# Patient Record
Sex: FEMALE | Race: BLACK OR AFRICAN AMERICAN | Employment: OTHER | URBAN - METROPOLITAN AREA
[De-identification: names, ages, dates, MRNs, and addresses within clinical notes are randomized per-mention and may not be internally consistent; named-entity substitution may affect disease eponyms.]

---

## 2017-01-27 RX ORDER — INSULIN GLARGINE 100 [IU]/ML
INJECTION, SOLUTION SUBCUTANEOUS
Qty: 15 ML | Refills: 4 | Status: SHIPPED | OUTPATIENT
Start: 2017-01-27 | End: 2017-05-09 | Stop reason: SDUPTHER

## 2017-04-12 ENCOUNTER — OP HISTORICAL/CONVERTED ENCOUNTER (OUTPATIENT)
Dept: OTHER | Age: 62
End: 2017-04-12

## 2017-04-18 ENCOUNTER — OP HISTORICAL/CONVERTED ENCOUNTER (OUTPATIENT)
Dept: OTHER | Age: 62
End: 2017-04-18

## 2017-05-09 ENCOUNTER — OFFICE VISIT (OUTPATIENT)
Dept: ENDOCRINOLOGY | Age: 62
End: 2017-05-09

## 2017-05-09 VITALS
RESPIRATION RATE: 20 BRPM | HEIGHT: 66 IN | WEIGHT: 155 LBS | OXYGEN SATURATION: 99 % | TEMPERATURE: 97.5 F | SYSTOLIC BLOOD PRESSURE: 143 MMHG | HEART RATE: 77 BPM | DIASTOLIC BLOOD PRESSURE: 73 MMHG | BODY MASS INDEX: 24.91 KG/M2

## 2017-05-09 DIAGNOSIS — E11.3593 PROLIFERATIVE DIABETIC RETINOPATHY OF BOTH EYES ASSOCIATED WITH TYPE 2 DIABETES MELLITUS, UNSPECIFIED PROLIFERATIVE RETINOPATHY TYPE (HCC): ICD-10-CM

## 2017-05-09 DIAGNOSIS — E16.2 HYPOGLYCEMIA: ICD-10-CM

## 2017-05-09 LAB
GLUCOSE POC: 146 MG/DL
HBA1C MFR BLD HPLC: 5.3 %

## 2017-05-09 RX ORDER — ACETAMINOPHEN 500 MG
TABLET ORAL
COMMUNITY

## 2017-05-09 RX ORDER — LANCETS 28 GAUGE
EACH MISCELLANEOUS
COMMUNITY
Start: 2017-02-04 | End: 2022-07-28

## 2017-05-09 RX ORDER — CLONIDINE 0.2 MG/24H
PATCH, EXTENDED RELEASE TRANSDERMAL
Refills: 1 | COMMUNITY
Start: 2017-04-17 | End: 2019-06-04 | Stop reason: ALTCHOICE

## 2017-05-09 RX ORDER — SODIUM BICARBONATE 650 MG/1
1 TABLET ORAL 3 TIMES DAILY
Refills: 2 | COMMUNITY
Start: 2017-02-21 | End: 2019-06-04 | Stop reason: ALTCHOICE

## 2017-05-09 NOTE — MR AVS SNAPSHOT
Visit Information Date & Time Provider Department Dept. Phone Encounter #  
 5/9/2017  1:45 PM Sean Her MD [facility] [phone] [id number] Follow-up Instructions Return in about 4 months (around 9/9/2017). Upcoming Health Maintenance Date Due Hepatitis C Screening 1955 FOOT EXAM Q1 2/23/1964 Pneumococcal 19-64 Medium Risk (1 of 1 - PPSV23) 2/23/1973 DTaP/Tdap/Td series (1 - Tdap) 2/23/1975 PAP AKA CERVICAL CYTOLOGY 2/23/1975 BREAST CANCER SCRN MAMMOGRAM 2/23/2004 FOBT Q 1 YEAR AGE 50-75 2/23/2004 ZOSTER VACCINE AGE 60> 2/23/2014 EYE EXAM RETINAL OR DILATED Q1 10/20/2015 HEMOGLOBIN A1C Q6M 3/9/2017 INFLUENZA AGE 9 TO ADULT 8/1/2017 MICROALBUMIN Q1 9/9/2017 LIPID PANEL Q1 9/9/2017 Allergies as of 5/9/2017  Review Complete On: 5/9/2017 By: Sean Her MD  
 No Known Allergies Current Immunizations  Never Reviewed No immunizations on file. Not reviewed this visit You Were Diagnosed With   
  
 Codes Comments Uncontrolled type 2 diabetes mellitus with diabetic nephropathy, with long-term current use of insulin ([facility]    -  Primary ICD-10-CM: E11.21, E11.65, Z79.4 ICD-9-CM: 250.42, 583.81, V58.67 Vitals BP Pulse Temp Resp Height(growth percentile) Weight(growth percentile) 143/73 77 97.5 °F (36.4 °C) (Oral) 20 5' 6\" (1.676 m) 155 lb (70.3 kg) SpO2 BMI OB Status Smoking Status 99% 25.02 kg/m2 Postmenopausal Never Smoker BMI and BSA Data Body Mass Index Body Surface Area 25.02 kg/m 2 1.81 m 2 Preferred Pharmacy Pharmacy Name Phone [address] [facility] [phone] Your Updated Medication List  
  
   
This list is accurate as of: 5/9/17  2:18 PM.  Always use your most recent med list. amLODIPine 10 mg tablet Commonly known as:  Nisreen Zarate Take 10 mg by mouth daily. aspirin 81 mg chewable tablet Take 81 mg by mouth daily. CENTRUM COMPLETE PO Take  by mouth.  
  
 cloNIDine 0.2 mg/24 hr patch Commonly known as:  CATAPRES  
APPLY 1 PATCH TRANSDERMALLY ONE TIME PER WEEK COREG 25 mg tablet Generic drug:  carvedilol Take 25 mg by mouth two (2) times daily (with meals). CRESTOR 40 mg tablet Generic drug:  rosuvastatin Take 40 mg by mouth nightly. docusate sodium 100 mg capsule Commonly known as:  Major Person Take 100 mg by mouth as needed for Constipation. EPOGEN INJECTION  
by Injection route every seven (7) days. famotidine 20 mg tablet Commonly known as:  PEPCID Take 20 mg by mouth as needed. ferrous sulfate 325 mg (65 mg iron) tablet Take  by mouth as needed. furosemide 40 mg tablet Commonly known as:  LASIX  
  
 glucose blood VI test strips strip Commonly known as:  FREESTYLE LITE STRIPS Test 4 times daily. Dx code E11.65  
  
 hydrALAZINE 100 mg tablet Commonly known as:  APRESOLINE Take  by mouth. insulin glargine 100 unit/mL (3 mL) pen Commonly known as:  LANTUS SOLOSTAR Inject 15 units at bedtime. STOP HUMALOG Insulin Needles (Disposable) 32 gauge x 5/32\" Ndle Commonly known as:  Rupali Pen Needle Use 4 times daily. Dx code 250.00 Insulin Needles (Disposable) 32 gauge x 5/32\" Ndle Commonly known as:  Rupali Pen Needle Use once daily. Dx code E11.65  
  
 insulin syringe-needle U-100 1/2 mL 31 gauge x 15/64\" Syrg Commonly known as:  BD INSULIN SYRINGE ULTRA-FINE  
1 Each by Does Not Apply route daily. Dx Code 250.00  
  
 isosorbide mononitrate ER 60 mg CR tablet Commonly known as:  IMDUR Take  by mouth every morning. * Lancets Misc Commonly known as:  FREESTYLE LANCETS Test 4 times daily. Dx code E11.65 * FREESTYLE LANCETS 28 gauge Misc Generic drug:  lancets PLAVIX 75 mg Tab Generic drug:  clopidogrel Take  by mouth daily. repaglinide 0.5 mg tablet Commonly known as:  Nik Aguayoccoli Take 1 Tab by mouth Before breakfast, lunch, and dinner. sodium bicarbonate 650 mg tablet Take 1 Tab by mouth three (3) times daily. TYLENOL EXTRA STRENGTH 500 mg tablet Generic drug:  acetaminophen Take  by mouth every six (6) hours as needed for Pain. * Notice: This list has 2 medication(s) that are the same as other medications prescribed for you. Read the directions carefully, and ask your doctor or other care provider to review them with you. We Performed the Following AMB POC GLUCOSE, QUANTITATIVE, BLOOD [47878 CPT(R)] AMB POC HEMOGLOBIN A1C [47636 CPT(R)] Follow-up Instructions Return in about 4 months (around 9/9/2017). Patient Instructions Fasting anywhere between 80 to 130 mg Rest of the day  In between 80 to 180 mg Check blood sugars immediately before each meal and at bedtime Decrease  Lantus  insulin to 10   units  at bed time (pens ) Take prandin 0.5 mg  before breakfast,  before lunch and  before dinner. Less than 70 mg NO INSULIN 
 
 
 
Do not skip meals Do not eat in between meals Reduce carbs- pasta, rice, potatoes, bread Do not drink juices or sodas Donot eat peanut butter , biscuits Do not eat sugar free cookies and cakes Do not eat peaches, grapes , oranges and pineapples 
 
 
--------------------------------------------------------------------------------------------------- Introducing [facility]! Kathie Hermosillo introduces [employer] patient portal. Now you can access parts of your medical record, email your doctor's office, and request medication refills online. 1. In your internet browser, go to [email or web address] 2. Click on the First Time User? Click Here link in the Sign In box. You will see the New Member Sign Up page. 3. Enter your [employer] Access Code exactly as it appears below.  You will not need to use this code after youve completed the sign-up process. If you do not sign up before the expiration date, you must request a new code. · [employer] Access Code: [email or web address] Expires: 8/7/2017  2:18 PM 
 
4. Enter the last four digits of your Social Security Number (xxxx) and Date of Birth (mm/dd/yyyy) as indicated and click Submit. You will be taken to the next sign-up page. 5. Create a [employer] ID. This will be your [employer] login ID and cannot be changed, so think of one that is secure and easy to remember. 6. Create a [employer] password. You can change your password at any time. 7. Enter your Password Reset Question and Answer. This can be used at a later time if you forget your password. 8. Enter your e-mail address. You will receive e-mail notification when new information is available in [address]. 9. Click Sign Up. You can now view and download portions of your medical record. 10. Click the Download Summary menu link to download a portable copy of your medical information. If you have questions, please visit the Frequently Asked Questions section of the [employer] website. Remember, [employer] is NOT to be used for urgent needs. For medical emergencies, dial 911. Now available from your iPhone and Android! Please provide this summary of care documentation to your next provider. Your primary care clinician is listed as Falguni Zimmer. If you have any questions after today's visit, please call [phone].

## 2017-05-09 NOTE — PATIENT INSTRUCTIONS
Fasting anywhere between 80 to 130 mg   Rest of the day  In between 80 to 180 mg               Check blood sugars immediately before each meal and at bedtime      Decrease  Lantus  insulin to 10   units  at bed time (pens )      Take prandin 0.5 mg  before breakfast,  before lunch and  before dinner.        Less than 70 mg NO INSULIN        Do not skip meals  Do not eat in between meals    Reduce carbs- pasta, rice, potatoes, bread   Do not drink juices or sodas  Donot eat peanut butter , biscuits     Do not eat sugar free cookies and cakes   Do not eat peaches, grapes , oranges and pineapples      ---------------------------------------------------------------------------------------------------

## 2017-05-09 NOTE — PROGRESS NOTES
Wt Readings from Last 3 Encounters:   05/09/17 155 lb (70.3 kg)   09/16/16 156 lb (70.8 kg)   08/05/16 160 lb (72.6 kg)     Temp Readings from Last 3 Encounters:   05/09/17 97.5 °F (36.4 °C) (Oral)   09/16/16 97 °F (36.1 °C) (Oral)   08/05/16 96.5 °F (35.8 °C) (Oral)     BP Readings from Last 3 Encounters:   05/09/17 143/73   09/16/16 159/77   08/05/16 182/85     Pulse Readings from Last 3 Encounters:   05/09/17 77   09/16/16 73   08/05/16 71     Lab Results   Component Value Date/Time    Hemoglobin A1c 5.7 09/09/2016 10:14 AM    Hemoglobin A1c (POC) 5.7 08/20/2015 12:16 PM    Hemoglobin A1c, External 6.6 05/06/2016     No Podiatry  Last Eye exam 2017

## 2017-05-09 NOTE — PROGRESS NOTES
HISTORY OF PRESENT ILLNESS  Sid Grant is a 60 y.o. female. HPI  Patient here for  F/u after last visit of Type 2 diabetes mellitus  From Sept 2016      Here to get A1c done for dental work  Accompanied by her    No h/o hospitalizations gladly      She is getting a good help from her    She says today unlike previous visits   She has fasting low sugars     She has only ONE check a day     She claims compliance with  meds   Diabetes, retinopathy and nephropathy    Social History     Social History    Marital status:      Spouse name: N/A    Number of children: N/A    Years of education: N/A     Occupational History    Not on file.      Social History Main Topics    Smoking status: Never Smoker    Smokeless tobacco: Not on file    Alcohol use No    Drug use: Not on file    Sexual activity: Not on file     Other Topics Concern    Not on file     Social History Narrative       Family History   Problem Relation Age of Onset    Diabetes Maternal Aunt     Diabetes Maternal Grandmother              Old history   Referred : Temo cummings     H/o diabetes for 10  years     Current A1C is 9 %  and symptoms/problems include fatigue     Current diabetic medications include Lantus 45 at night , tradjenta 5 mg ( non compliant )    Current monitoring regimen: home blood tests - daily  Home blood sugar records: trend: fluctuating a lot  Any episodes of hypoglycemia? no    Weight trend: fluctuating a bit  Prior visit with dietician: no  Current diet: \"unhealthy\" diet in general  Current exercise: no regular exercise    Known diabetic complications: retinopathy, nephropathy, peripheral neuropathy and cardiovascular disease  Cardiovascular risk factors: dyslipidemia, diabetes mellitus, hypertension, stress    Eye exam current (within one year): yes  TANIYA: yes     Past Medical History:   Diagnosis Date    CKD (chronic kidney disease)     HTN (hypertension)     Retinopathy     Type II or unspecified type diabetes mellitus with renal manifestations, uncontrolled      No past surgical history on file. Current Outpatient Prescriptions   Medication Sig    sodium bicarbonate 650 mg tablet Take 1 Tab by mouth three (3) times daily.  cloNIDine (CATAPRES) 0.2 mg/24 hr patch APPLY 1 PATCH TRANSDERMALLY ONE TIME PER WEEK    FREESTYLE LANCETS 28 gauge misc     MULTIVITAMIN/IRON/FOLIC ACID (CENTRUM COMPLETE PO) Take  by mouth.  acetaminophen (TYLENOL EXTRA STRENGTH) 500 mg tablet Take  by mouth every six (6) hours as needed for Pain.  insulin glargine (LANTUS SOLOSTAR) 100 unit/mL (3 mL) pen Inject 15 units at bedtime. STOP HUMALOG    furosemide (LASIX) 40 mg tablet     hydrALAZINE (APRESOLINE) 100 mg tablet Take  by mouth.  glucose blood VI test strips (FREESTYLE LITE STRIPS) strip Test 4 times daily. Dx code E11.65    Lancets (FREESTYLE LANCETS) misc Test 4 times daily. Dx code E11.65    EPOETIN PARAG (EPOGEN INJECTION) by Injection route every seven (7) days.  Insulin Needles, Disposable, (DARLING PEN NEEDLE) 32 gauge x 5/32\" ndle Use once daily. Dx code E11.65    repaglinide (PRANDIN) 0.5 mg tablet Take 1 Tab by mouth Before breakfast, lunch, and dinner.  amLODIPine (NORVASC) 10 mg tablet Take 10 mg by mouth daily.  isosorbide mononitrate ER (IMDUR) 60 mg CR tablet Take  by mouth every morning.  insulin syringe-needle U-100 (BD INSULIN SYRINGE ULTRA-FINE) 1/2 mL 31 x 15/64\" syrg 1 Each by Does Not Apply route daily. Dx Code 250.00    rosuvastatin (CRESTOR) 40 mg tablet Take 40 mg by mouth nightly.  Insulin Needles, Disposable, (DARLING PEN NEEDLE) 32 x 5/32 \" ndle Use 4 times daily. Dx code 250.00    docusate sodium (COLACE) 100 mg capsule Take 100 mg by mouth as needed for Constipation.  carvedilol (COREG) 25 mg tablet Take 25 mg by mouth two (2) times daily (with meals).  clopidogrel (PLAVIX) 75 mg tablet Take  by mouth daily.     ferrous sulfate 325 mg (65 mg iron) tablet Take  by mouth as needed.  famotidine (PEPCID) 20 mg tablet Take 20 mg by mouth as needed.  aspirin 81 mg chewable tablet Take 81 mg by mouth daily. No current facility-administered medications for this visit. Review of Systems   Constitutional: Positive for malaise/fatigue   HENT: Negative. Eyes:  legally blind . Respiratory: Positive for cough. Cardiovascular: Negative for chest pain, palpitations and leg swelling. Gastrointestinal: Negative. Negative for constipation. Genitourinary: Negative. Musculoskeletal: Negative for myalgias. Skin: Negative. Neurological: negative  Endo/Heme/Allergies: Negative. Psychiatric/Behavioral: Negative for depression and memory loss. The patient does not have insomnia. Physical Exam   Constitutional: She is oriented to person, place, and time. She appears well-developed and well-nourished. HENT:   Head: Normocephalic. Eyes: Legally blind   Neck: Normal range of motion. Neck supple. No JVD present. No tracheal deviation present. No thyromegaly present. Cardiovascular: Normal rate, regular rhythm and normal heart sounds. No murmur heard. Pulmonary/Chest: Breath sounds normal.   Abdominal: Soft. Bowel sounds are normal.   Musculoskeletal: Normal range of motion. Lymphadenopathy:     She has no cervical adenopathy. Neurological: She is alert and oriented to person, place, and time. She has normal reflexes. Skin: Skin is warm. Psychiatric: She has a normal mood and affect. Reviewed the labs from pcp, and  Dr. Benjamin and PLAN    1.  Type 2 DM uncontrolled : A1c is   5.3 %       From   Today      May 2017    comapred to    8.3 %  From today aug 2016 comapred to   6.2 %      From   Sept 2015 compared to    5.6 %   From Aug 2015 compared to   7 %   From May 2015 compared to over 9 % compared to over 12% from pcp labs     She has several diabetic complications retinopathy and nephropathy    paul is  Supervising  Her , but he works part time   Pt says she herself  administers lantus some times    As she has low sugars, decreased insulin to 10 units     There is no longer a nurse who comes home  She is blind almost and takes insulin , Using pen finally   ? ? prandin 0.5 mg 3 times a day before each meal , asked her to seprate the bottle by rubber band to recognize it     Patient is advised to check blood sugars 2-3  times daily by rotation method. reviewed medications and side effects in detail  lab results and schedule of future lab studies reviewed with patient      2. Hypoglycemia : At high risk because of blindness, being on insulin, dependant on others, and inability to check more often    Educated on treating the hypoglycemia and again admonished to take care of herself       3. HTN : better controlled with improved compliance since the long discussion with her    continue coreg 25 mg bid, lisinopril 20 mg a day,  norvasc  5 mg 2 pills a day,   and imdur 60 mg  A day  . 4. Dyslipidemia : continue crestor 40 . Patient is educated about benefits and adverse effects of statins and explained how benefits outweigh risk. 5. use of aspirin to prevent MI and TIA's discussed        6. Diabetic complications :     A. Retinopathy-YES has laser on left eye Legally blind   She draws insulin herself out of vial   regular f/u with ophthalmologist encouraged     B. Nephropathy - YES   ,stage 4 ( labs from sept first week )  Proteinuria   educated on this disease and indicated stages and its prognosis. Regular care with nephrologist encouraged  Forced her to see the  Nephrologist, whom she saw Dr. Josy Bauman. Peripheral Neuropathy - YES  , mild  educated on this disease and indicated improvement with good and stable glycemic control    D.  PAD : YES     order TANIYA /PVR    E : CAD : YES, f/u with Dr. Winda Galeazzi  Had CABG, and has PCM    She is educated about fluid restriction, not to worry about fine print on meds which say , causes weight gain     > 50 % of time is spent on counseling done         F/u in 2 months

## 2017-05-10 RX ORDER — REPAGLINIDE 0.5 MG/1
0.5 TABLET ORAL
Qty: 270 TAB | Refills: 4 | Status: SHIPPED | OUTPATIENT
Start: 2017-05-10 | End: 2018-03-13 | Stop reason: DRUGHIGH

## 2017-05-10 RX ORDER — INSULIN GLARGINE 100 [IU]/ML
INJECTION, SOLUTION SUBCUTANEOUS
Qty: 15 ML | Refills: 4 | Status: SHIPPED | OUTPATIENT
Start: 2017-05-10 | End: 2017-09-12

## 2017-06-02 ENCOUNTER — IP HISTORICAL/CONVERTED ENCOUNTER (OUTPATIENT)
Dept: OTHER | Age: 62
End: 2017-06-02

## 2017-06-15 ENCOUNTER — ED HISTORICAL/CONVERTED ENCOUNTER (OUTPATIENT)
Dept: OTHER | Age: 62
End: 2017-06-15

## 2017-09-05 ENCOUNTER — TELEPHONE (OUTPATIENT)
Dept: ENDOCRINOLOGY | Age: 62
End: 2017-09-05

## 2017-09-05 DIAGNOSIS — Z79.4 TYPE 2 DIABETES MELLITUS WITH HYPERGLYCEMIA, WITH LONG-TERM CURRENT USE OF INSULIN (HCC): Primary | ICD-10-CM

## 2017-09-05 DIAGNOSIS — E11.65 TYPE 2 DIABETES MELLITUS WITH HYPERGLYCEMIA, WITH LONG-TERM CURRENT USE OF INSULIN (HCC): Primary | ICD-10-CM

## 2017-09-06 ENCOUNTER — HOSPITAL ENCOUNTER (OUTPATIENT)
Dept: LAB | Age: 62
Discharge: HOME | End: 2017-09-06
Payer: MEDICARE

## 2017-09-06 ENCOUNTER — TELEPHONE (OUTPATIENT)
Dept: ENDOCRINOLOGY | Age: 62
End: 2017-09-06

## 2017-09-06 DIAGNOSIS — E11.65 TYPE 2 DIABETES MELLITUS WITH HYPERGLYCEMIA, WITH LONG-TERM CURRENT USE OF INSULIN (HCC): Primary | ICD-10-CM

## 2017-09-06 DIAGNOSIS — Z79.4 TYPE 2 DIABETES MELLITUS WITH HYPERGLYCEMIA, WITH LONG-TERM CURRENT USE OF INSULIN (HCC): Primary | ICD-10-CM

## 2017-09-06 PROCEDURE — 82043 UR ALBUMIN QUANTITATIVE: CPT

## 2017-09-08 LAB
ALBUMIN/CREAT UR: 283.2 MG/G CREAT (ref 0–30)
CREAT UR-MCNC: 161.1 MG/DL
Lab: NORMAL
MICROALBUMIN UR-MCNC: 456.2 UG/ML

## 2017-09-12 ENCOUNTER — OFFICE VISIT (OUTPATIENT)
Dept: ENDOCRINOLOGY | Age: 62
End: 2017-09-12

## 2017-09-12 VITALS
TEMPERATURE: 97.6 F | WEIGHT: 159 LBS | HEART RATE: 71 BPM | DIASTOLIC BLOOD PRESSURE: 64 MMHG | HEIGHT: 66 IN | SYSTOLIC BLOOD PRESSURE: 140 MMHG | BODY MASS INDEX: 25.55 KG/M2 | RESPIRATION RATE: 12 BRPM

## 2017-09-12 DIAGNOSIS — Z99.2 TYPE 2 DIABETES MELLITUS WITH CHRONIC KIDNEY DISEASE ON CHRONIC DIALYSIS, WITH LONG-TERM CURRENT USE OF INSULIN (HCC): Primary | ICD-10-CM

## 2017-09-12 DIAGNOSIS — Z79.4 TYPE 2 DIABETES MELLITUS WITH CHRONIC KIDNEY DISEASE ON CHRONIC DIALYSIS, WITH LONG-TERM CURRENT USE OF INSULIN (HCC): Primary | ICD-10-CM

## 2017-09-12 DIAGNOSIS — I10 ESSENTIAL HYPERTENSION: ICD-10-CM

## 2017-09-12 DIAGNOSIS — N18.6 TYPE 2 DIABETES MELLITUS WITH CHRONIC KIDNEY DISEASE ON CHRONIC DIALYSIS, WITH LONG-TERM CURRENT USE OF INSULIN (HCC): Primary | ICD-10-CM

## 2017-09-12 DIAGNOSIS — E16.2 HYPOGLYCEMIA: ICD-10-CM

## 2017-09-12 DIAGNOSIS — E11.22 TYPE 2 DIABETES MELLITUS WITH CHRONIC KIDNEY DISEASE ON CHRONIC DIALYSIS, WITH LONG-TERM CURRENT USE OF INSULIN (HCC): Primary | ICD-10-CM

## 2017-09-12 RX ORDER — PANTOPRAZOLE SODIUM 40 MG/1
TABLET, DELAYED RELEASE ORAL
COMMUNITY
Start: 2017-07-20

## 2017-09-12 NOTE — PROGRESS NOTES
HISTORY OF PRESENT ILLNESS  Massimo Gamboa is a 60 y.o. female. HPI  Patient here for  F/u after last visit of Type 2 diabetes mellitus  From May 9   2017      In the interim, she got started on HD   She is getting a good help from her        She has fasting low sugars     She has only ONE check a day     She claims compliance with  meds       Social History     Social History    Marital status:      Spouse name: N/A    Number of children: N/A    Years of education: N/A     Occupational History    Not on file. Social History Main Topics    Smoking status: Never Smoker    Smokeless tobacco: Never Used    Alcohol use No    Drug use: Not on file    Sexual activity: Not on file     Other Topics Concern    Not on file     Social History Narrative       Family History   Problem Relation Age of Onset    Diabetes Maternal Aunt     Diabetes Maternal Grandmother              Old history   Referred : James cummings     H/o diabetes for 10  years     Current A1C is 9 %  and symptoms/problems include fatigue     Current diabetic medications include Lantus 45 at night , tradjenta 5 mg ( non compliant )    Current monitoring regimen: home blood tests - daily  Home blood sugar records: trend: fluctuating a lot  Any episodes of hypoglycemia? no    Weight trend: fluctuating a bit  Prior visit with dietician: no  Current diet: \"unhealthy\" diet in general  Current exercise: no regular exercise    Known diabetic complications: retinopathy, nephropathy, peripheral neuropathy and cardiovascular disease  Cardiovascular risk factors: dyslipidemia, diabetes mellitus, hypertension, stress    Eye exam current (within one year): yes  TANIYA: yes     Past Medical History:   Diagnosis Date    CKD (chronic kidney disease)     HTN (hypertension)     Retinopathy     Type II or unspecified type diabetes mellitus with renal manifestations, uncontrolled      No past surgical history on file.   Current Outpatient Prescriptions   Medication Sig    pantoprazole (PROTONIX) 40 mg tablet     insulin glargine (LANTUS SOLOSTAR) 100 unit/mL (3 mL) pen Decrease to 10 units at bedtime. STOP HUMALOG    repaglinide (PRANDIN) 0.5 mg tablet Take 1 Tab by mouth Before breakfast, lunch, and dinner.  sodium bicarbonate 650 mg tablet Take 1 Tab by mouth three (3) times daily.  cloNIDine (CATAPRES) 0.2 mg/24 hr patch APPLY 1 PATCH TRANSDERMALLY ONE TIME PER WEEK    FREESTYLE LANCETS 28 gauge misc     MULTIVITAMIN/IRON/FOLIC ACID (CENTRUM COMPLETE PO) Take  by mouth.  acetaminophen (TYLENOL EXTRA STRENGTH) 500 mg tablet Take  by mouth every six (6) hours as needed for Pain.  furosemide (LASIX) 40 mg tablet     hydrALAZINE (APRESOLINE) 100 mg tablet Take  by mouth.  EPOETIN PARAG (EPOGEN INJECTION) by Injection route every seven (7) days.  amLODIPine (NORVASC) 10 mg tablet Take 10 mg by mouth daily.  isosorbide mononitrate ER (IMDUR) 60 mg CR tablet Take  by mouth every morning.  rosuvastatin (CRESTOR) 40 mg tablet Take 40 mg by mouth nightly.  docusate sodium (COLACE) 100 mg capsule Take 100 mg by mouth as needed for Constipation.  carvedilol (COREG) 25 mg tablet Take 25 mg by mouth two (2) times daily (with meals).  clopidogrel (PLAVIX) 75 mg tablet Take  by mouth daily.  ferrous sulfate 325 mg (65 mg iron) tablet Take  by mouth as needed.  aspirin 81 mg chewable tablet Take 81 mg by mouth daily.  glucose blood VI test strips (FREESTYLE LITE STRIPS) strip Test 4 times daily. Dx code E11.65    Lancets (FREESTYLE LANCETS) misc Test 4 times daily. Dx code E11.65    Insulin Needles, Disposable, (DARLING PEN NEEDLE) 32 gauge x 5/32\" ndle Use once daily. Dx code E11.65    insulin syringe-needle U-100 (BD INSULIN SYRINGE ULTRA-FINE) 1/2 mL 31 x 15/64\" syrg 1 Each by Does Not Apply route daily. Dx Code 250.00    Insulin Needles, Disposable, (DARLING PEN NEEDLE) 32 x 5/32 \" ndle Use 4 times daily.  Dx code 250.00    famotidine (PEPCID) 20 mg tablet Take 20 mg by mouth as needed. No current facility-administered medications for this visit. Review of Systems   Constitutional: Positive for malaise/fatigue   HENT: Negative. Eyes:  legally blind . Respiratory:    Cardiovascular: Negative for chest pain, palpitations and leg swelling. Gastrointestinal: Negative. Negative for constipation. Genitourinary: Negative. Musculoskeletal: Negative for myalgias. Skin: Negative. Neurological: negative  Endo/Heme/Allergies: Negative. Psychiatric/Behavioral: Negative for depression and memory loss. The patient does not have insomnia. Physical Exam   Constitutional: She is oriented to person, place, and time. She appears well-developed and well-nourished. HENT:   Head: Normocephalic. Eyes: Legally blind   Neck: Normal range of motion. Neck supple. No JVD present. No tracheal deviation present. No thyromegaly present. Cardiovascular: Normal rate, regular rhythm and normal heart sounds. No murmur heard. Pulmonary/Chest: Breath sounds normal.   Abdominal: Soft. Bowel sounds are normal.   Musculoskeletal: Normal range of motion. Right upper arm has dialysis catheter  Lymphadenopathy:     She has no cervical adenopathy. Neurological: She is alert and oriented to person, place, and time. She has normal reflexes. Skin: Skin is warm. Psychiatric: She has a normal mood and affect. Reviewed the labs from pcp, and  Dr. Ivy Wong and PLAN    1.  Type 2 DM uncontrolled  On hemodialysis : A1c is   5.3 %       From   Today      May 2017    comapred to    8.3 %  From today aug 2016 comapred to   6.2 %      From   Sept 2015 compared to    5.6 %   From Aug 2015 compared to   7 %   From May 2015 compared to over 9 % compared to over 12% from pcp labs     She has several diabetic complications retinopathy and nephropathy    gladly is  Supervising  Her , but he works part time Pt says she herself  administers lantus some times    As she has low sugars, on 10 units   As a trial, since on HD, will STOP LANTUS     Kept prandin 0.5 mg as optional     Patient is advised to check blood sugars 2-3  times daily by rotation method. reviewed medications and side effects in detail  lab results and schedule of future lab studies reviewed with patient      2. Hypoglycemia : At high risk because of blindness, being on insulin, dependant on others, and inability to check more often    Educated on treating the hypoglycemia and again admonished to take care of herself       3. HTN : better controlled with improved compliance since the long discussion with her    continue coreg 25 mg bid, lisinopril 20 mg a day,  norvasc  5 mg 2 pills a day,   and imdur 60 mg  A day  . 4. Dyslipidemia : continue crestor 40 . Patient is educated about benefits and adverse effects of statins and explained how benefits outweigh risk. 5. use of aspirin to prevent MI and TIA's discussed        6. Diabetic complications :     A. Retinopathy-YES has laser on left eye Legally blind   She draws insulin herself out of vial   regular f/u with ophthalmologist encouraged     B. Nephropathy - YES   On hemodialysis   educated on this disease and indicated stages and its prognosis. Regular care with nephrologist encouraged  F/u  With  Dr. Harpreet Chao. Peripheral Neuropathy - YES  , mild  educated on this disease and indicated improvement with good and stable glycemic control    D.  PAD : YES     order TANIYA /PVR    E : CAD : YES, f/u with Dr. Tom Hernandez  Had CABG, and has PCM    She is educated about fluid restriction, not to worry about fine print on meds which say , causes weight gain     > 50 % of time is spent on counseling done         F/u in 6 months

## 2017-09-12 NOTE — MR AVS SNAPSHOT
Visit Information Date & Time Provider Department Dept. Phone Encounter #  
 9/12/2017 10:30 AM Jefferson Paz MD [facility] [phone] [id number] Follow-up Instructions Return in about 6 months (around 3/12/2018). Upcoming Health Maintenance Date Due Hepatitis C Screening 1955 FOOT EXAM Q1 2/23/1964 Pneumococcal 19-64 Medium Risk (1 of 1 - PPSV23) 2/23/1973 DTaP/Tdap/Td series (1 - Tdap) 2/23/1975 PAP AKA CERVICAL CYTOLOGY 2/23/1975 BREAST CANCER SCRN MAMMOGRAM 2/23/2004 FOBT Q 1 YEAR AGE 50-75 2/23/2004 ZOSTER VACCINE AGE 60> 12/23/2013 EYE EXAM RETINAL OR DILATED Q1 10/20/2015 INFLUENZA AGE 9 TO ADULT 8/1/2017 HEMOGLOBIN A1C Q6M 3/5/2018 LIPID PANEL Q1 9/5/2018 MICROALBUMIN Q1 9/6/2018 Allergies as of 9/12/2017  Review Complete On: 9/12/2017 By: Jefferson Paz MD  
 No Known Allergies Current Immunizations  Never Reviewed No immunizations on file. Not reviewed this visit You Were Diagnosed With   
  
 Codes Comments Type 2 diabetes mellitus with chronic kidney disease on chronic dialysis, with long-term current use of insulin (HCC)    -  Primary ICD-10-CM: E11.22, N18.6, Z99.2, Z79.4 ICD-9-CM: 250.40, 585.9, V45.11, V58.67 Vitals BP Pulse Temp Resp Height(growth percentile) Weight(growth percentile) 140/64 (BP 1 Location: Left arm, BP Patient Position: Sitting) 71 97.6 °F (36.4 °C) (Oral) 12 5' 6\" (1.676 m) 159 lb (72.1 kg) BMI OB Status Smoking Status 25.66 kg/m2 Postmenopausal Never Smoker BMI and BSA Data Body Mass Index Body Surface Area  
 25.66 kg/m 2 1.83 m 2 Preferred Pharmacy Pharmacy Name Phone [address] [facility] [phone] Your Updated Medication List  
  
   
This list is accurate as of: 9/12/17 11:31 AM.  Always use your most recent med list.  
  
  
  
  
 amLODIPine 10 mg tablet Commonly known as:  Enma Ape Take 10 mg by mouth daily. aspirin 81 mg chewable tablet Take 81 mg by mouth daily. CENTRUM COMPLETE PO Take  by mouth.  
  
 cloNIDine 0.2 mg/24 hr patch Commonly known as:  CATAPRES  
APPLY 1 PATCH TRANSDERMALLY ONE TIME PER WEEK COREG 25 mg tablet Generic drug:  carvedilol Take 25 mg by mouth two (2) times daily (with meals). CRESTOR 40 mg tablet Generic drug:  rosuvastatin Take 40 mg by mouth nightly. docusate sodium 100 mg capsule Commonly known as:  Eilldevon Schneiders Take 100 mg by mouth as needed for Constipation. EPOGEN INJECTION  
by Injection route every seven (7) days. famotidine 20 mg tablet Commonly known as:  PEPCID Take 20 mg by mouth as needed. ferrous sulfate 325 mg (65 mg iron) tablet Take  by mouth as needed. furosemide 40 mg tablet Commonly known as:  LASIX  
  
 glucose blood VI test strips strip Commonly known as:  FREESTYLE LITE STRIPS Test 4 times daily. Dx code E11.65  
  
 hydrALAZINE 100 mg tablet Commonly known as:  APRESOLINE Take  by mouth. Insulin Needles (Disposable) 32 gauge x 5/32\" Ndle Commonly known as:  Rupali Pen Needle Use 4 times daily. Dx code 250.00 Insulin Needles (Disposable) 32 gauge x 5/32\" Ndle Commonly known as:  Rupali Pen Needle Use once daily. Dx code E11.65  
  
 insulin syringe-needle U-100 1/2 mL 31 gauge x 15/64\" Syrg Commonly known as:  BD INSULIN SYRINGE ULTRA-FINE  
1 Each by Does Not Apply route daily. Dx Code 250.00  
  
 isosorbide mononitrate ER 60 mg CR tablet Commonly known as:  IMDUR Take  by mouth every morning. * Lancets Misc Commonly known as:  FREESTYLE LANCETS Test 4 times daily. Dx code E11.65 * FREESTYLE LANCETS 28 gauge Misc Generic drug:  lancets  
  
 pantoprazole 40 mg tablet Commonly known as:  PROTONIX  
  
 PLAVIX 75 mg Tab Generic drug:  clopidogrel Take  by mouth daily. repaglinide 0.5 mg tablet Commonly known as:  Van Contes Take 1 Tab by mouth Before breakfast, lunch, and dinner. sodium bicarbonate 650 mg tablet Take 1 Tab by mouth three (3) times daily. TYLENOL EXTRA STRENGTH 500 mg tablet Generic drug:  acetaminophen Take  by mouth every six (6) hours as needed for Pain. * Notice: This list has 2 medication(s) that are the same as other medications prescribed for you. Read the directions carefully, and ask your doctor or other care provider to review them with you. Follow-up Instructions Return in about 6 months (around 3/12/2018). Patient Instructions Fasting anywhere between 80 to 130 mg Rest of the day  In between 80 to 180 mg Check blood sugars immediately before each meal and at bedtime STOP  Lantus  insulin to 8   units  After dialysis (Optional )  prandin 0.5 mg  before breakfast,  before lunch and  before dinner. Less than 70 mg NO INSULIN 
 
 
 
Do not skip meals Do not eat in between meals Reduce carbs- pasta, rice, potatoes, bread Do not drink juices or sodas Donot eat peanut butter , biscuits Do not eat sugar free cookies and cakes Do not eat peaches, grapes , oranges and pineapples 
 
 
--------------------------------------------------------------------------------------------------- Introducing [facility]! [employer] introduces BriefCam patient portal. Now you can access parts of your medical record, email your doctor's office, and request medication refills online. 1. In your internet browser, go to [email or web address] 2. Click on the First Time User? Click Here link in the Sign In box. You will see the New Member Sign Up page. 3. Enter your BriefCam Access Code exactly as it appears below. You will not need to use this code after youve completed the sign-up process.  If you do not sign up before the expiration date, you must request a new code. · [employer] Access Code: [email or web address] Expires: 12/11/2017 11:31 AM 
 
4. Enter the last four digits of your Social Security Number (xxxx) and Date of Birth (mm/dd/yyyy) as indicated and click Submit. You will be taken to the next sign-up page. 5. Create a [employer] ID. This will be your [employer] login ID and cannot be changed, so think of one that is secure and easy to remember. 6. Create a [employer] password. You can change your password at any time. 7. Enter your Password Reset Question and Answer. This can be used at a later time if you forget your password. 8. Enter your e-mail address. You will receive e-mail notification when new information is available in [address] 9. Click Sign Up. You can now view and download portions of your medical record. 10. Click the Download Summary menu link to download a portable copy of your medical information. If you have questions, please visit the Frequently Asked Questions section of the [employer] website. Remember, [employer] is NOT to be used for urgent needs. For medical emergencies, dial 911. Now available from your iPhone and Android! Please provide this summary of care documentation to your next provider. Your primary care clinician is listed as [employer]. If you have any questions after today's visit, please call [phone].

## 2017-09-12 NOTE — PROGRESS NOTES
Wt Readings from Last 3 Encounters:   09/12/17 159 lb (72.1 kg)   05/09/17 155 lb (70.3 kg)   09/16/16 156 lb (70.8 kg)     Temp Readings from Last 3 Encounters:   09/12/17 97.6 °F (36.4 °C) (Oral)   05/09/17 97.5 °F (36.4 °C) (Oral)   09/16/16 97 °F (36.1 °C) (Oral)     BP Readings from Last 3 Encounters:   09/12/17 140/64   05/09/17 143/73   09/16/16 159/77     Pulse Readings from Last 3 Encounters:   09/12/17 71   05/09/17 77   09/16/16 73     Lab Results   Component Value Date/Time    Hemoglobin A1c 5.7 09/05/2017 04:46 PM    Hemoglobin A1c (POC) 5.3 05/09/2017 01:54 PM    Hemoglobin A1c, External 6.6 05/06/2016

## 2017-09-12 NOTE — PATIENT INSTRUCTIONS
Fasting anywhere between 80 to 130 mg   Rest of the day  In between 80 to 180 mg         Check blood sugars immediately before each meal and at bedtime      STOP  Lantus  insulin to 8   units  After dialysis       (Optional )  prandin 0.5 mg  before breakfast,  before lunch and  before dinner.        Less than 70 mg NO INSULIN        Do not skip meals  Do not eat in between meals    Reduce carbs- pasta, rice, potatoes, bread   Do not drink juices or sodas  Donot eat peanut butter , biscuits     Do not eat sugar free cookies and cakes   Do not eat peaches, grapes , oranges and pineapples      ---------------------------------------------------------------------------------------------------

## 2017-10-19 ENCOUNTER — OP HISTORICAL/CONVERTED ENCOUNTER (OUTPATIENT)
Dept: OTHER | Age: 62
End: 2017-10-19

## 2017-11-14 RX ORDER — BLOOD-GLUCOSE METER
KIT MISCELLANEOUS
Qty: 200 STRIP | Refills: 0 | Status: SHIPPED | OUTPATIENT
Start: 2017-11-14 | End: 2018-05-12 | Stop reason: SDUPTHER

## 2018-03-13 ENCOUNTER — OFFICE VISIT (OUTPATIENT)
Dept: ENDOCRINOLOGY | Age: 63
End: 2018-03-13

## 2018-03-13 VITALS
RESPIRATION RATE: 18 BRPM | WEIGHT: 167.9 LBS | DIASTOLIC BLOOD PRESSURE: 76 MMHG | BODY MASS INDEX: 26.98 KG/M2 | TEMPERATURE: 97.9 F | HEART RATE: 72 BPM | HEIGHT: 66 IN | OXYGEN SATURATION: 99 % | SYSTOLIC BLOOD PRESSURE: 176 MMHG

## 2018-03-13 DIAGNOSIS — E11.8 UNCONTROLLED TYPE 2 DIABETES MELLITUS WITH COMPLICATION, UNSPECIFIED LONG TERM INSULIN USE STATUS: Primary | ICD-10-CM

## 2018-03-13 DIAGNOSIS — Z99.2 ESRD ON HEMODIALYSIS (HCC): ICD-10-CM

## 2018-03-13 DIAGNOSIS — E11.65 UNCONTROLLED TYPE 2 DIABETES MELLITUS WITH COMPLICATION, UNSPECIFIED LONG TERM INSULIN USE STATUS: Primary | ICD-10-CM

## 2018-03-13 DIAGNOSIS — N18.6 ESRD ON HEMODIALYSIS (HCC): ICD-10-CM

## 2018-03-13 DIAGNOSIS — E11.65 TYPE 2 DIABETES MELLITUS WITH HYPERGLYCEMIA, UNSPECIFIED LONG TERM INSULIN USE STATUS: Primary | ICD-10-CM

## 2018-03-13 LAB
GLUCOSE POC: 383 MG/DL
HBA1C MFR BLD HPLC: 8.5 %

## 2018-03-13 RX ORDER — REPAGLINIDE 1 MG/1
1 TABLET ORAL
Qty: 270 TAB | Refills: 4 | Status: SHIPPED | OUTPATIENT
Start: 2018-03-13 | End: 2019-12-18 | Stop reason: ALTCHOICE

## 2018-03-13 NOTE — PROGRESS NOTES
1. Have you been to the ER, urgent care clinic since your last visit? Hospitalized since your last visit? No    2. Have you seen or consulted any other health care providers outside of the [address] [place] [address] since your last visit? Include any pap smears or colon screening.  No     Chief Complaint   Patient presents with    Diabetes     follow up     Wt Readings from Last 3 Encounters:   03/13/18 167 lb 14.4 oz (76.2 kg)   09/12/17 159 lb (72.1 kg)   05/09/17 155 lb (70.3 kg)     Temp Readings from Last 3 Encounters:   03/13/18 97.9 °F (36.6 °C) (Oral)   09/12/17 97.6 °F (36.4 °C) (Oral)   05/09/17 97.5 °F (36.4 °C) (Oral)     BP Readings from Last 3 Encounters:   03/13/18 176/76   09/12/17 140/64   05/09/17 143/73     Pulse Readings from Last 3 Encounters:   03/13/18 72   09/12/17 71   05/09/17 77     Lab Results   Component Value Date/Time    Glucose 99 09/05/2017 04:46 PM    Glucose  03/13/2018 10:08 AM

## 2018-03-13 NOTE — PROGRESS NOTES
HISTORY OF PRESENT ILLNESS  Melissa Pennington is a 58 y.o. female. HPI  Patient here for  F/u after last visit of Type 2 diabetes mellitus  From sept 12 2017    she is on HD   She is getting a good help from her        She has checked sugars only 4 times    She is not compliant with diet at all       Social History     Social History    Marital status:      Spouse name: N/A    Number of children: N/A    Years of education: N/A     Occupational History    Not on file. Social History Main Topics    Smoking status: Never Smoker    Smokeless tobacco: Never Used    Alcohol use No    Drug use: Not on file    Sexual activity: Not on file     Other Topics Concern    Not on file     Social History Narrative       Family History   Problem Relation Age of Onset    Diabetes Maternal Aunt     Diabetes Maternal Grandmother              Old history   Referred : Swapnil cummings     H/o diabetes for 10  years     Current A1C is 9 %  and symptoms/problems include fatigue     Current diabetic medications include Lantus 45 at night , tradjenta 5 mg ( non compliant )    Current monitoring regimen: home blood tests - daily  Home blood sugar records: trend: fluctuating a lot  Any episodes of hypoglycemia? no    Weight trend: fluctuating a bit  Prior visit with dietician: no  Current diet: \"unhealthy\" diet in general  Current exercise: no regular exercise    Known diabetic complications: retinopathy, nephropathy, peripheral neuropathy and cardiovascular disease  Cardiovascular risk factors: dyslipidemia, diabetes mellitus, hypertension, stress    Eye exam current (within one year): yes  TANIYA: yes     Past Medical History:   Diagnosis Date    CKD (chronic kidney disease)     HTN (hypertension)     Retinopathy     Type II or unspecified type diabetes mellitus with renal manifestations, uncontrolled(250.42) ([facility] [place] [address]      History reviewed. No pertinent surgical history.   Current Outpatient Prescriptions Medication Sig    FREESTYLE LITE STRIPS strip TEST FOUR TIMES DAILY    pantoprazole (PROTONIX) 40 mg tablet     repaglinide (PRANDIN) 0.5 mg tablet Take 1 Tab by mouth Before breakfast, lunch, and dinner.  sodium bicarbonate 650 mg tablet Take 1 Tab by mouth three (3) times daily.  FREESTYLE LANCETS 28 gauge misc     MULTIVITAMIN/IRON/FOLIC ACID (CENTRUM COMPLETE PO) Take  by mouth.  acetaminophen (TYLENOL EXTRA STRENGTH) 500 mg tablet Take  by mouth every six (6) hours as needed for Pain.  hydrALAZINE (APRESOLINE) 100 mg tablet Take  by mouth.  Lancets (FREESTYLE LANCETS) misc Test 4 times daily. Dx code E11.65    Insulin Needles, Disposable, (DARLING PEN NEEDLE) 32 gauge x 5/32\" ndle Use once daily. Dx code E11.65    amLODIPine (NORVASC) 10 mg tablet Take 10 mg by mouth daily.  isosorbide mononitrate ER (IMDUR) 60 mg CR tablet Take  by mouth every morning.  insulin syringe-needle U-100 (BD INSULIN SYRINGE ULTRA-FINE) 1/2 mL 31 x 15/64\" syrg 1 Each by Does Not Apply route daily. Dx Code 250.00    rosuvastatin (CRESTOR) 40 mg tablet Take 40 mg by mouth nightly.  Insulin Needles, Disposable, (DARLING PEN NEEDLE) 32 x 5/32 \" ndle Use 4 times daily. Dx code 250.00    docusate sodium (COLACE) 100 mg capsule Take 100 mg by mouth as needed for Constipation.  carvedilol (COREG) 25 mg tablet Take 25 mg by mouth two (2) times daily (with meals).  clopidogrel (PLAVIX) 75 mg tablet Take  by mouth daily.  ferrous sulfate 325 mg (65 mg iron) tablet Take  by mouth as needed.  famotidine (PEPCID) 20 mg tablet Take 20 mg by mouth as needed.  aspirin 81 mg chewable tablet Take 81 mg by mouth daily.  cloNIDine (CATAPRES) 0.2 mg/24 hr patch APPLY 1 PATCH TRANSDERMALLY ONE TIME PER WEEK    furosemide (LASIX) 40 mg tablet     EPOETIN PARAG (EPOGEN INJECTION) by Injection route every seven (7) days. No current facility-administered medications for this visit.         Review of Systems Constitutional: Positive for malaise/fatigue   HENT: Negative. Eyes:  legally blind . Respiratory:    Cardiovascular: Negative for chest pain, palpitations and leg swelling. Gastrointestinal: Negative. Negative for constipation. Genitourinary: Negative. Musculoskeletal: Negative for myalgias. Skin: Negative. Neurological: negative  Endo/Heme/Allergies: Negative. Psychiatric/Behavioral: Negative for depression and memory loss. The patient does not have insomnia. Physical Exam   Constitutional: She is oriented to person, place, and time. She appears well-developed and well-nourished. HENT:   Head: Normocephalic. Eyes: Legally blind   Neck: Normal range of motion. Neck supple. No JVD present. No tracheal deviation present. No thyromegaly present. Cardiovascular: Normal rate, regular rhythm and normal heart sounds. No murmur heard. Pulmonary/Chest: Breath sounds normal.   Abdominal: Soft. Bowel sounds are normal.   Musculoskeletal: Normal range of motion. Right upper arm has dialysis catheter  Lymphadenopathy:     She has no cervical adenopathy. Neurological: She is alert and oriented to person, place, and time. She has normal reflexes. Skin: Skin is warm. Psychiatric: She has a normal mood and affect.        Diabetic foot exam: March 2018    Left: Reflexes nd     Vibratory sensation normal    Proprioception nd   Sharp/dull discrimination nd    Filament test normal sensation with micro filament   Pulse DP: 2+ (normal)   Pulse PT: nd   Deformities: Nails are hypertrophic, discolored from chronic onychomycosis    Dorsal Skin is dry, flaky and plantar skin is calloused     Right: Reflexes nd   Vibratory sensation normal   Proprioception nd   Sharp/dull discrimination nd   Filament test normal sensation with micro filament   Pulse DP: 2+ (normal)   Pulse PT: nd   Deformities: Nails are hypertrophic, discolored from chronic onychomycosis    Dorsal Skin is dry, flaky and plantar skin is calloused           Reviewed the labs from pcp, and  Dr. Owen Rose and PLAN    1. Type 2 DM uncontrolled  On hemodialysis : A1c is  8.5  %     From    Today march 2018    Compared to  5.3 %       From   Today      May 2017    comapred to    8.3 %  From today aug 2016 comapred to   6.2 %      From   Sept 2015 compared to    5.6 %   From Aug 2015 compared to   7 %   From May 2015 compared to over 9 % compared to over 12% from pcp labs     She has several diabetic complications retinopathy and nephropathy    gladly is  Supervising  Her , but he works part time   since on HD, will STOPPED  LANTUS in set 2017     Increase prandin  At 1 mg right before meals   Patient is advised to check blood sugars 2-3  times daily by rotation method. reviewed medications and side effects in detail  lab results and schedule of future lab studies reviewed with patient      2. Hypoglycemia : At high risk because of blindness, being on insulin, dependant on others, and inability to check more often    Educated on treating the hypoglycemia and again admonished to take care of herself       3. HTN : better controlled with improved compliance since the long discussion with her    continue coreg 25 mg bid, lisinopril 20 mg a day,  norvasc  5 mg 2 pills a day,   and imdur 60 mg  A day  . 4. Dyslipidemia : continue crestor 40 . Patient is educated about benefits and adverse effects of statins and explained how benefits outweigh risk. 5. use of aspirin to prevent MI and TIA's discussed        6. Diabetic complications :     A. Retinopathy-YES has laser on left eye Legally blind   She draws insulin herself out of vial   regular f/u with ophthalmologist encouraged     B. Nephropathy - YES   On hemodialysis   educated on this disease and indicated stages and its prognosis. Regular care with nephrologist encouraged  F/u  With  Dr. Capri Johnson.  Peripheral Neuropathy - YES  , mild  educated on this disease and indicated improvement with good and stable glycemic control    D.  PAD : YES     order TANIYA /PVR    E : CAD : YES, f/u with Dr. Colette Washington  Had CABG, and has PCM    She is educated about fluid restriction, not to worry about fine print on meds which say , causes weight gain         > 50 % of time is spent on counseling done   Patient voiced understanding her plan of care       F/u in 6 months

## 2018-03-13 NOTE — MR AVS SNAPSHOT
[address] [facility] [address] 
[phone] Patient: Harjinder Carbone MRN: [id number] RXF:[id number] Visit Information Date & Time Provider Department Dept. Phone Encounter #  
 3/13/2018 10:30 AM Erin Wynne MD [facility] [phone] [id number] Follow-up Instructions Return in about 6 months (around 9/13/2018). Upcoming Health Maintenance Date Due Hepatitis C Screening 1955 FOOT EXAM Q1 2/23/1964 Pneumococcal 19-64 Medium Risk (1 of 1 - PPSV23) 2/23/1973 DTaP/Tdap/Td series (1 - Tdap) 2/23/1975 PAP AKA CERVICAL CYTOLOGY 2/23/1975 BREAST CANCER SCRN MAMMOGRAM 2/23/2004 FOBT Q 1 YEAR AGE 50-75 2/23/2004 ZOSTER VACCINE AGE 60> 12/23/2013 EYE EXAM RETINAL OR DILATED Q1 10/20/2015 Influenza Age 5 to Adult 8/1/2017 HEMOGLOBIN A1C Q6M 3/5/2018 LIPID PANEL Q1 9/5/2018 MICROALBUMIN Q1 9/6/2018 Allergies as of 3/13/2018  Review Complete On: 3/13/2018 By: Erin Wynne MD  
 No Known Allergies Current Immunizations  Never Reviewed No immunizations on file. Not reviewed this visit You Were Diagnosed With   
  
 Codes Comments Uncontrolled type 2 diabetes mellitus with complication, unspecified long term insulin use status ([facility]    -  Primary ICD-10-CM: E11.8, E11.65 ICD-9-CM: 250.92   
 ESRD on hemodialysis ([facility]     ICD-10-CM: N18.6, Z99.2 ICD-9-CM: 585.6, V45.11 Vitals BP Pulse Temp Resp Height(growth percentile) Weight(growth percentile) 176/76 (BP 1 Location: Left arm, BP Patient Position: Sitting) 72 97.9 °F (36.6 °C) (Oral) 18 5' 6\" (1.676 m) 167 lb 14.4 oz (76.2 kg) SpO2 BMI OB Status Smoking Status 99% 27.1 kg/m2 Postmenopausal Never Smoker BMI and BSA Data Body Mass Index Body Surface Area  
 27.1 kg/m 2 1.88 m 2 [phone] Your Updated Medication List  
  
   
This list is accurate as of 3/13/18 10:41 AM.  Always use your most recent med list.  
  
  
  
  
 aspirin 81 mg chewable tablet Take 81 mg by mouth daily. CENTRUM COMPLETE PO Take  by mouth.  
  
 cloNIDine 0.2 mg/24 hr patch Commonly known as:  CATAPRES  
APPLY 1 PATCH TRANSDERMALLY ONE TIME PER WEEK COREG 25 mg tablet Generic drug:  carvedilol Take 25 mg by mouth two (2) times daily (with meals). CRESTOR 40 mg tablet Generic drug:  rosuvastatin Take 40 mg by mouth nightly. docusate sodium 100 mg capsule Commonly known as:  Jasmina Palin Take 100 mg by mouth as needed for Constipation. EPOGEN INJECTION  
by Injection route every seven (7) days. famotidine 20 mg tablet Commonly known as:  PEPCID Take 20 mg by mouth as needed. ferrous sulfate 325 mg (65 mg iron) tablet Take  by mouth as needed. FREESTYLE LITE STRIPS strip Generic drug:  glucose blood VI test strips TEST FOUR TIMES DAILY  
  
 furosemide 40 mg tablet Commonly known as:  LASIX Insulin Needles (Disposable) 32 gauge x 5/32\" Ndle Commonly known as:  Rupali Pen Needle Use 4 times daily. Dx code 250.00 Insulin Needles (Disposable) 32 gauge x 5/32\" Ndle Commonly known as:  Rupali Pen Needle Use once daily. Dx code E11.65  
  
 insulin syringe-needle U-100 1/2 mL 31 gauge x 15/64\" Syrg Commonly known as:  BD INSULIN SYRINGE ULTRA-FINE  
1 Each by Does Not Apply route daily. Dx Code 250.00 * Lancets Misc Commonly known as:  FREESTYLE LANCETS Test 4 times daily. Dx code E11.65 * FREESTYLE LANCETS 28 gauge Misc Generic drug:  lancets  
  
 pantoprazole 40 mg tablet Commonly known as:  PROTONIX  
  
 repaglinide 1 mg tablet Commonly known as:  Mitali Ibrahim  
 Take 1 Tab by mouth Before breakfast, lunch, and dinner. Stop 0.5 mg dose  
  
 sodium bicarbonate 650 mg tablet Take 1 Tab by mouth three (3) times daily. TYLENOL EXTRA STRENGTH 500 mg tablet Generic drug:  acetaminophen Take  by mouth every six (6) hours as needed for Pain. * Notice: This list has 2 medication(s) that are the same as other medications prescribed for you. Read the directions carefully, and ask your doctor or other care provider to review them with you. Prescriptions Sent to Pharmacy Refills  
 repaglinide (PRANDIN) 1 mg tablet 4 Sig: Take 1 Tab by mouth Before breakfast, lunch, and dinner. Stop 0.5 mg dose Class: Normal  
 Pharmacy: [address], [address] [place] [address] #: [phone] Route: Oral  
  
We Performed the Following AMB POC GLUCOSE BLOOD, BY GLUCOSE MONITORING DEVICE [79550 CPT(R)] AMB POC HEMOGLOBIN A1C [73051 CPT(R)]  DIABETES FOOT EXAM [HM7 Custom] Follow-up Instructions Return in about 6 months (around 9/13/2018). Patient Instructions Fasting anywhere between 80 to 130 mg Rest of the day  In between 80 to 180 mg Check blood sugars immediately before each meal and at bedtime Resume   Lantus  Insulin  8   units , but take the shot  After dialysis Increase  prandin to 1  mg  before breakfast,  before lunch and  before dinner. Less than 70 mg NO INSULIN 
 
 
 
Do not skip meals Do not eat in between meals Reduce carbs- pasta, rice, potatoes, bread Do not drink juices or sodas Donot eat peanut butter , biscuits Do not eat sugar free cookies and cakes Do not eat peaches, grapes , oranges and pineapples 
 
 
--------------------------------------------------------------------------------------------------- Introducing [facility] & HEALTH SERVICES!    
 Radha Banks introduces [employer] patient portal. Now you can access parts of your medical record, email your doctor's office, and request medication refills online. 1. In your internet browser, go to [email or web address] 2. Click on the First Time User? Click Here link in the Sign In box. You will see the New Member Sign Up page. 3. Enter your [facility] Access Code exactly as it appears below. You will not need to use this code after youve completed the sign-up process. If you do not sign up before the expiration date, you must request a new code. · [facility] Access Code: [email or web address] Expires: 6/11/2018 10:40 AM 
 
4. Enter the last four digits of your Social Security Number (xxxx) and Date of Birth (mm/dd/yyyy) as indicated and click Submit. You will be taken to the next sign-up page. 5. Create a [facility] ID. This will be your [facility] login ID and cannot be changed, so think of one that is secure and easy to remember. 6. Create a [facility] password. You can change your password at any time. 7. Enter your Password Reset Question and Answer. This can be used at a later time if you forget your password. 8. Enter your e-mail address. You will receive e-mail notification when new information is available in [address] 9. Click Sign Up. You can now view and download portions of your medical record. 10. Click the Download Summary menu link to download a portable copy of your medical information. If you have questions, please visit the Frequently Asked Questions section of the [employer] website. Remember, [facility] is NOT to be used for urgent needs. For medical emergencies, dial 911. Now available from your iPhone and Android! Please provide this summary of care documentation to your next provider. Your primary care clinician is listed as Soumya De La Rosa. If you have any questions after today's visit, please call [phone].

## 2018-03-13 NOTE — PATIENT INSTRUCTIONS
Fasting anywhere between 80 to 130 mg   Rest of the day  In between 80 to 180 mg         Check blood sugars immediately before each meal and at bedtime      Resume   Lantus  Insulin  8   units , but take the shot  After dialysis       Increase  prandin to 1  mg  before breakfast,  before lunch and  before dinner.        Less than 70 mg NO INSULIN        Do not skip meals  Do not eat in between meals    Reduce carbs- pasta, rice, potatoes, bread   Do not drink juices or sodas  Donot eat peanut butter , biscuits     Do not eat sugar free cookies and cakes   Do not eat peaches, grapes , oranges and pineapples      ---------------------------------------------------------------------------------------------------

## 2018-03-19 RX ORDER — INSULIN GLARGINE 100 [IU]/ML
INJECTION, SOLUTION SUBCUTANEOUS
Qty: 15 ML | Refills: 3 | Status: SHIPPED | OUTPATIENT
Start: 2018-03-19 | End: 2019-12-18 | Stop reason: SDUPTHER

## 2018-05-13 RX ORDER — BLOOD-GLUCOSE METER
KIT MISCELLANEOUS
Qty: 200 STRIP | Refills: 0 | Status: SHIPPED | OUTPATIENT
Start: 2018-05-13 | End: 2018-07-09 | Stop reason: SDUPTHER

## 2018-05-30 ENCOUNTER — TELEPHONE (OUTPATIENT)
Dept: ENDOCRINOLOGY | Age: 63
End: 2018-05-30

## 2018-05-31 RX ORDER — PEN NEEDLE, DIABETIC 31 GX3/16"
NEEDLE, DISPOSABLE MISCELLANEOUS
Qty: 50 PEN NEEDLE | Refills: 6 | Status: SHIPPED | OUTPATIENT
Start: 2018-05-31 | End: 2018-09-13 | Stop reason: SDUPTHER

## 2018-07-09 RX ORDER — BLOOD-GLUCOSE METER
KIT MISCELLANEOUS
Qty: 200 STRIP | Refills: 0 | Status: SHIPPED | OUTPATIENT
Start: 2018-07-09 | End: 2022-07-28

## 2018-08-08 ENCOUNTER — OP HISTORICAL/CONVERTED ENCOUNTER (OUTPATIENT)
Dept: OTHER | Age: 63
End: 2018-08-08

## 2018-08-13 ENCOUNTER — OP HISTORICAL/CONVERTED ENCOUNTER (OUTPATIENT)
Dept: OTHER | Age: 63
End: 2018-08-13

## 2018-09-06 ENCOUNTER — HOSPITAL ENCOUNTER (OUTPATIENT)
Dept: LAB | Age: 63
Discharge: HOME | End: 2018-09-06
Payer: MEDICARE

## 2018-09-06 PROCEDURE — 83036 HEMOGLOBIN GLYCOSYLATED A1C: CPT

## 2018-09-06 PROCEDURE — 82043 UR ALBUMIN QUANTITATIVE: CPT

## 2018-09-06 PROCEDURE — 80053 COMPREHEN METABOLIC PANEL: CPT

## 2018-09-06 PROCEDURE — 80061 LIPID PANEL: CPT

## 2018-09-06 PROCEDURE — 36415 COLL VENOUS BLD VENIPUNCTURE: CPT

## 2018-09-13 ENCOUNTER — OFFICE VISIT (OUTPATIENT)
Dept: ENDOCRINOLOGY | Age: 63
End: 2018-09-13

## 2018-09-13 VITALS
DIASTOLIC BLOOD PRESSURE: 47 MMHG | BODY MASS INDEX: 28.33 KG/M2 | WEIGHT: 176.3 LBS | HEART RATE: 80 BPM | TEMPERATURE: 97.9 F | HEIGHT: 66 IN | RESPIRATION RATE: 18 BRPM | OXYGEN SATURATION: 100 % | SYSTOLIC BLOOD PRESSURE: 84 MMHG

## 2018-09-13 DIAGNOSIS — E11.22 TYPE 2 DIABETES MELLITUS WITH CHRONIC KIDNEY DISEASE ON CHRONIC DIALYSIS, WITH LONG-TERM CURRENT USE OF INSULIN (HCC): Primary | ICD-10-CM

## 2018-09-13 DIAGNOSIS — Z79.4 TYPE 2 DIABETES MELLITUS WITH CHRONIC KIDNEY DISEASE ON CHRONIC DIALYSIS, WITH LONG-TERM CURRENT USE OF INSULIN (HCC): Primary | ICD-10-CM

## 2018-09-13 DIAGNOSIS — N18.6 TYPE 2 DIABETES MELLITUS WITH CHRONIC KIDNEY DISEASE ON CHRONIC DIALYSIS, WITH LONG-TERM CURRENT USE OF INSULIN (HCC): Primary | ICD-10-CM

## 2018-09-13 DIAGNOSIS — Z99.2 TYPE 2 DIABETES MELLITUS WITH CHRONIC KIDNEY DISEASE ON CHRONIC DIALYSIS, WITH LONG-TERM CURRENT USE OF INSULIN (HCC): Primary | ICD-10-CM

## 2018-09-13 DIAGNOSIS — E78.2 MIXED HYPERLIPIDEMIA: ICD-10-CM

## 2018-09-13 DIAGNOSIS — H54.8 LEGALLY BLIND: ICD-10-CM

## 2018-09-13 DIAGNOSIS — I10 ESSENTIAL HYPERTENSION: Chronic | ICD-10-CM

## 2018-09-13 RX ORDER — ROSUVASTATIN CALCIUM 20 MG/1
20 TABLET, COATED ORAL
Qty: 90 TAB | Refills: 4 | Status: SHIPPED | OUTPATIENT
Start: 2018-09-13 | End: 2019-06-04 | Stop reason: SDUPTHER

## 2018-09-13 RX ORDER — LOSARTAN POTASSIUM 25 MG/1
TABLET ORAL DAILY
COMMUNITY
End: 2018-09-13 | Stop reason: SDUPTHER

## 2018-09-13 RX ORDER — AMLODIPINE BESYLATE 5 MG/1
5 TABLET ORAL DAILY
COMMUNITY
End: 2020-08-05

## 2018-09-13 RX ORDER — LOSARTAN POTASSIUM 25 MG/1
25 TABLET ORAL DAILY
Qty: 90 TAB | Refills: 4 | Status: SHIPPED | OUTPATIENT
Start: 2018-09-13 | End: 2019-10-22 | Stop reason: SDUPTHER

## 2018-09-13 RX ORDER — SEVELAMER CARBONATE 800 MG/1
TABLET, FILM COATED ORAL 3 TIMES DAILY
COMMUNITY

## 2018-09-13 RX ORDER — PEN NEEDLE, DIABETIC 31 GX3/16"
NEEDLE, DISPOSABLE MISCELLANEOUS
Qty: 50 PEN NEEDLE | Refills: 6 | Status: SHIPPED | OUTPATIENT
Start: 2018-09-13 | End: 2019-06-04 | Stop reason: SDUPTHER

## 2018-09-13 NOTE — MR AVS SNAPSHOT
[address] [facility] [address] 
[phone] Patient: Sudeep Adorno MRN: [id number] JOSE:[birth year] Visit Information Date & Time Provider Department Dept. Phone Encounter #  
 9/13/2018 10:45 AM Fritz Schuler MD [facility] Endocrinology [phone] [id number] Follow-up Instructions Return in about 6 months (around 3/13/2019). Upcoming Health Maintenance Date Due Hepatitis C Screening 1955 Pneumococcal 19-64 Highest Risk (1 of 3 - PCV13) 2/23/1973 DTaP/Tdap/Td series (1 - Tdap) 2/23/1975 PAP AKA CERVICAL CYTOLOGY 2/23/1975 BREAST CANCER SCRN MAMMOGRAM 2/23/2004 FOBT Q 1 YEAR AGE 50-75 2/23/2004 ZOSTER VACCINE AGE 60> 12/23/2013 EYE EXAM RETINAL OR DILATED Q1 10/20/2015 MEDICARE YEARLY EXAM 3/14/2018 Influenza Age 5 to Adult 8/1/2018 HEMOGLOBIN A1C Q6M 3/6/2019 FOOT EXAM Q1 3/13/2019 MICROALBUMIN Q1 9/6/2019 LIPID PANEL Q1 9/6/2019 Allergies as of 9/13/2018  Review Complete On: 9/13/2018 By: Fritz Schuler MD  
 No Known Allergies Current Immunizations  Never Reviewed No immunizations on file. Not reviewed this visit You Were Diagnosed With   
  
 Codes Comments Type 2 diabetes mellitus with chronic kidney disease on chronic dialysis, with long-term current use of insulin (HCC)    -  Primary ICD-10-CM: E11.22, N18.6, Z99.2, Z79.4 ICD-9-CM: 250.40, 585.9, V45.11, V58.67 Legally blind     ICD-10-CM: H54.8 ICD-9-CM: 369.4 Mixed hyperlipidemia     ICD-10-CM: E78.2 ICD-9-CM: 272.2 Vitals BP Pulse Temp Resp Height(growth percentile) Weight(growth percentile) (!) 84/47 (BP 1 Location: Left arm, BP Patient Position: Sitting) 80 97.9 °F (36.6 °C) (Oral) 18 5' 6\" (1.676 m) 176 lb 4.8 oz (80 kg) SpO2 BMI OB Status Smoking Status 100% 28.46 kg/m2 Postmenopausal Never Smoker Vitals History BMI and BSA Data Body Mass Index Body Surface Area  
 28.46 kg/m 2 1.93 m 2 [facility] [address] [facility] [phone] Your Updated Medication List  
  
   
This list is accurate as of 9/13/18 11:37 AM.  Always use your most recent med list. amLODIPine 5 mg tablet Commonly known as:  Dedrick Alstrom Take 5 mg by mouth daily. aspirin 81 mg chewable tablet Take 81 mg by mouth daily. CENTRUM COMPLETE PO Take  by mouth.  
  
 cloNIDine 0.2 mg/24 hr patch Commonly known as:  CATAPRES  
APPLY 1 PATCH TRANSDERMALLY ONE TIME PER WEEK COREG 25 mg tablet Generic drug:  carvedilol Take 25 mg by mouth two (2) times daily (with meals). CRESTOR 40 mg tablet Generic drug:  rosuvastatin Take 40 mg by mouth nightly. docusate sodium 100 mg capsule Commonly known as:  Tessa Antis Take 100 mg by mouth as needed for Constipation. EPOGEN INJECTION  
by Injection route every seven (7) days. famotidine 20 mg tablet Commonly known as:  PEPCID Take 20 mg by mouth as needed. ferrous sulfate 325 mg (65 mg iron) tablet Take  by mouth as needed. FREESTYLE LITE STRIPS strip Generic drug:  glucose blood VI test strips TEST FOUR TIMES DAILY  
  
 furosemide 40 mg tablet Commonly known as:  LASIX * insulin glargine 300 unit/mL (1.5 mL) Inpn Commonly known as:  TOUJEO SOLOSTAR U-300 INSULIN Inject 8 units in the evening. * insulin glargine 100 unit/mL (3 mL) Inpn Commonly known as:  LANTUS SOLOSTAR U-100 INSULIN Inject 8 units daily. Insulin Needles (Disposable) 32 gauge x 5/32\" Ndle Commonly known as:  Rupali Pen Needle Use once daily. Dx code E11.65 * Lancets Misc Commonly known as:  FREESTYLE LANCETS Test 4 times daily. Dx code E11.65 * FREESTYLE LANCETS 28 gauge Misc Generic drug:  lancets losartan 25 mg tablet Commonly known as:  COZAAR Take  by mouth daily. pantoprazole 40 mg tablet Commonly known as:  PROTONIX  
  
 repaglinide 1 mg tablet Commonly known as:  Jud Kocher Take 1 Tab by mouth Before breakfast, lunch, and dinner. Stop 0.5 mg dose  
  
 sevelamer carbonate 800 mg Tab tab Commonly known as:  Patrick Hightower Take  by mouth three (3) times daily. sodium bicarbonate 650 mg tablet Take 1 Tab by mouth three (3) times daily. TYLENOL EXTRA STRENGTH 500 mg tablet Generic drug:  acetaminophen Take  by mouth every six (6) hours as needed for Pain. * Notice: This list has 4 medication(s) that are the same as other medications prescribed for you. Read the directions carefully, and ask your doctor or other care provider to review them with you. Prescriptions Sent to Pharmacy Refills Insulin Needles, Disposable, (DARLING PEN NEEDLE) 32 gauge x 5/32\" ndle 6 Sig: Use once daily. Dx code E11.65 Class: Normal  
 Pharmacy: [facility] [place] [address] #: [phone] Follow-up Instructions Return in about 6 months (around 3/13/2019). Patient Instructions   
-------------------------------------------------------------------------------------------- Refills    -    please call your pharmacy and have them send us a refill request 
 
Results  -  allow up to a week for lab results to be processed and reviewed. Phone calls  -  Allow upto 24 hrs. for non-urgent calls to be retained Prior authorization - It may take up to 2 weeks to process, depending on your insurance Forms  -  FMLA, DMV, patient assistance, etc. will take up to 2 weeks to process Cancellations - please notify the office in advance if you cannot keep your appointment Samples  - will only be dispensed at visits as supply is limited If you are having a medical emergency call 911 
 
-------------------------------------------------------------------------------------------- Fasting anywhere between 80 to 130 mg Rest of the day  In between 80 to 180 mg Decrease  crestor 20 mg at night Check blood sugars immediately before each meal and at bedtime Lantus  Insulin  8   units , but take the shot  After dialysis  
 
 
  prandin to 1  mg  before breakfast,  before lunch and  before dinner. Less than 70 mg NO INSULIN 
 
 
 
Do not skip meals Do not eat in between meals Reduce carbs- pasta, rice, potatoes, bread Do not drink juices or sodas Donot eat peanut butter , biscuits Do not eat sugar free cookies and cakes Do not eat peaches, grapes , oranges and pineapples 
 
 
--------------------------------------------------------------------------------------------------- Introducing [facility] & HEALTH SERVICES! [facility] introduces [employer] patient portal. Now you can access parts of your medical record, email your doctor's office, and request medication refills online. 1. In your internet browser, go to [email or web address] 2. Click on the First Time User? Click Here link in the Sign In box. You will see the New Member Sign Up page. 3. Enter your [employer] Access Code exactly as it appears below. You will not need to use this code after youve completed the sign-up process. If you do not sign up before the expiration date, you must request a new code. · [employer] Access Code: [email or web address] Expires: 12/12/2018 11:37 AM 
 
4. Enter the last four digits of your Social Security Number (xxxx) and Date of Birth (mm/dd/yyyy) as indicated and click Submit. You will be taken to the next sign-up page. 5. Create a [employer] ID. This will be your [employer] login ID and cannot be changed, so think of one that is secure and easy to remember. 6. Create a [employer] password. You can change your password at any time. 7. Enter your Password Reset Question and Answer. This can be used at a later time if you forget your password. 8. Enter your e-mail address. You will receive e-mail notification when new information is available in [address] 9. Click Sign Up. You can now view and download portions of your medical record. 10. Click the Download Summary menu link to download a portable copy of your medical information. If you have questions, please visit the Frequently Asked Questions section of the [employer] website. Remember, [employer] is NOT to be used for urgent needs. For medical emergencies, dial 911. Now available from your iPhone and Android! Please provide this summary of care documentation to your next provider. Your primary care clinician is listed as Nilda Jansen. If you have any questions after today's visit, please call [phone].

## 2018-09-13 NOTE — PATIENT INSTRUCTIONS
-------------------------------------------------------------------------------------------- Refills    -    please call your pharmacy and have them send us a refill request 
 
Results  -  allow up to a week for lab results to be processed and reviewed. Phone calls  -  Allow upto 24 hrs. for non-urgent calls to be retained Prior authorization - It may take up to 2 weeks to process, depending on your insurance Forms  -  FMLA, DMV, patient assistance, etc. will take up to 2 weeks to process Cancellations - please notify the office in advance if you cannot keep your appointment Samples  - will only be dispensed at visits as supply is limited If you are having a medical emergency call 911 
 
-------------------------------------------------------------------------------------------- Fasting anywhere between 80 to 130 mg Rest of the day  In between 80 to 180 mg Decrease  crestor 20 mg at night Check blood sugars immediately before each meal and at bedtime Lantus  Insulin  8   units , but take the shot  After dialysis  
 
 
  prandin to 1  mg  before breakfast,  before lunch and  before dinner. Less than 70 mg NO INSULIN 
 
 
 
Do not skip meals Do not eat in between meals Reduce carbs- pasta, rice, potatoes, bread Do not drink juices or sodas Donot eat peanut butter , biscuits Do not eat sugar free cookies and cakes Do not eat peaches, grapes , oranges and pineapples 
 
 
---------------------------------------------------------------------------------------------------
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
## 2019-05-24 ENCOUNTER — TELEPHONE (OUTPATIENT)
Dept: ENDOCRINOLOGY | Age: 64
End: 2019-05-24

## 2019-05-24 DIAGNOSIS — Z99.2 TYPE 2 DIABETES MELLITUS WITH CHRONIC KIDNEY DISEASE ON CHRONIC DIALYSIS, WITH LONG-TERM CURRENT USE OF INSULIN (HCC): Primary | ICD-10-CM

## 2019-05-24 DIAGNOSIS — N18.6 TYPE 2 DIABETES MELLITUS WITH CHRONIC KIDNEY DISEASE ON CHRONIC DIALYSIS, WITH LONG-TERM CURRENT USE OF INSULIN (HCC): Primary | ICD-10-CM

## 2019-05-24 DIAGNOSIS — Z79.4 TYPE 2 DIABETES MELLITUS WITH CHRONIC KIDNEY DISEASE ON CHRONIC DIALYSIS, WITH LONG-TERM CURRENT USE OF INSULIN (HCC): Primary | ICD-10-CM

## 2019-05-24 DIAGNOSIS — E11.22 TYPE 2 DIABETES MELLITUS WITH CHRONIC KIDNEY DISEASE ON CHRONIC DIALYSIS, WITH LONG-TERM CURRENT USE OF INSULIN (HCC): Primary | ICD-10-CM

## 2019-05-28 ENCOUNTER — HOSPITAL ENCOUNTER (OUTPATIENT)
Dept: LAB | Age: 64
Discharge: HOME | End: 2019-05-28
Payer: MEDICARE

## 2019-05-28 DIAGNOSIS — E11.22 TYPE 2 DIABETES MELLITUS WITH CHRONIC KIDNEY DISEASE ON CHRONIC DIALYSIS, WITH LONG-TERM CURRENT USE OF INSULIN (HCC): ICD-10-CM

## 2019-05-28 DIAGNOSIS — Z99.2 TYPE 2 DIABETES MELLITUS WITH CHRONIC KIDNEY DISEASE ON CHRONIC DIALYSIS, WITH LONG-TERM CURRENT USE OF INSULIN (HCC): ICD-10-CM

## 2019-05-28 DIAGNOSIS — Z79.4 TYPE 2 DIABETES MELLITUS WITH CHRONIC KIDNEY DISEASE ON CHRONIC DIALYSIS, WITH LONG-TERM CURRENT USE OF INSULIN (HCC): ICD-10-CM

## 2019-05-28 DIAGNOSIS — N18.6 TYPE 2 DIABETES MELLITUS WITH CHRONIC KIDNEY DISEASE ON CHRONIC DIALYSIS, WITH LONG-TERM CURRENT USE OF INSULIN (HCC): ICD-10-CM

## 2019-05-28 PROCEDURE — 80053 COMPREHEN METABOLIC PANEL: CPT

## 2019-05-28 PROCEDURE — 36415 COLL VENOUS BLD VENIPUNCTURE: CPT

## 2019-05-28 PROCEDURE — 80061 LIPID PANEL: CPT

## 2019-05-28 PROCEDURE — 83036 HEMOGLOBIN GLYCOSYLATED A1C: CPT

## 2019-05-29 ENCOUNTER — HOSPITAL ENCOUNTER (OUTPATIENT)
Dept: LAB | Age: 64
Discharge: HOME | End: 2019-05-29
Payer: MEDICARE

## 2019-05-29 PROCEDURE — 82043 UR ALBUMIN QUANTITATIVE: CPT

## 2019-05-30 LAB
ALBUMIN SERPL-MCNC: 4 G/DL (ref 3.6–4.8)
ALBUMIN/CREAT UR: 336.1 MG/G CREAT (ref 0–30)
ALBUMIN/GLOB SERPL: 1.4 {RATIO} (ref 1.2–2.2)
ALP SERPL-CCNC: 97 IU/L (ref 39–117)
ALT SERPL-CCNC: 15 IU/L (ref 0–32)
AST SERPL-CCNC: 15 IU/L (ref 0–40)
BILIRUB SERPL-MCNC: 0.2 MG/DL (ref 0–1.2)
BUN SERPL-MCNC: 47 MG/DL (ref 8–27)
BUN/CREAT SERPL: 7 (ref 12–28)
CALCIUM SERPL-MCNC: 9.3 MG/DL (ref 8.7–10.3)
CHLORIDE SERPL-SCNC: 101 MMOL/L (ref 96–106)
CHOLEST SERPL-MCNC: 93 MG/DL (ref 100–199)
CO2 SERPL-SCNC: 24 MMOL/L (ref 20–29)
CREAT SERPL-MCNC: 7.1 MG/DL (ref 0.57–1)
CREAT UR-MCNC: 149.7 MG/DL
EST. AVERAGE GLUCOSE BLD GHB EST-MCNC: 140 MG/DL
GLOBULIN SER CALC-MCNC: 2.8 G/DL (ref 1.5–4.5)
GLUCOSE SERPL-MCNC: 143 MG/DL (ref 65–99)
HBA1C MFR BLD: 6.5 % (ref 4.8–5.6)
HDLC SERPL-MCNC: 30 MG/DL
INTERPRETATION, 910389: NORMAL
INTERPRETATION: NORMAL
LDLC SERPL CALC-MCNC: 28 MG/DL (ref 0–99)
Lab: NORMAL
Lab: NORMAL
MICROALBUMIN UR-MCNC: 503.2 UG/ML
PDF IMAGE, 910387: NORMAL
POTASSIUM SERPL-SCNC: 5 MMOL/L (ref 3.5–5.2)
PROT SERPL-MCNC: 6.8 G/DL (ref 6–8.5)
SODIUM SERPL-SCNC: 144 MMOL/L (ref 134–144)
SPECIMEN STATUS REPORT, ROLRST: NORMAL
TRIGL SERPL-MCNC: 177 MG/DL (ref 0–149)
VLDLC SERPL CALC-MCNC: 35 MG/DL (ref 5–40)

## 2019-06-04 ENCOUNTER — OFFICE VISIT (OUTPATIENT)
Dept: ENDOCRINOLOGY | Age: 64
End: 2019-06-04

## 2019-06-04 VITALS
HEART RATE: 73 BPM | WEIGHT: 173.9 LBS | BODY MASS INDEX: 27.95 KG/M2 | HEIGHT: 66 IN | RESPIRATION RATE: 18 BRPM | DIASTOLIC BLOOD PRESSURE: 55 MMHG | SYSTOLIC BLOOD PRESSURE: 123 MMHG | OXYGEN SATURATION: 98 % | TEMPERATURE: 96.4 F

## 2019-06-04 DIAGNOSIS — Z99.2 TYPE 2 DIABETES MELLITUS WITH CHRONIC KIDNEY DISEASE ON CHRONIC DIALYSIS, WITH LONG-TERM CURRENT USE OF INSULIN (HCC): Primary | ICD-10-CM

## 2019-06-04 DIAGNOSIS — H54.8 LEGALLY BLIND: ICD-10-CM

## 2019-06-04 DIAGNOSIS — I10 ESSENTIAL HYPERTENSION: ICD-10-CM

## 2019-06-04 DIAGNOSIS — E11.22 TYPE 2 DIABETES MELLITUS WITH CHRONIC KIDNEY DISEASE ON CHRONIC DIALYSIS, WITH LONG-TERM CURRENT USE OF INSULIN (HCC): Primary | ICD-10-CM

## 2019-06-04 DIAGNOSIS — E78.2 MIXED HYPERLIPIDEMIA: ICD-10-CM

## 2019-06-04 DIAGNOSIS — N18.6 TYPE 2 DIABETES MELLITUS WITH CHRONIC KIDNEY DISEASE ON CHRONIC DIALYSIS, WITH LONG-TERM CURRENT USE OF INSULIN (HCC): Primary | ICD-10-CM

## 2019-06-04 DIAGNOSIS — Z79.4 TYPE 2 DIABETES MELLITUS WITH CHRONIC KIDNEY DISEASE ON CHRONIC DIALYSIS, WITH LONG-TERM CURRENT USE OF INSULIN (HCC): Primary | ICD-10-CM

## 2019-06-04 RX ORDER — ROSUVASTATIN CALCIUM 20 MG/1
20 TABLET, COATED ORAL
Qty: 90 TAB | Refills: 4 | Status: SHIPPED | OUTPATIENT
Start: 2019-06-04 | End: 2020-08-05 | Stop reason: SDUPTHER

## 2019-06-04 RX ORDER — HYDRALAZINE HYDROCHLORIDE 25 MG/1
25 TABLET, FILM COATED ORAL 3 TIMES DAILY
Refills: 1 | COMMUNITY
Start: 2019-03-11

## 2019-06-04 RX ORDER — AMLODIPINE BESYLATE 10 MG/1
TABLET ORAL DAILY
COMMUNITY
End: 2020-08-05

## 2019-06-04 RX ORDER — ISOSORBIDE MONONITRATE 60 MG/1
TABLET, EXTENDED RELEASE ORAL
COMMUNITY
End: 2021-10-19

## 2019-06-04 RX ORDER — PEN NEEDLE, DIABETIC 31 GX3/16"
NEEDLE, DISPOSABLE MISCELLANEOUS
Qty: 50 PEN NEEDLE | Refills: 11 | Status: SHIPPED | OUTPATIENT
Start: 2019-06-04 | End: 2020-11-27 | Stop reason: SDUPTHER

## 2019-06-04 NOTE — LETTER
6/4/19 Patient: Mary Thorne YOB: 1955 Date of Visit: 6/4/2019 Marisol Haynes MD 
[address] [facility] [address] [facility] [address] VIA Facsimile: [phone] Dear Marisol Haynes MD, Thank you for referring Ms. Angelika Reaves to [id number] [place] [address] for evaluation. My notes for this consultation are attached. If you have questions, please do not hesitate to call me. I look forward to following your patient along with you. Sincerely, Juventino Gabriel MD
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
## 2019-06-04 NOTE — PATIENT INSTRUCTIONS
--------------------------------------------------------------------------------------------    Refills    -    please call your pharmacy and have them send us a refill request    Results  -  allow up to a week for lab results to be processed and reviewed. Phone calls  -  Allow upto 24 hrs. for non-urgent calls to be retained    Prior authorization - It may take up to 2 weeks to process, depending on your insurance    Forms  -  FMLA, DMV, patient assistance, etc. will take up to 2 weeks to process    Cancellations - please notify the office in advance if you cannot keep your appointment    Samples  - will only be dispensed at visits as supply is limited      If you are having a medical emergency call 911    --------------------------------------------------------------------------------------------    Fasting anywhere between 80 to 130 mg   Rest of the day  In between 80 to 180 mg         Decrease  crestor 20 mg at night           Check blood sugars immediately before each meal and at bedtime        Lantus  Insulin  8   units  In the morning         prandin to 1  mg  before breakfast,  before lunch and  before dinner.        Less than 70 mg NO INSULIN        Do not skip meals  Do not eat in between meals    Reduce carbs- pasta, rice, potatoes, bread   Do not drink juices or sodas  Donot eat peanut butter , biscuits     Do not eat sugar free cookies and cakes   Do not eat peaches, grapes , oranges and pineapples      ---------------------------------------------------------------------------------------------------

## 2019-06-04 NOTE — PROGRESS NOTES
1. Have you been to the ER, urgent care clinic since your last visit? Hospitalized since your last visit? No    2. Have you seen or consulted any other health care providers outside of the [address] [place] [address] since your last visit? Include any pap smears or colon screening. No     Chief Complaint   Patient presents with    Diabetes     follow up     Learning assessment complete    Abuse Screening Questionnaire 9/12/2017   Do you ever feel afraid of your partner? N   Are you in a relationship with someone who physically or mentally threatens you? N   Is it safe for you to go home?  Y     Wt Readings from Last 3 Encounters:   06/04/19 173 lb 14.4 oz (78.9 kg)   09/13/18 176 lb 4.8 oz (80 kg)   03/13/18 167 lb 14.4 oz (76.2 kg)     Temp Readings from Last 3 Encounters:   06/04/19 96.4 °F (35.8 °C) (Oral)   09/13/18 97.9 °F (36.6 °C) (Oral)   03/13/18 97.9 °F (36.6 °C) (Oral)     BP Readings from Last 3 Encounters:   06/04/19 123/55   09/13/18 (!) 84/47   03/13/18 176/76     Pulse Readings from Last 3 Encounters:   06/04/19 73   09/13/18 80   03/13/18 72     Lab Results   Component Value Date/Time    Hemoglobin A1c 6.5 (H) 05/28/2019 01:31 PM    Hemoglobin A1c (POC) 8.5 03/13/2018 10:25 AM    Hemoglobin A1c, External 6.6 05/06/2016

## 2019-10-24 RX ORDER — LOSARTAN POTASSIUM 25 MG/1
TABLET ORAL
Qty: 90 TAB | Refills: 0 | Status: SHIPPED | OUTPATIENT
Start: 2019-10-24 | End: 2020-08-05

## 2019-12-18 ENCOUNTER — OFFICE VISIT (OUTPATIENT)
Dept: ENDOCRINOLOGY | Age: 64
End: 2019-12-18

## 2019-12-18 VITALS
RESPIRATION RATE: 18 BRPM | SYSTOLIC BLOOD PRESSURE: 171 MMHG | HEIGHT: 66 IN | HEART RATE: 71 BPM | DIASTOLIC BLOOD PRESSURE: 63 MMHG | TEMPERATURE: 95.7 F | BODY MASS INDEX: 29.32 KG/M2 | OXYGEN SATURATION: 100 % | WEIGHT: 182.4 LBS

## 2019-12-18 DIAGNOSIS — G62.9 NEUROPATHY: ICD-10-CM

## 2019-12-18 DIAGNOSIS — E11.22 TYPE 2 DIABETES MELLITUS WITH CHRONIC KIDNEY DISEASE ON CHRONIC DIALYSIS, WITH LONG-TERM CURRENT USE OF INSULIN (HCC): Primary | ICD-10-CM

## 2019-12-18 DIAGNOSIS — H35.00 RETINOPATHY: ICD-10-CM

## 2019-12-18 DIAGNOSIS — N18.6 ESRD ON HEMODIALYSIS (HCC): ICD-10-CM

## 2019-12-18 DIAGNOSIS — Z79.4 TYPE 2 DIABETES MELLITUS WITH CHRONIC KIDNEY DISEASE ON CHRONIC DIALYSIS, WITH LONG-TERM CURRENT USE OF INSULIN (HCC): Primary | ICD-10-CM

## 2019-12-18 DIAGNOSIS — Z99.2 TYPE 2 DIABETES MELLITUS WITH CHRONIC KIDNEY DISEASE ON CHRONIC DIALYSIS, WITH LONG-TERM CURRENT USE OF INSULIN (HCC): Primary | ICD-10-CM

## 2019-12-18 DIAGNOSIS — N18.6 TYPE 2 DIABETES MELLITUS WITH CHRONIC KIDNEY DISEASE ON CHRONIC DIALYSIS, WITH LONG-TERM CURRENT USE OF INSULIN (HCC): Primary | ICD-10-CM

## 2019-12-18 DIAGNOSIS — Z99.2 ESRD ON HEMODIALYSIS (HCC): ICD-10-CM

## 2019-12-18 LAB — HBA1C MFR BLD HPLC: 5.8 %

## 2019-12-18 RX ORDER — REPAGLINIDE 1 MG/1
1 TABLET ORAL
Qty: 90 TAB | Refills: 6 | Status: SHIPPED | OUTPATIENT
Start: 2019-12-18 | End: 2020-07-14

## 2019-12-18 RX ORDER — INSULIN GLARGINE 100 [IU]/ML
INJECTION, SOLUTION SUBCUTANEOUS
Qty: 15 ML | Refills: 3 | Status: SHIPPED | OUTPATIENT
Start: 2019-12-18 | End: 2020-01-23 | Stop reason: SDUPTHER
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
## 2019-12-18 NOTE — PROGRESS NOTES
1. Have you been to the ER, urgent care clinic since your last visit? No  Hospitalized since your last visit? No    2. Have you seen or consulted any other health care providers outside of the [address] [place] [address] since your last visit? Include any pap smears or colon screening. No     Wt Readings from Last 3 Encounters:   12/18/19 182 lb 6.4 oz (82.7 kg)   06/04/19 173 lb 14.4 oz (78.9 kg)   09/13/18 176 lb 4.8 oz (80 kg)     Temp Readings from Last 3 Encounters:   12/18/19 95.7 °F (35.4 °C) (Oral)   06/04/19 96.4 °F (35.8 °C) (Oral)   09/13/18 97.9 °F (36.6 °C) (Oral)     BP Readings from Last 3 Encounters:   12/18/19 171/63   06/04/19 123/55   09/13/18 (!) 84/47     Pulse Readings from Last 3 Encounters:   12/18/19 71   06/04/19 73   09/13/18 80     Lab Results   Component Value Date/Time    Hemoglobin A1c 6.5 (H) 05/28/2019 01:31 PM    Hemoglobin A1c (POC) 8.5 03/13/2018 10:25 AM    Hemoglobin A1c, External 6.6 05/06/2016     Patient does not have meter today.

## 2019-12-18 NOTE — PATIENT INSTRUCTIONS
--------------------------------------------------------------------------------------------    Refills    -    please call your pharmacy and have them send us a refill request    Results  -  allow up to a week for lab results to be processed and reviewed. Phone calls  -  Allow upto 24 hrs.  for non-urgent calls to be retained    Prior authorization - It may take up to 2 weeks to process, depending on your insurance    Forms  -  FMLA, DMV, patient assistance, etc. will take up to 2 weeks to process    Cancellations - please notify the office in advance if you cannot keep your appointment    Samples  - will only be dispensed at visits as supply is limited      If you are having a medical emergency call 911    --------------------------------------------------------------------------------------------    Fasting anywhere between 80 to 130 mg   Rest of the day  In between 80 to 180 mg         Decrease  crestor 20 mg at night           Check blood sugars immediately before each meal and at bedtime       Lantus  Insulin  8   units  In the morning     repaglinide 1 mg before each meal       Less than 70 mg NO INSULIN        Do not skip meals  Do not eat in between meals    Reduce carbs- pasta, rice, potatoes, bread   Do not drink juices or sodas  Donot eat peanut butter , biscuits     Do not eat sugar free cookies and cakes   Do not eat peaches, grapes , oranges and pineapples      ---------------------------------------------------------------------------------------------------

## 2020-01-23 RX ORDER — INSULIN GLARGINE 100 [IU]/ML
INJECTION, SOLUTION SUBCUTANEOUS
Qty: 45 ML | Refills: 3 | Status: SHIPPED | OUTPATIENT
Start: 2020-01-23 | End: 2020-08-05 | Stop reason: SDUPTHER

## 2020-01-23 NOTE — TELEPHONE ENCOUNTER
PCP: Nkechi Miller MD    Last appt: 12/18/2019  No future appointments. Requested Prescriptions     Pending Prescriptions Disp Refills    insulin glargine (LANTUS SOLOSTAR U-100 INSULIN) 100 unit/mL (3 mL) inpn 45 mL 3     Sig: Inject 8 units daily.

## 2020-07-14 RX ORDER — REPAGLINIDE 1 MG/1
TABLET ORAL
Qty: 90 TAB | Refills: 6 | Status: SHIPPED | OUTPATIENT
Start: 2020-07-14 | End: 2020-11-27 | Stop reason: SDUPTHER

## 2020-08-05 ENCOUNTER — OFFICE VISIT (OUTPATIENT)
Dept: ENDOCRINOLOGY | Age: 65
End: 2020-08-05
Payer: MEDICARE

## 2020-08-05 VITALS
BODY MASS INDEX: 28.96 KG/M2 | HEIGHT: 66 IN | WEIGHT: 180.2 LBS | TEMPERATURE: 97.4 F | OXYGEN SATURATION: 99 % | HEART RATE: 79 BPM | SYSTOLIC BLOOD PRESSURE: 137 MMHG | DIASTOLIC BLOOD PRESSURE: 58 MMHG | RESPIRATION RATE: 18 BRPM

## 2020-08-05 DIAGNOSIS — E11.22 TYPE 2 DIABETES MELLITUS WITH CHRONIC KIDNEY DISEASE ON CHRONIC DIALYSIS, WITH LONG-TERM CURRENT USE OF INSULIN (HCC): Primary | ICD-10-CM

## 2020-08-05 DIAGNOSIS — Z99.2 TYPE 2 DIABETES MELLITUS WITH CHRONIC KIDNEY DISEASE ON CHRONIC DIALYSIS, WITH LONG-TERM CURRENT USE OF INSULIN (HCC): Primary | ICD-10-CM

## 2020-08-05 DIAGNOSIS — Z99.2 ESRD ON HEMODIALYSIS (HCC): ICD-10-CM

## 2020-08-05 DIAGNOSIS — Z79.4 TYPE 2 DIABETES MELLITUS WITH CHRONIC KIDNEY DISEASE ON CHRONIC DIALYSIS, WITH LONG-TERM CURRENT USE OF INSULIN (HCC): Primary | ICD-10-CM

## 2020-08-05 DIAGNOSIS — N18.6 ESRD ON HEMODIALYSIS (HCC): ICD-10-CM

## 2020-08-05 DIAGNOSIS — H54.8 LEGALLY BLIND: ICD-10-CM

## 2020-08-05 DIAGNOSIS — H35.00 RETINOPATHY: ICD-10-CM

## 2020-08-05 DIAGNOSIS — N18.6 TYPE 2 DIABETES MELLITUS WITH CHRONIC KIDNEY DISEASE ON CHRONIC DIALYSIS, WITH LONG-TERM CURRENT USE OF INSULIN (HCC): Primary | ICD-10-CM

## 2020-08-05 LAB — HBA1C MFR BLD HPLC: 5.9 %

## 2020-08-05 PROCEDURE — 3044F HG A1C LEVEL LT 7.0%: CPT | Performed by: INTERNAL MEDICINE

## 2020-08-05 PROCEDURE — G8419 CALC BMI OUT NRM PARAM NOF/U: HCPCS | Performed by: INTERNAL MEDICINE

## 2020-08-05 PROCEDURE — 83036 HEMOGLOBIN GLYCOSYLATED A1C: CPT | Performed by: INTERNAL MEDICINE

## 2020-08-05 PROCEDURE — G8510 SCR DEP NEG, NO PLAN REQD: HCPCS | Performed by: INTERNAL MEDICINE

## 2020-08-05 PROCEDURE — G8400 PT W/DXA NO RESULTS DOC: HCPCS | Performed by: INTERNAL MEDICINE

## 2020-08-05 PROCEDURE — 1090F PRES/ABSN URINE INCON ASSESS: CPT | Performed by: INTERNAL MEDICINE

## 2020-08-05 PROCEDURE — 99214 OFFICE O/P EST MOD 30 MIN: CPT | Performed by: INTERNAL MEDICINE

## 2020-08-05 PROCEDURE — 2022F DILAT RTA XM EVC RTNOPTHY: CPT | Performed by: INTERNAL MEDICINE

## 2020-08-05 PROCEDURE — G9231 DOC ESRD DIA TRANS PREG: HCPCS | Performed by: INTERNAL MEDICINE

## 2020-08-05 PROCEDURE — 3017F COLORECTAL CA SCREEN DOC REV: CPT | Performed by: INTERNAL MEDICINE

## 2020-08-05 PROCEDURE — G8427 DOCREV CUR MEDS BY ELIG CLIN: HCPCS | Performed by: INTERNAL MEDICINE

## 2020-08-05 PROCEDURE — G8536 NO DOC ELDER MAL SCRN: HCPCS | Performed by: INTERNAL MEDICINE

## 2020-08-05 PROCEDURE — 1101F PT FALLS ASSESS-DOCD LE1/YR: CPT | Performed by: INTERNAL MEDICINE

## 2020-08-05 RX ORDER — CETIRIZINE HCL 10 MG
TABLET ORAL
COMMUNITY
End: 2021-10-19

## 2020-08-05 RX ORDER — ROSUVASTATIN CALCIUM 20 MG/1
20 TABLET, COATED ORAL
Qty: 90 TAB | Refills: 4 | Status: SHIPPED | OUTPATIENT
Start: 2020-08-05 | End: 2021-08-07

## 2020-08-05 RX ORDER — INSULIN GLARGINE 100 [IU]/ML
INJECTION, SOLUTION SUBCUTANEOUS
Qty: 15 ML | Refills: 3 | Status: SHIPPED | OUTPATIENT
Start: 2020-08-05

## 2020-08-05 RX ORDER — SACUBITRIL AND VALSARTAN 49; 51 MG/1; MG/1
1 TABLET, FILM COATED ORAL 2 TIMES DAILY
COMMUNITY

## 2020-08-05 NOTE — LETTER
Medical Screening Exam, Nonurgent  You have had a medical screening exam. The results show that you don’t have a condition that needs to be treated in the Emergency Department.  You can safely wait until you can see your health care provider for evaluation or treatment. It is up to you to make an appointment for follow-up care.  Medical emergencies  If you think you have a medical emergency, please come to the Emergency Department. That’s what we are here for. A medical emergency might be severe pain. It might be a condition that gets worse. Or it might be problems with a pregnancy.  The Emergency Department is open to all who need treatment. But if you don’t think you have a serious or life-threatening problem, try these other choices.  If you have a primary care doctor:  Call your doctor before coming to the Emergency Department.  After office hours, someone from your doctor’s office is on-call by phone. The person on-call may be able to give you advice over the phone on how to take care of the problem  You may be able to get an appointment to see your doctor.  If you don’t have a primary care doctor:  Call the referral doctor or clinic shown below during office hours. You should be able to make an appointment to be seen.  If you aren’t sure whether you are having an emergency, you can always return to the Emergency Department to be looked at.   Phone advice from the Emergency Department  We are here 24 hours a day to give emergency care. But this hospital does not give phone advice for medical conditions. If you need advice for a condition that can’t wait to be seen by your doctor, you will need to come back to this facility in person.     © 5606-1232 [employer]. [address] [place] [address], [place] [address]. All rights reserved. This information is not intended as a substitute for professional medical care. Always follow your healthcare professional's instructions.         8/5/20 Patient: Zelda Stringer YOB: 1955 Date of Visit: 8/5/2020 Allyson Lakhani MD 
[address] [facility] [address] [facility] [address] VIA Facsimile: [phone] Dear Allyson Lakhani MD, Thank you for referring Ms. Rocael Todd to [address] [address] for evaluation. My notes for this consultation are attached. If you have questions, please do not hesitate to call me. I look forward to following your patient along with you. Sincerely, Lisa Roa MD
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
## 2020-08-05 NOTE — PROGRESS NOTES
1. Have you been to the ER, urgent care clinic since your last visit? No  Hospitalized since your last visit? No    2. Have you seen or consulted any other health care providers outside of the [address] [place] [address] since your last visit? Include any pap smears or colon screening. No     Wt Readings from Last 3 Encounters:   08/05/20 180 lb 3.2 oz (81.7 kg)   12/18/19 182 lb 6.4 oz (82.7 kg)   06/04/19 173 lb 14.4 oz (78.9 kg)     Temp Readings from Last 3 Encounters:   08/05/20 97.4 °F (36.3 °C) (Oral)   12/18/19 95.7 °F (35.4 °C) (Oral)   06/04/19 96.4 °F (35.8 °C) (Oral)     BP Readings from Last 3 Encounters:   08/05/20 137/58   12/18/19 171/63   06/04/19 123/55     Pulse Readings from Last 3 Encounters:   08/05/20 79   12/18/19 71   06/04/19 73     Lab Results   Component Value Date/Time    Hemoglobin A1c 6.5 (H) 05/28/2019 01:31 PM    Hemoglobin A1c (POC) 5.8 12/18/2019 09:34 AM    Hemoglobin A1c, External 6.6 05/06/2016     Patient does not have meter today.

## 2020-08-05 NOTE — PATIENT INSTRUCTIONS
-------------------------------------------------------------------------------------------- Refills    -    please call your pharmacy and have them send us a refill request 
 
Results  -  allow up to a week for lab results to be processed and reviewed. Phone calls  -  Allow upto 24 hrs. for non-urgent calls to be retained Prior authorization - It may take up to 2 weeks to process, depending on your insurance Forms  -  FMLA, DMV, patient assistance, etc. will take up to 2 weeks to process Cancellations - please notify the office in advance if you cannot keep your appointment Samples  - will only be dispensed at visits as supply is limited If you are having a medical emergency call 911 
 
-------------------------------------------------------------------------------------------- Fasting anywhere between 80 to 130 mg Rest of the day  In between 80 to 180 mg  
 
 
 
Stay  on rosuvostatin  20 mg at night Check blood sugars immediately before each meal and at bedtime Lantus  Insulin  8   units  In the morning  
 
repaglinide 1 mg before each meal  
 
 
Less than 70 mg NO INSULIN 
 
 
 
Do not skip meals Do not eat in between meals Reduce carbs- pasta, rice, potatoes, bread Do not drink juices or sodas Donot eat peanut butter , biscuits Do not eat sugar free cookies and cakes Do not eat peaches, grapes , oranges and pineapples 
 
 
---------------------------------------------------------------------------------------------------

## 2020-09-02 ENCOUNTER — OP HISTORICAL/CONVERTED ENCOUNTER (OUTPATIENT)
Dept: OTHER | Age: 65
End: 2020-09-02

## 2020-11-27 RX ORDER — PEN NEEDLE, DIABETIC 31 GX3/16"
NEEDLE, DISPOSABLE MISCELLANEOUS
Qty: 100 PEN NEEDLE | Refills: 3 | Status: SHIPPED | OUTPATIENT
Start: 2020-11-27 | End: 2021-11-04

## 2020-11-27 RX ORDER — REPAGLINIDE 1 MG/1
TABLET ORAL
Qty: 270 TAB | Refills: 2 | Status: SHIPPED | OUTPATIENT
Start: 2020-11-27 | End: 2020-12-21 | Stop reason: SDUPTHER

## 2020-12-21 RX ORDER — REPAGLINIDE 1 MG/1
TABLET ORAL
Qty: 90 TAB | Refills: 6 | Status: SHIPPED | OUTPATIENT
Start: 2020-12-21 | End: 2021-10-19 | Stop reason: DRUGHIGH

## 2021-01-15 ENCOUNTER — TRANSCRIBE ORDER (OUTPATIENT)
Dept: SCHEDULING | Age: 66
End: 2021-01-15

## 2021-01-15 DIAGNOSIS — I65.29 CAROTID ARTERY STENOSIS: ICD-10-CM

## 2021-01-15 DIAGNOSIS — I77.9 CAROTID ARTERY DISEASE (HCC): Primary | ICD-10-CM

## 2021-01-15 DIAGNOSIS — I25.10 CORONARY HEART DISEASE: ICD-10-CM

## 2021-01-21 ENCOUNTER — HOSPITAL ENCOUNTER (OUTPATIENT)
Dept: NUCLEAR MEDICINE | Age: 66
Discharge: HOME | End: 2021-01-21
Attending: INTERNAL MEDICINE
Payer: MEDICARE

## 2021-01-21 ENCOUNTER — HOSPITAL ENCOUNTER (OUTPATIENT)
Dept: NON INVASIVE DIAGNOSTICS | Age: 66
Discharge: HOME | End: 2021-01-21
Attending: INTERNAL MEDICINE
Payer: MEDICARE

## 2021-01-21 VITALS
HEIGHT: 66 IN | DIASTOLIC BLOOD PRESSURE: 100 MMHG | BODY MASS INDEX: 28.93 KG/M2 | SYSTOLIC BLOOD PRESSURE: 208 MMHG | WEIGHT: 180 LBS

## 2021-01-21 DIAGNOSIS — I77.9 CAROTID ARTERY DISEASE (HCC): ICD-10-CM

## 2021-01-21 DIAGNOSIS — I65.29 CAROTID ARTERY STENOSIS: ICD-10-CM

## 2021-01-21 DIAGNOSIS — I25.10 CORONARY HEART DISEASE: ICD-10-CM

## 2021-01-21 LAB
STRESS BASELINE DIAS BP: 100 MMHG
STRESS BASELINE HR: 73 BPM
STRESS BASELINE SYS BP: 208 MMHG
STRESS PERCENT HR ACHIEVED: 85 %
STRESS POST PEAK HR: 131 BPM
STRESS ST DEPRESSION: 0 MM
STRESS ST ELEVATION: 0 MM
STRESS STAGE 1 DURATION: NORMAL MIN:SEC
STRESS STAGE 1 HR: 90 BPM
STRESS STAGE 2 BP: NORMAL MMHG
STRESS STAGE 2 DURATION: NORMAL MIN:SEC
STRESS STAGE 2 HR: 100 BPM
STRESS STAGE 3 BP: NORMAL MMHG
STRESS STAGE 3 DURATION: NORMAL MIN:SEC
STRESS STAGE 3 HR: 95 BPM
STRESS STAGE 4 BP: NORMAL MMHG
STRESS STAGE 4 DURATION: NORMAL MIN:SEC
STRESS STAGE 4 HR: 92 BPM
STRESS TARGET HR: 154 BPM

## 2021-01-21 PROCEDURE — 74011250636 HC RX REV CODE- 250/636

## 2021-01-21 PROCEDURE — A9500 TC99M SESTAMIBI: HCPCS

## 2021-01-21 RX ADMIN — REGADENOSON 0.4 MG: 0.08 INJECTION, SOLUTION INTRAVENOUS at 11:12

## 2021-03-02 ENCOUNTER — IMMUNIZATION (OUTPATIENT)
Dept: INTERNAL MEDICINE CLINIC | Age: 66
End: 2021-03-02
Payer: MEDICARE

## 2021-03-02 DIAGNOSIS — Z23 ENCOUNTER FOR IMMUNIZATION: Primary | ICD-10-CM

## 2021-03-02 PROCEDURE — 91300 COVID-19, MRNA, LNP-S, PF, 30MCG/0.3ML DOSE(PFIZER): CPT | Performed by: FAMILY MEDICINE

## 2021-03-02 PROCEDURE — 0001A COVID-19, MRNA, LNP-S, PF, 30MCG/0.3ML DOSE(PFIZER): CPT | Performed by: FAMILY MEDICINE

## 2021-03-23 ENCOUNTER — IMMUNIZATION (OUTPATIENT)
Dept: INTERNAL MEDICINE CLINIC | Age: 66
End: 2021-03-23
Payer: MEDICARE

## 2021-03-23 DIAGNOSIS — Z23 ENCOUNTER FOR IMMUNIZATION: Primary | ICD-10-CM

## 2021-03-23 PROCEDURE — 91300 COVID-19, MRNA, LNP-S, PF, 30MCG/0.3ML DOSE(PFIZER): CPT | Performed by: FAMILY MEDICINE

## 2021-03-23 PROCEDURE — 0002A COVID-19, MRNA, LNP-S, PF, 30MCG/0.3ML DOSE(PFIZER): CPT | Performed by: FAMILY MEDICINE

## 2021-04-22 ENCOUNTER — OFFICE VISIT (OUTPATIENT)
Dept: ENDOCRINOLOGY | Age: 66
End: 2021-04-22
Payer: MEDICARE

## 2021-04-22 VITALS
SYSTOLIC BLOOD PRESSURE: 172 MMHG | TEMPERATURE: 96.8 F | HEART RATE: 69 BPM | OXYGEN SATURATION: 99 % | HEIGHT: 66 IN | WEIGHT: 167 LBS | DIASTOLIC BLOOD PRESSURE: 72 MMHG | BODY MASS INDEX: 26.84 KG/M2 | RESPIRATION RATE: 20 BRPM

## 2021-04-22 DIAGNOSIS — Z79.4 TYPE 2 DIABETES MELLITUS WITH CHRONIC KIDNEY DISEASE ON CHRONIC DIALYSIS, WITH LONG-TERM CURRENT USE OF INSULIN (HCC): Primary | ICD-10-CM

## 2021-04-22 DIAGNOSIS — E11.22 TYPE 2 DIABETES MELLITUS WITH CHRONIC KIDNEY DISEASE ON CHRONIC DIALYSIS, WITH LONG-TERM CURRENT USE OF INSULIN (HCC): Primary | ICD-10-CM

## 2021-04-22 DIAGNOSIS — H54.8 LEGALLY BLIND: ICD-10-CM

## 2021-04-22 DIAGNOSIS — H35.00 RETINOPATHY: ICD-10-CM

## 2021-04-22 DIAGNOSIS — N18.6 ESRD ON HEMODIALYSIS (HCC): ICD-10-CM

## 2021-04-22 DIAGNOSIS — Z99.2 ESRD ON HEMODIALYSIS (HCC): ICD-10-CM

## 2021-04-22 DIAGNOSIS — Z99.2 TYPE 2 DIABETES MELLITUS WITH CHRONIC KIDNEY DISEASE ON CHRONIC DIALYSIS, WITH LONG-TERM CURRENT USE OF INSULIN (HCC): Primary | ICD-10-CM

## 2021-04-22 DIAGNOSIS — N18.6 TYPE 2 DIABETES MELLITUS WITH CHRONIC KIDNEY DISEASE ON CHRONIC DIALYSIS, WITH LONG-TERM CURRENT USE OF INSULIN (HCC): Primary | ICD-10-CM

## 2021-04-22 LAB — HBA1C MFR BLD HPLC: 5.2 %

## 2021-04-22 PROCEDURE — G9231 DOC ESRD DIA TRANS PREG: HCPCS | Performed by: INTERNAL MEDICINE

## 2021-04-22 PROCEDURE — 83036 HEMOGLOBIN GLYCOSYLATED A1C: CPT | Performed by: INTERNAL MEDICINE

## 2021-04-22 PROCEDURE — 99214 OFFICE O/P EST MOD 30 MIN: CPT | Performed by: INTERNAL MEDICINE

## 2021-04-22 PROCEDURE — 1101F PT FALLS ASSESS-DOCD LE1/YR: CPT | Performed by: INTERNAL MEDICINE

## 2021-04-22 PROCEDURE — 3044F HG A1C LEVEL LT 7.0%: CPT | Performed by: INTERNAL MEDICINE

## 2021-04-22 PROCEDURE — G8427 DOCREV CUR MEDS BY ELIG CLIN: HCPCS | Performed by: INTERNAL MEDICINE

## 2021-04-22 PROCEDURE — 2022F DILAT RTA XM EVC RTNOPTHY: CPT | Performed by: INTERNAL MEDICINE

## 2021-04-22 PROCEDURE — G8536 NO DOC ELDER MAL SCRN: HCPCS | Performed by: INTERNAL MEDICINE

## 2021-04-22 PROCEDURE — G8432 DEP SCR NOT DOC, RNG: HCPCS | Performed by: INTERNAL MEDICINE

## 2021-04-22 PROCEDURE — 3017F COLORECTAL CA SCREEN DOC REV: CPT | Performed by: INTERNAL MEDICINE

## 2021-04-22 PROCEDURE — G8419 CALC BMI OUT NRM PARAM NOF/U: HCPCS | Performed by: INTERNAL MEDICINE

## 2021-04-22 PROCEDURE — G8400 PT W/DXA NO RESULTS DOC: HCPCS | Performed by: INTERNAL MEDICINE

## 2021-04-22 PROCEDURE — 1090F PRES/ABSN URINE INCON ASSESS: CPT | Performed by: INTERNAL MEDICINE

## 2021-04-22 RX ORDER — CLOPIDOGREL BISULFATE 75 MG/1
75 TABLET ORAL DAILY
COMMUNITY

## 2021-04-22 RX ORDER — AMLODIPINE BESYLATE 10 MG/1
10 TABLET ORAL DAILY
COMMUNITY

## 2021-04-22 RX ORDER — FLASH GLUCOSE SENSOR
KIT MISCELLANEOUS
Qty: 2 KIT | Refills: 5 | Status: SHIPPED | OUTPATIENT
Start: 2021-04-22 | End: 2021-10-19
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
## 2021-04-22 NOTE — PROGRESS NOTES
Uriel Leiva is a 78 y.o. female here for   Chief Complaint   Patient presents with    Diabetes       1. Have you been to the ER, urgent care clinic since your last visit? Hospitalized since your last visit? -no    2. Have you seen or consulted any other health care providers outside of the [address] [place] [address] since your last visit? Include any pap smears or colon screening. -PCP

## 2021-04-22 NOTE — PATIENT INSTRUCTIONS
SPECIFIC INSTRUCTIONS BELOW     Fasting anywhere between 80 to 130 mg   Rest of the day  In between 80 to 180 mg       Stay  on rosuvostatin  20 mg at night       Check blood sugars immediately before each meal and at bedtime       Lantus  Insulin  8   units  In the morning     repaglinide 1 mg before each meal       Less than 70 mg NO INSULIN        Do not skip meals  Do not eat in between meals    Reduce carbs- pasta, rice, potatoes, bread   Do not drink juices or sodas  Donot eat peanut butter , biscuits     Do not eat sugar free cookies and cakes   Do not eat peaches, grapes , oranges and pineapples      ---------------------------------------------------------------------------------------------------      PAY ATTENTION TO THESE GENERAL INSTRUCTIONS     -ANY tests other than blood work, which you opt to do  outside [facility] imaging facilities, you are responsible for prior authorizations if  required   - HEALTH MAINTENANCE IS NOT GOING TO BE UP TO DATE ON YOUR AVS- PLEASE IGNORE   - YOUR MED LIST IS NOT UP TO DATE AS SOME CHANGES ARE BEING MADE AFTER THE VISIT - FOLLOW SPECIFIC INSTRUCTIONS  ABOVE     Results     *Normal results will not be notified by a phone call starting January 1 2021   *If you have an upcoming visit, the results will be discussed at the visit   *Please sign up for MY CHART if you want access to your lab and test results  *Abnormal results which require immediate attention will be notified by phone call   *Abnormal results which do not require immediate assistance will be notified in 1-2 weeks       Refills    -    have your pharmacy send us a refill request  Phone calls  -  Allow  24 hrs.  for non-urgent calls to be returned  Prior authorization - It may take 2-4 weeks to process  Forms  -  FMLA, DMV etc., will take up to 2 weeks to process  Cancellations - please notify the office 2 days in advance   Samples  - will only be dispensed at visits --------------------------------------------------------------------------------------------    To establish the remote access to your glucose log - follow these steps     - FIRST , download freestyle [employer] barbi onto your smart phone to use it as a reader to scan the sensor for blood sugars     -establish \" [employer] \" account by visiting  [employer] website  [email or web address]    -Accept our invite waiting for you in the email which you have used to set up the account  Or   Enter  \"[facility]\"  ( our practice unique id )   By doing so, you link up to our office account       - if you are using phone as your scanner , we can access your glucose data. - If you are using the ,  Upload the data on to the account via a computer/laptop     How to do that can be accessed again by going onto the website  [place]                 --------------------------------------------------------------------------------------------      Follow the steps  to get started on  [facility]  for Medicare patients       1. Keep a log of blood sugars by checking 4 times a day    2. you must be on insulin shots at least 3 times a day   3. Contact one of the following  DME suppliers  and find out who accepts your plan   4.  Give that supplier  our  office info  and fax number        [employer]  is best  for  freestyle OPAL  - [phone] [facility]   [phone]      [facility]   [phone]   ext  [address] [place] [address]  [phone] [facility]   [phone] ( least preferred )       [address]  [phone] ( [address] preferred )      [employer]   [phone]         ---------------------------------------------------------------------------------------------------------------------------------------------

## 2021-04-30 ENCOUNTER — TELEPHONE (OUTPATIENT)
Dept: ENDOCRINOLOGY | Age: 66
End: 2021-04-30

## 2021-04-30 RX ORDER — FLASH GLUCOSE SCANNING READER
EACH MISCELLANEOUS
Qty: 1 EACH | Refills: 0 | Status: SHIPPED | OUTPATIENT
Start: 2021-04-30 | End: 2021-10-19

## 2021-08-05 DIAGNOSIS — Z99.2 TYPE 2 DIABETES MELLITUS WITH CHRONIC KIDNEY DISEASE ON CHRONIC DIALYSIS, WITH LONG-TERM CURRENT USE OF INSULIN (HCC): ICD-10-CM

## 2021-08-05 DIAGNOSIS — E11.22 TYPE 2 DIABETES MELLITUS WITH CHRONIC KIDNEY DISEASE ON CHRONIC DIALYSIS, WITH LONG-TERM CURRENT USE OF INSULIN (HCC): ICD-10-CM

## 2021-08-05 DIAGNOSIS — H35.00 RETINOPATHY: ICD-10-CM

## 2021-08-05 DIAGNOSIS — Z99.2 ESRD ON HEMODIALYSIS (HCC): ICD-10-CM

## 2021-08-05 DIAGNOSIS — N18.6 ESRD ON HEMODIALYSIS (HCC): ICD-10-CM

## 2021-08-05 DIAGNOSIS — N18.6 TYPE 2 DIABETES MELLITUS WITH CHRONIC KIDNEY DISEASE ON CHRONIC DIALYSIS, WITH LONG-TERM CURRENT USE OF INSULIN (HCC): ICD-10-CM

## 2021-08-05 DIAGNOSIS — Z79.4 TYPE 2 DIABETES MELLITUS WITH CHRONIC KIDNEY DISEASE ON CHRONIC DIALYSIS, WITH LONG-TERM CURRENT USE OF INSULIN (HCC): ICD-10-CM

## 2021-08-05 DIAGNOSIS — H54.8 LEGALLY BLIND: ICD-10-CM

## 2021-08-07 RX ORDER — ROSUVASTATIN CALCIUM 20 MG/1
TABLET, COATED ORAL
Qty: 90 TABLET | Refills: 3 | Status: SHIPPED | OUTPATIENT
Start: 2021-08-07

## 2021-10-19 ENCOUNTER — OFFICE VISIT (OUTPATIENT)
Dept: ENDOCRINOLOGY | Age: 66
End: 2021-10-19
Payer: MEDICARE

## 2021-10-19 VITALS
BODY MASS INDEX: 24.53 KG/M2 | RESPIRATION RATE: 16 BRPM | OXYGEN SATURATION: 97 % | WEIGHT: 152.6 LBS | HEIGHT: 66 IN | HEART RATE: 73 BPM | SYSTOLIC BLOOD PRESSURE: 141 MMHG | DIASTOLIC BLOOD PRESSURE: 57 MMHG | TEMPERATURE: 98.7 F

## 2021-10-19 DIAGNOSIS — H35.00 RETINOPATHY: ICD-10-CM

## 2021-10-19 DIAGNOSIS — E11.22 TYPE 2 DIABETES MELLITUS WITH CHRONIC KIDNEY DISEASE ON CHRONIC DIALYSIS, WITH LONG-TERM CURRENT USE OF INSULIN (HCC): Primary | ICD-10-CM

## 2021-10-19 DIAGNOSIS — M81.0 SENILE OSTEOPOROSIS: ICD-10-CM

## 2021-10-19 DIAGNOSIS — N25.0 RENAL OSTEODYSTROPHY: ICD-10-CM

## 2021-10-19 DIAGNOSIS — Z99.2 ESRD ON HEMODIALYSIS (HCC): ICD-10-CM

## 2021-10-19 DIAGNOSIS — N18.6 ESRD ON HEMODIALYSIS (HCC): ICD-10-CM

## 2021-10-19 DIAGNOSIS — Z99.2 TYPE 2 DIABETES MELLITUS WITH CHRONIC KIDNEY DISEASE ON CHRONIC DIALYSIS, WITH LONG-TERM CURRENT USE OF INSULIN (HCC): Primary | ICD-10-CM

## 2021-10-19 DIAGNOSIS — N18.6 TYPE 2 DIABETES MELLITUS WITH CHRONIC KIDNEY DISEASE ON CHRONIC DIALYSIS, WITH LONG-TERM CURRENT USE OF INSULIN (HCC): Primary | ICD-10-CM

## 2021-10-19 DIAGNOSIS — Z79.4 TYPE 2 DIABETES MELLITUS WITH CHRONIC KIDNEY DISEASE ON CHRONIC DIALYSIS, WITH LONG-TERM CURRENT USE OF INSULIN (HCC): Primary | ICD-10-CM

## 2021-10-19 LAB
HBA1C MFR BLD HPLC: 5.1 %
HBA1C MFR BLD HPLC: 5.1 %

## 2021-10-19 PROCEDURE — 83036 HEMOGLOBIN GLYCOSYLATED A1C: CPT | Performed by: INTERNAL MEDICINE

## 2021-10-19 PROCEDURE — 1101F PT FALLS ASSESS-DOCD LE1/YR: CPT | Performed by: INTERNAL MEDICINE

## 2021-10-19 PROCEDURE — G8536 NO DOC ELDER MAL SCRN: HCPCS | Performed by: INTERNAL MEDICINE

## 2021-10-19 PROCEDURE — 99214 OFFICE O/P EST MOD 30 MIN: CPT | Performed by: INTERNAL MEDICINE

## 2021-10-19 PROCEDURE — 1090F PRES/ABSN URINE INCON ASSESS: CPT | Performed by: INTERNAL MEDICINE

## 2021-10-19 PROCEDURE — G8427 DOCREV CUR MEDS BY ELIG CLIN: HCPCS | Performed by: INTERNAL MEDICINE

## 2021-10-19 PROCEDURE — 2022F DILAT RTA XM EVC RTNOPTHY: CPT | Performed by: INTERNAL MEDICINE

## 2021-10-19 PROCEDURE — G8420 CALC BMI NORM PARAMETERS: HCPCS | Performed by: INTERNAL MEDICINE

## 2021-10-19 PROCEDURE — 3044F HG A1C LEVEL LT 7.0%: CPT | Performed by: INTERNAL MEDICINE

## 2021-10-19 PROCEDURE — G9231 DOC ESRD DIA TRANS PREG: HCPCS | Performed by: INTERNAL MEDICINE

## 2021-10-19 PROCEDURE — 3017F COLORECTAL CA SCREEN DOC REV: CPT | Performed by: INTERNAL MEDICINE

## 2021-10-19 PROCEDURE — G8510 SCR DEP NEG, NO PLAN REQD: HCPCS | Performed by: INTERNAL MEDICINE

## 2021-10-19 RX ORDER — FLASH GLUCOSE SCANNING READER
EACH MISCELLANEOUS
Qty: 1 EACH | Refills: 0 | Status: SHIPPED | OUTPATIENT
Start: 2021-10-19 | End: 2022-07-28

## 2021-10-19 RX ORDER — FUROSEMIDE 20 MG/1
20 TABLET ORAL DAILY
COMMUNITY

## 2021-10-19 RX ORDER — FLASH GLUCOSE SENSOR
KIT MISCELLANEOUS
Qty: 2 KIT | Refills: 5 | Status: SHIPPED | OUTPATIENT
Start: 2021-10-19 | End: 2022-04-21 | Stop reason: ALTCHOICE

## 2021-10-19 NOTE — PATIENT INSTRUCTIONS
SPECIFIC INSTRUCTIONS BELOW     She has to buy kaykay sensors - because of  Her being  On one shot of insulin     --------------------------------------------------------------------------------    Stay  on rosuvostatin  20 mg at night       Check blood sugars immediately before each meal and at bedtime     Lantus  Insulin  8   units  In the morning   repaglinide 0.5 mg   before each meal , stop 1 mg dose     Less than 70 mg NO INSULIN        Do not skip meals  Do not eat in between meals    Reduce carbs- pasta, rice, potatoes, bread   Do not drink juices or sodas  Donot eat peanut butter , biscuits     Do not eat sugar free cookies and cakes   Do not eat peaches, grapes , oranges and pineapples              -------------PAY ATTENTION TO THESE GENERAL INSTRUCTIONS -----------------      - The medications prescribed at this visit will not be available at pharmacy until 6 pm       - YOUR MED LIST IS NOT UP TO DATE AS SOME CHANGES ARE BEING MADE AFTER THE VISIT - FOLLOW SPECIFIC INSTRUCTIONS  ABOVE     -ANY tests other than blood work, which you opt to do  outside the  [facility] imaging facilities, you are responsible for prior authorizations if  required    - [address] UP TO DATE ON YOUR AVS- PLEASE IGNORE     Results     *Normal results will not be notified by a phone call starting January 1 2021   *If you have an upcoming visit, the results will be discussed at the visit   *Please sign up for MY CHART if you want access to your lab and test results  *Abnormal results which require immediate attention will be notified by phone call   *Abnormal results which do not require immediate assistance will be notified in 1-2 weeks       Refills    -    have your pharmacy send us a refill request . Refills are done max for one year and a visit is a must before refills are extended    Follow up appointments -  highly encourage you to make it when you are checking out.  We can accommodate you into the schedule based on your clinical situation, but not for extending refills beyond a year. Labs are important to give refills and is important to get labs before the visit     Phone calls  -  Allow  24 hrs.  for non-urgent calls to be returned  Prior authorization - It may take 2-4 weeks to process  Forms  -  FMLA, DMV etc., will take up to 2 weeks to process  Cancellations - please notify the office 2 days in advance   Samples  - will only be dispensed at visits       If not showing for the appointments and cancelling appointments within 24 hours are kept track of and three  of such situations in  two consecutive years will likely be considered for termination from the practice    -------------------------------------------------------------------------------------------------------------------

## 2021-10-19 NOTE — PROGRESS NOTES
HISTORY OF PRESENT ILLNESS  Sabino De Los Santos is a 78 y.o. female. HPI  Patient here for  F/u after last visit of Type 2 diabetes mellitus  From April 2021      Patient Is accompanied by  Her    She is keen on getting CGM  Dr Koki Zayas performed DEXA and requested for plan of care       April 2021     She had a low sugar per family and grand daughter felt the need to check sugars   Pt has no meter    Daughter is eager to get mother CGM       August 2020     Accompanied by her   Requesting freestyle [place]   She has no other complaints   Lost 2 lbs      Old history   Referred : byAnnalee ariza   H/o diabetes for 10  years  Current A1C is 9 %  and symptoms/problems include fatigue   Current diabetic medications include Lantus 45 at night , tradjenta 5 mg ( non compliant )      Review of Systems   Constitutional: Positive for malaise/fatigue   Eyes:  legally blind  Psychiatric/Behavioral: Negative for depression and memory loss. The patient does not have insomnia. Physical Exam   Constitutional: She is oriented to person, place, and time. She appears well-developed and well-nourished. Eyes: Legally blind   Psychiatric: She has a normal mood and affect. ASSESSMENT and PLAN    1.  Type 2 DM uncontrolled  On hemodialysis : A1c is    5.1 %      From   Oct 2021  -    By POC      Compared to 6.5 %     From     August 2020 ,   5.8 %     From    Today dec 2019  By POC   Compared to    6.5 %   FROM MAY 2019   COMPARED TO    6.6 %      From sept 2018    Compared to   8.5  %     From    Today march 2018    Compared to  5.3 %       From   Today      May 2017          She has several diabetic complications retinopathy and nephropathy    gladly is  Supervising  Her , but he works part time since on HD       Pt can benefit from CGMS/dexcom given that she is blind and dependant on HD  There is no   METER  Information TO KNOW THE GLYCEMIC EXCURSIONS   She had a few  LOW SUGARS  In the recent past     She is given the information for  supply companies - for dexcom    Continue Lantus 8 units a day in the morning times  prandin  At 1 mg right before meals  ( Not sure if she is compliant )    Patient is advised to check blood sugars 3-4  times daily . reviewed medications and side effects in detail  lab results and schedule of future lab studies reviewed with patient        August 2020     She has several diabetic complications retinopathy and nephropathy    gladly is  Supervising  Her , but he works part time since on HD     Pt can benefit from CGMS/dexcom given that she is blind and dependant on HD  There is no   METER  Information TO KNOW THE GLYCEMIC EXCURSIONS   DENIES LOW SUGARS   She is given the information for  supply companies - for freestyle kaykay      Continue Lantus 8 units a day in the morning times   prandin  At 1 mg right before meals  (  Not sure if she is compliant )        2. Hypoglycemia : At high risk because of blindness, being on insulin, dependant on others, and inability to check more often    Educated on treating the hypoglycemia and again admonished to take care of herself       3. HTN : un controlled with improved compliance since the long discussion with her    continue imdur,  Coreg and hydralazine ,  entresto  . 4. Dyslipidemia : continue crestor 20 .       5. Diabetic complications :     A. Retinopathy-YES  Legally blind   Dependant on her  for medications and care        B. Nephropathy - YES   On hemodialysis   F/u  With  Dr. Cara Donald. Peripheral Neuropathy - YES  , mild  educated on this disease and indicated improvement with good and stable glycemic control    D.  PAD : YES         E : CAD : YES, f/u with Dr. Rochelle Tejeda  Had CABG, and has PCM      6.  Osteoporosis / Renal osteodystrophy    It is hard to differentiate    Date : sept 30 2021   Bone DEXA  ap spine T-score 0.6; left femoral Neck T- score -2.8, right femoral neck T-score n/a    Because of being on dialysis, there is a risk for hypocalcemia - going onto  Prolia   No other meds are approved for this condition     It is good if the bones are managed by calcium and phos balance in the blood       Reviewed results with patient and discussed the labs being ordered today/bnv  Patient voiced understanding of plan of care         F/u in 6 months

## 2021-11-04 RX ORDER — PEN NEEDLE, DIABETIC 31 GX3/16"
NEEDLE, DISPOSABLE MISCELLANEOUS
Qty: 100 PEN NEEDLE | Refills: 3 | Status: SHIPPED | OUTPATIENT
Start: 2021-11-04 | End: 2022-04-21 | Stop reason: SDUPTHER

## 2022-03-11 ENCOUNTER — HOSPITAL ENCOUNTER (EMERGENCY)
Age: 67
Discharge: HOME | End: 2022-03-11
Attending: EMERGENCY MEDICINE
Payer: MEDICARE

## 2022-03-11 ENCOUNTER — APPOINTMENT (OUTPATIENT)
Dept: GENERAL RADIOLOGY | Age: 67
End: 2022-03-11
Attending: EMERGENCY MEDICINE
Payer: MEDICARE

## 2022-03-11 ENCOUNTER — APPOINTMENT (OUTPATIENT)
Dept: CT IMAGING | Age: 67
End: 2022-03-11
Attending: EMERGENCY MEDICINE
Payer: MEDICARE

## 2022-03-11 VITALS
DIASTOLIC BLOOD PRESSURE: 77 MMHG | HEIGHT: 66 IN | WEIGHT: 157 LBS | HEART RATE: 100 BPM | TEMPERATURE: 98.1 F | OXYGEN SATURATION: 95 % | SYSTOLIC BLOOD PRESSURE: 163 MMHG | RESPIRATION RATE: 17 BRPM | BODY MASS INDEX: 25.23 KG/M2

## 2022-03-11 DIAGNOSIS — R06.02 SOB (SHORTNESS OF BREATH): ICD-10-CM

## 2022-03-11 DIAGNOSIS — R05.8 POST-VIRAL COUGH SYNDROME: ICD-10-CM

## 2022-03-11 DIAGNOSIS — R05.9 COUGH: Primary | ICD-10-CM

## 2022-03-11 LAB
ALBUMIN SERPL-MCNC: 3.1 G/DL (ref 3.5–5)
ALBUMIN/GLOB SERPL: 0.7 {RATIO} (ref 1.1–2.2)
ALP SERPL-CCNC: 186 U/L (ref 45–117)
ALT SERPL-CCNC: 43 U/L (ref 12–78)
ANION GAP SERPL CALC-SCNC: 6 MMOL/L (ref 5–15)
AST SERPL W P-5'-P-CCNC: 51 U/L (ref 15–37)
BASOPHILS # BLD: 0 K/UL (ref 0–0.1)
BASOPHILS NFR BLD: 1 % (ref 0–1)
BILIRUB SERPL-MCNC: 0.7 MG/DL (ref 0.2–1)
BNP SERPL-MCNC: ABNORMAL PG/ML
BUN SERPL-MCNC: 15 MG/DL (ref 6–20)
BUN/CREAT SERPL: 3 (ref 12–20)
CA-I BLD-MCNC: 8.6 MG/DL (ref 8.5–10.1)
CHLORIDE SERPL-SCNC: 97 MMOL/L (ref 97–108)
CO2 SERPL-SCNC: 32 MMOL/L (ref 21–32)
CREAT SERPL-MCNC: 4.83 MG/DL (ref 0.55–1.02)
DIFFERENTIAL METHOD BLD: ABNORMAL
EOSINOPHIL # BLD: 0.2 K/UL (ref 0–0.4)
EOSINOPHIL NFR BLD: 2 % (ref 0–7)
ERYTHROCYTE [DISTWIDTH] IN BLOOD BY AUTOMATED COUNT: 17.4 % (ref 11.5–14.5)
GLOBULIN SER CALC-MCNC: 4.2 G/DL (ref 2–4)
GLUCOSE SERPL-MCNC: 85 MG/DL (ref 65–100)
HCT VFR BLD AUTO: 32.6 % (ref 35–47)
HGB BLD-MCNC: 10.1 G/DL (ref 11.5–16)
IMM GRANULOCYTES # BLD AUTO: 0 K/UL (ref 0–0.04)
IMM GRANULOCYTES NFR BLD AUTO: 0 % (ref 0–0.5)
LYMPHOCYTES # BLD: 1 K/UL (ref 0.8–3.5)
LYMPHOCYTES NFR BLD: 15 % (ref 12–49)
MCH RBC QN AUTO: 29.7 PG (ref 26–34)
MCHC RBC AUTO-ENTMCNC: 31 G/DL (ref 30–36.5)
MCV RBC AUTO: 95.9 FL (ref 80–99)
MONOCYTES # BLD: 0.6 K/UL (ref 0–1)
MONOCYTES NFR BLD: 10 % (ref 5–13)
NEUTS SEG # BLD: 4.7 K/UL (ref 1.8–8)
NEUTS SEG NFR BLD: 72 % (ref 32–75)
NRBC # BLD: 0 K/UL (ref 0–0.01)
NRBC BLD-RTO: 0 PER 100 WBC
PLATELET # BLD AUTO: 140 K/UL (ref 150–400)
POTASSIUM SERPL-SCNC: 4.5 MMOL/L (ref 3.5–5.1)
PROT SERPL-MCNC: 7.3 G/DL (ref 6.4–8.2)
RBC # BLD AUTO: 3.4 M/UL (ref 3.8–5.2)
SODIUM SERPL-SCNC: 135 MMOL/L (ref 136–145)
WBC # BLD AUTO: 6.4 K/UL (ref 3.6–11)

## 2022-03-11 PROCEDURE — 36415 COLL VENOUS BLD VENIPUNCTURE: CPT

## 2022-03-11 PROCEDURE — 71250 CT THORAX DX C-: CPT

## 2022-03-11 PROCEDURE — 74011250637 HC RX REV CODE- 250/637: Performed by: EMERGENCY MEDICINE

## 2022-03-11 PROCEDURE — 99285 EMERGENCY DEPT VISIT HI MDM: CPT

## 2022-03-11 PROCEDURE — 93005 ELECTROCARDIOGRAM TRACING: CPT

## 2022-03-11 PROCEDURE — 83880 ASSAY OF NATRIURETIC PEPTIDE: CPT

## 2022-03-11 PROCEDURE — 71046 X-RAY EXAM CHEST 2 VIEWS: CPT

## 2022-03-11 PROCEDURE — 85025 COMPLETE CBC W/AUTO DIFF WBC: CPT

## 2022-03-11 PROCEDURE — 80053 COMPREHEN METABOLIC PANEL: CPT

## 2022-03-11 RX ORDER — CODEINE PHOSPHATE AND GUAIFENESIN 10; 100 MG/5ML; MG/5ML
5 SOLUTION ORAL
Status: COMPLETED | OUTPATIENT
Start: 2022-03-11 | End: 2022-03-11

## 2022-03-11 RX ORDER — CODEINE PHOSPHATE AND GUAIFENESIN 10; 100 MG/5ML; MG/5ML
5 SOLUTION ORAL
Qty: 1 EACH | Refills: 0 | Status: SHIPPED | OUTPATIENT
Start: 2022-03-11 | End: 2022-03-14

## 2022-03-11 RX ADMIN — GUAIFENESIN AND CODEINE PHOSPHATE 5 ML: 10; 100 LIQUID ORAL at 22:56

## 2022-03-12 LAB
ATRIAL RATE: 104 BPM
CALCULATED P AXIS, ECG09: 62 DEGREES
CALCULATED R AXIS, ECG10: 28 DEGREES
CALCULATED T AXIS, ECG11: 84 DEGREES
DIAGNOSIS, 93000: NORMAL
P-R INTERVAL, ECG05: 154 MS
Q-T INTERVAL, ECG07: 400 MS
QRS DURATION, ECG06: 84 MS
QTC CALCULATION (BEZET), ECG08: 526 MS
VENTRICULAR RATE, ECG03: 104 BPM

## 2022-03-12 NOTE — ED TRIAGE NOTES
Pt had pneumonia last week and never received treatment per family members pt has productive cough and occasional SOB

## 2022-03-12 NOTE — DISCHARGE INSTRUCTIONS
Lina Nguyen was seen in the ER for her cough. This is likely due to a \"post-viral cough. \" After a viral infection like bronchitis, our lungs get irritated and make us cough even though the infection is getting better. Unfortunately, this can last for weeks. While you tested negative for COVID, if your daughter did indeed test positive, that was likely the virus that caused this cough. Thankfully, none of our imaging showed any signs of a dangerous pneumonia. We did see signs of bronchitis. We gave you a cough syrup here to treat your cough and a prescription you can  to use at home. Return to the ER for any worsening cough, difficulty breathing, fevers, or any other new or concerning symptoms. Thank you! Thank you for allowing me to care for you in the emergency department. I sincerely hope that you are satisfied with your visit today. It is my goal to provide you with excellent care. Below you will find a list of your labs and imaging from your visit today. Should you have any questions regarding these results please do not hesitate to call the emergency department. Labs -     Recent Results (from the past 12 hour(s))   CBC WITH AUTOMATED DIFF    Collection Time: 03/11/22  9:16 PM   Result Value Ref Range    WBC 6.4 3.6 - 11.0 K/uL    RBC 3.40 (L) 3.80 - 5.20 M/uL    HGB 10.1 (L) 11.5 - 16.0 g/dL    HCT 32.6 (L) 35.0 - 47.0 %    MCV 95.9 80.0 - 99.0 FL    MCH 29.7 26.0 - 34.0 PG    MCHC 31.0 30.0 - 36.5 g/dL    RDW 17.4 (H) 11.5 - 14.5 %    PLATELET 386 (L) 837 - 400 K/uL    NRBC 0.0 0.0  WBC    ABSOLUTE NRBC 0.00 0.00 - 0.01 K/uL    NEUTROPHILS 72 32 - 75 %    LYMPHOCYTES 15 12 - 49 %    MONOCYTES 10 5 - 13 %    EOSINOPHILS 2 0 - 7 %    BASOPHILS 1 0 - 1 %    IMMATURE GRANULOCYTES 0 0 - 0.5 %    ABS. NEUTROPHILS 4.7 1.8 - 8.0 K/UL    ABS. LYMPHOCYTES 1.0 0.8 - 3.5 K/UL    ABS. MONOCYTES 0.6 0.0 - 1.0 K/UL    ABS. EOSINOPHILS 0.2 0.0 - 0.4 K/UL    ABS. BASOPHILS 0.0 0.0 - 0.1 K/UL    ABS. IMM. GRANS. 0.0 0.00 - 0.04 K/UL    DF AUTOMATED     METABOLIC PANEL, COMPREHENSIVE    Collection Time: 03/11/22  9:16 PM   Result Value Ref Range    Sodium 135 (L) 136 - 145 mmol/L    Potassium 4.5 3.5 - 5.1 mmol/L    Chloride 97 97 - 108 mmol/L    CO2 32 21 - 32 mmol/L    Anion gap 6 5 - 15 mmol/L    Glucose 85 65 - 100 mg/dL    BUN 15 6 - 20 mg/dL    Creatinine 4.83 (H) 0.55 - 1.02 mg/dL    BUN/Creatinine ratio 3 (L) 12 - 20      GFR est AA 11 (L) >60 ml/min/1.73m2    GFR est non-AA 9 (L) >60 ml/min/1.73m2    Calcium 8.6 8.5 - 10.1 mg/dL    Bilirubin, total 0.7 0.2 - 1.0 mg/dL    AST (SGOT) 51 (H) 15 - 37 U/L    ALT (SGPT) 43 12 - 78 U/L    Alk. phosphatase 186 (H) 45 - 117 U/L    Protein, total 7.3 6.4 - 8.2 g/dL    Albumin 3.1 (L) 3.5 - 5.0 g/dL    Globulin 4.2 (H) 2.0 - 4.0 g/dL    A-G Ratio 0.7 (L) 1.1 - 2.2     NT-PRO BNP    Collection Time: 03/11/22  9:16 PM   Result Value Ref Range    NT pro-BNP >35,000 (H) <125 pg/mL       Radiologic Studies -   CT CHEST WO CONT   Final Result   Bilateral pleural effusions, right greater than left in size. Patchy atelectasis   throughout the mid to lower lung zones. Examination negative for consolidative   pneumonia. XR CHEST PA LAT   Final Result   FINDINGS/IMPRESSION:   Lungs mildly hypoinflated, negative for consolidative pneumonia. Left chest wall ICD. Vascular stents noted. Cardiac silhouette enlarged, mild   hydrostatic edema. Bilateral shoulder. Senescent changes are present in the thoracic spine. CT Results  (Last 48 hours)                 03/11/22 2137  CT CHEST WO CONT Final result    Impression:  Bilateral pleural effusions, right greater than left in size. Patchy atelectasis   throughout the mid to lower lung zones. Examination negative for consolidative   pneumonia.            Narrative:  TECHNIQUE: Multiple continuous axial images were obtained from the thoracic   inlet to the upper abdomen without administration of intravenous contrast. Sagittal and coronal reformatted images, as well as axial maximum intensity   projection images were generated. Comment on dose reduction: All CT scans at this facility are performed using   dose reduction optimization technique as appropriate to perform the exam   including the following; automated exposure control, adjustments of the mA   and/or kV according to patient size, or use of iterative reconstructed   technique. Comparison examination: Chest dated earlier same day       Findings:   Examination limited secondary to lack of intravenous contrast.       LYMPHADENOPATHY: There is no axillary, mediastinal, or hilar lymphadenopathy by   CT size criteria. CARDIOVASCULAR: Left chest wall ICD. Median sternotomy. Cardiomegaly. Three-vessel coronary artery calcifications are present. Calcified intimal   plaque is present in the thoracic aorta and branch vessels. Thoracic aorta   normal in caliber. There is evidence of pulmonary arterial hypertension   illustrated by the main pulmonary artery measuring 38 mm in cross section. LUNGS AND PLEURA: Bilateral pleural effusions, right greater than left in size. Patchy atelectasis throughout the mid to lower lung zones. Examination negative   for consolidative pneumonia. INCLUDED ABDOMEN: Intimal calcification present in the included abdominal aorta. There is cholelithiasis without evidence of acute cholecystitis. CHEST WALL: Degenerative changes are present throughout the thoracic spine. The   included skeleton is fairly sclerotic, this may represent sequela of renal   osteodystrophy. CXR Results  (Last 48 hours)                 03/11/22 2054  XR CHEST PA LAT Final result    Impression:  FINDINGS/IMPRESSION:   Lungs mildly hypoinflated, negative for consolidative pneumonia. Left chest wall ICD. Vascular stents noted. Cardiac silhouette enlarged, mild   hydrostatic edema. Bilateral shoulder.  Senescent changes are present in the thoracic spine. Narrative:  XR CHEST PA LAT       Comparison: Chest radiograph dated August 8, 2018                      If you feel that you have not received excellent quality care or timely care, please ask to speak to the nurse manager. Please choose us in the future for your continued health care needs. ------------------------------------------------------------------------------------------------------------  The exam and treatment you received in the Emergency Department were for an urgent problem and are not intended as complete care. It is important that you follow-up with a doctor, nurse practitioner, or physician assistant to:  (1) confirm your diagnosis,  (2) re-evaluation of changes in your illness and treatment, and  (3) for ongoing care. If your symptoms become worse or you do not improve as expected and you are unable to reach your usual health care provider, you should return to the Emergency Department. We are available 24 hours a day. Please take your discharge instructions with you when you go to your follow-up appointment. If you have any problem arranging a follow-up appointment, contact the Emergency Department immediately. If a prescription has been provided, please have it filled as soon as possible to prevent a delay in treatment. Read the entire medication instruction sheet provided to you by the pharmacy. If you have any questions or reservations about taking the medication due to side effects or interactions with other medications, please call your primary care physician or contact the ER to speak with the charge nurse. Make an appointment with your family doctor or the physician you were referred to for follow-up of this visit as instructed on your discharge paperwork, as this is a mandatory follow-up. Return to the ER if you are unable to be seen or if you are unable to be seen in a timely manner.     If you have any problem arranging the follow-up visit, contact the Emergency Department immediately.

## 2022-03-12 NOTE — ED PROVIDER NOTES
EMERGENCY DEPARTMENT HISTORY AND PHYSICAL EXAM      Date: 3/11/2022  Patient Name: Elisabeth Melgar      History of Presenting Illness     Chief Complaint   Patient presents with    Cough       History Provided By: Patient, Patient's  and Patient's Daughter    HPI: Elisabeth Melgar, 75 y.o. female with a past medical history significant for CHF, ESRD on HD MWF, IDDM2, blindness presents to the ED with cc of cough. Reportedly had pneumonia diagnosed at PCP's office 1wk ago but never got treatment/abx. Daughter had Brendan few weeks ago while living with her but pt tested negative 3/2/2022. Has had persistent cough w/phlegm, unable to describe color 2/2 blindness. Endorsing SOB and weakness. Denies F/C/N/V/D, CP.  takes care of her at home, states she is more weak and he is having to help her. Feels like he can take care of her relatively safely, but daughter feels she is unsafe. Had full HD session today. There are no other complaints, changes, or physical findings at this time. PCP: Nacho Cody MD    Current Outpatient Medications   Medication Sig Dispense Refill    Insulin Needles, Disposable, (Rupali Pen Needle) 32 gauge x 5/32\" ndle USE ONCE DAILY 100 Pen Needle 3    furosemide (LASIX) 20 mg tablet Take 20 mg by mouth daily. flash glucose scanning reader (FreeStyle Chance 2 [place]) misc To use it for checking sugars 1 Each 0    flash glucose sensor (FreeStyle Chance 2 Sensor) kit One for every 14 days 2 Kit 5    rosuvastatin (CRESTOR) 20 mg tablet TAKE 1 TABLET NIGHTLY (STOP 40 MG DOSE) 90 Tablet 3    amLODIPine (NORVASC) 10 mg tablet Take 10 mg by mouth daily. clopidogreL (PLAVIX) 75 mg tab Take 75 mg by mouth daily. sacubitriL-valsartan (Entresto) 49-51 mg tab tablet Take 1 Tab by mouth two (2) times a day. insulin glargine (Lantus Solostar U-100 Insulin) 100 unit/mL (3 mL) inpn Inject 8 units daily.  15 mL 3    OTHER Liquid Protein      hydrALAZINE (APRESOLINE) 25 mg tablet Take 25 mg by mouth three (3) times daily. 1    sevelamer carbonate (RENVELA) 800 mg tab tab Take  by mouth three (3) times daily. FREESTYLE LITE STRIPS strip TEST FOUR TIMES DAILY 200 Strip 0    pantoprazole (PROTONIX) 40 mg tablet       FREESTYLE LANCETS 28 gauge misc       acetaminophen (TYLENOL EXTRA STRENGTH) 500 mg tablet Take  by mouth every six (6) hours as needed for Pain. Lancets (FREESTYLE LANCETS) misc Test 4 times daily. Dx code E11.65 200 Each 6    docusate sodium (COLACE) 100 mg capsule Take 100 mg by mouth as needed for Constipation. carvedilol (COREG) 25 mg tablet Take 25 mg by mouth two (2) times daily (with meals). ferrous sulfate 325 mg (65 mg iron) tablet Take 325 mg by mouth as needed. Past History     Past Medical History:  Past Medical History:   Diagnosis Date    Blind     CKD (chronic kidney disease)     HTN (hypertension)     Retinopathy     Type II or unspecified type diabetes mellitus with renal manifestations, uncontrolled(250.42) (HCC)        Past Surgical History:  Past Surgical History:   Procedure Laterality Date    HX CORONARY ARTERY BYPASS GRAFT      HX GYN      hysterectomy        Family History:  Family History   Problem Relation Age of Onset    Diabetes Maternal Aunt     Diabetes Maternal Grandmother        Social History:  Social History     Tobacco Use    Smoking status: Never Smoker    Smokeless tobacco: Never Used   Substance Use Topics    Alcohol use: No    Drug use: Never       Allergies:  No Known Allergies      Review of Systems   Constitutional: Negative except as in HPI. Eyes: Negative except as in HPI.  ENT: Negative except as in HPI. Cardiovascular: Negative except as in HPI. Respiratory: Negative except as in HPI. Gastrointestinal: Negative except as in HPI. Genitourinary: Negative except as in HPI. Musculoskeletal: Negative except as in HPI. Integumentary: Negative except as in HPI.   Neurological: Negative except as in HPI.  Psychiatric: Negative except as in HPI. Endocrine: Negative except as in HPI. Hematologic/Lymphatic: Negative except as in HPI. Allergic/Immunologic: Negative except as in HPI. Physical Exam   Constitutional: Awake and alert, interactive, NAD  Eyes: PERRL, no injection or scleral icterus, no discharge  HEENT: NCAT, neck supple, MMM, no oropharyngeal exudate  CV: Slightly tachycardic, RR, no m/r/g  Respiratory: CTAB, no r/r/w, conversational dyspnea  GI: Abd soft, nondistended, nontender  : Deferred  MSK: FROM, no joint effusions or edema  Skin: No rashes  Neuro: Symmetric facies, fluent speech. Psych: Well-groomed, normal speech, behavior, appropriate mood\    Lab and Diagnostic Study Results     Labs -   No results found for this or any previous visit (from the past 12 hour(s)). Radiologic Studies -   [unfilled]  CT Results  (Last 48 hours)      None          CXR Results  (Last 48 hours)                 03/11/22 2054  XR CHEST PA LAT Final result    Impression:  FINDINGS/IMPRESSION:   Lungs mildly hypoinflated, negative for consolidative pneumonia. Left chest wall ICD. Vascular stents noted. Cardiac silhouette enlarged, mild   hydrostatic edema. Bilateral shoulder. Senescent changes are present in the thoracic spine. Narrative:  XR CHEST PA LAT       Comparison: Chest radiograph dated August 8, 2018                   Medical Decision Making and ED Course   - I am the first and primary provider for this patient AND AM THE PRIMARY PROVIDER OF RECORD. - I reviewed the vital signs, available nursing notes, past medical history, past surgical history, family history and social history. - Initial assessment performed. The patients presenting problems have been discussed, and the staff are in agreement with the care plan formulated and outlined with them. I have encouraged them to ask questions as they arise throughout their visit.     Vital Signs-Reviewed the patient's vital signs. Patient Vitals for the past 12 hrs:   Temp Pulse Resp BP SpO2   03/11/22 2029 98.1 °F (36.7 °C) (!) 105 18 (!) 165/82 100 %       EKG interpretation:         Provider Notes (Medical Decision Making):   67F w/cough, SOB. Will get CXR, possibly CT Chest to eval for PNA. Discussed possible dispo with patient and family. Less willing to go to rehab for strengthening but does think she might benefit from admission. Will discuss further with patient pending results. ED Course:       ED Course as of 03/11/22 2309   Fri Mar 11, 2022   2126 XR CHEST PA LAT       IMPRESSION  FINDINGS/IMPRESSION:  Lungs mildly hypoinflated, negative for consolidative pneumonia. Left chest wall ICD. Vascular stents noted. Cardiac silhouette enlarged, mild  hydrostatic edema. Bilateral shoulder. Senescent changes are present in the thoracic spine. [YA]   3289 CT CHEST WO CONT    IMPRESSION  Bilateral pleural effusions, right greater than left in size. Patchy atelectasis  throughout the mid to lower lung zones. Examination negative for consolidative  pneumonia. [YA]   3404 Not c/w PNA, possibly bronchitis. Pending BMP will discuss dispo with patient. [YA]   6162 Potassium: 4.5 [YA]   2248 Creatinine(!): 4.83  ESRD on HD [YA]   2252 Discussed with patient and  Pineda Mariee, will give codeine here and for home. Likely post-viral cough 2/2 prior COVID or other viral bilateral PNA. Return precautions discussed. [YA]      ED Course User Index  [YA] Joaquin Cooney MD         Disposition     Disposition: DC- Adult Discharges: All of the diagnostic tests were reviewed and questions answered. Diagnosis, care plan and treatment options were discussed. The patient understands the instructions and will follow up as directed. The patients results have been reviewed with them. They have been counseled regarding their diagnosis.   The patient verbally convey understanding and agreement of the signs, symptoms, diagnosis, treatment and prognosis and additionally agrees to follow up as recommended with their PCP in 24 - 48 hours. They also agree with the care-plan and convey that all of their questions have been answered. I have also put together some discharge instructions for them that include: 1) educational information regarding their diagnosis, 2) how to care for their diagnosis at home, as well a 3) list of reasons why they would want to return to the ED prior to their follow-up appointment, should their condition change. Discharged      Diagnosis     Clinical Impression:   1. Cough        Attestations:     Jennifer Pereira MD

## 2022-03-19 PROBLEM — E78.2 MIXED HYPERLIPIDEMIA: Status: ACTIVE | Noted: 2018-09-13

## 2022-04-21 ENCOUNTER — OFFICE VISIT (OUTPATIENT)
Dept: ENDOCRINOLOGY | Age: 67
End: 2022-04-21
Payer: MEDICARE

## 2022-04-21 VITALS
HEIGHT: 66 IN | OXYGEN SATURATION: 94 % | HEART RATE: 83 BPM | RESPIRATION RATE: 24 BRPM | WEIGHT: 152 LBS | TEMPERATURE: 98.2 F | BODY MASS INDEX: 24.43 KG/M2 | DIASTOLIC BLOOD PRESSURE: 80 MMHG | SYSTOLIC BLOOD PRESSURE: 149 MMHG

## 2022-04-21 DIAGNOSIS — H35.00 RETINOPATHY: ICD-10-CM

## 2022-04-21 DIAGNOSIS — N18.6 ESRD ON HEMODIALYSIS (HCC): ICD-10-CM

## 2022-04-21 DIAGNOSIS — E11.22 TYPE 2 DIABETES MELLITUS WITH CHRONIC KIDNEY DISEASE ON CHRONIC DIALYSIS, WITH LONG-TERM CURRENT USE OF INSULIN (HCC): Primary | ICD-10-CM

## 2022-04-21 DIAGNOSIS — Z99.2 ESRD ON HEMODIALYSIS (HCC): ICD-10-CM

## 2022-04-21 DIAGNOSIS — Z99.2 TYPE 2 DIABETES MELLITUS WITH CHRONIC KIDNEY DISEASE ON CHRONIC DIALYSIS, WITH LONG-TERM CURRENT USE OF INSULIN (HCC): Primary | ICD-10-CM

## 2022-04-21 DIAGNOSIS — N18.6 TYPE 2 DIABETES MELLITUS WITH CHRONIC KIDNEY DISEASE ON CHRONIC DIALYSIS, WITH LONG-TERM CURRENT USE OF INSULIN (HCC): Primary | ICD-10-CM

## 2022-04-21 DIAGNOSIS — H54.8 LEGALLY BLIND: ICD-10-CM

## 2022-04-21 DIAGNOSIS — Z79.4 TYPE 2 DIABETES MELLITUS WITH CHRONIC KIDNEY DISEASE ON CHRONIC DIALYSIS, WITH LONG-TERM CURRENT USE OF INSULIN (HCC): Primary | ICD-10-CM

## 2022-04-21 DIAGNOSIS — N25.0 RENAL OSTEODYSTROPHY: ICD-10-CM

## 2022-04-21 LAB — HBA1C MFR BLD HPLC: 5.1 %

## 2022-04-21 PROCEDURE — 83036 HEMOGLOBIN GLYCOSYLATED A1C: CPT | Performed by: INTERNAL MEDICINE

## 2022-04-21 PROCEDURE — 1090F PRES/ABSN URINE INCON ASSESS: CPT | Performed by: INTERNAL MEDICINE

## 2022-04-21 PROCEDURE — 1101F PT FALLS ASSESS-DOCD LE1/YR: CPT | Performed by: INTERNAL MEDICINE

## 2022-04-21 PROCEDURE — 99214 OFFICE O/P EST MOD 30 MIN: CPT | Performed by: INTERNAL MEDICINE

## 2022-04-21 PROCEDURE — G8427 DOCREV CUR MEDS BY ELIG CLIN: HCPCS | Performed by: INTERNAL MEDICINE

## 2022-04-21 PROCEDURE — G8536 NO DOC ELDER MAL SCRN: HCPCS | Performed by: INTERNAL MEDICINE

## 2022-04-21 PROCEDURE — G8510 SCR DEP NEG, NO PLAN REQD: HCPCS | Performed by: INTERNAL MEDICINE

## 2022-04-21 PROCEDURE — 3044F HG A1C LEVEL LT 7.0%: CPT | Performed by: INTERNAL MEDICINE

## 2022-04-21 PROCEDURE — G8400 PT W/DXA NO RESULTS DOC: HCPCS | Performed by: INTERNAL MEDICINE

## 2022-04-21 PROCEDURE — 2022F DILAT RTA XM EVC RTNOPTHY: CPT | Performed by: INTERNAL MEDICINE

## 2022-04-21 PROCEDURE — G8420 CALC BMI NORM PARAMETERS: HCPCS | Performed by: INTERNAL MEDICINE

## 2022-04-21 PROCEDURE — G9231 DOC ESRD DIA TRANS PREG: HCPCS | Performed by: INTERNAL MEDICINE

## 2022-04-21 PROCEDURE — 3017F COLORECTAL CA SCREEN DOC REV: CPT | Performed by: INTERNAL MEDICINE

## 2022-04-21 RX ORDER — DICLOFENAC SODIUM 10 MG/G
GEL TOPICAL
COMMUNITY
Start: 2021-09-21

## 2022-04-21 RX ORDER — REPAGLINIDE 1 MG/1
TABLET ORAL
COMMUNITY
End: 2022-04-21 | Stop reason: ALTCHOICE

## 2022-04-21 RX ORDER — CINACALCET 30 MG/1
TABLET, FILM COATED ORAL
COMMUNITY
End: 2022-04-21 | Stop reason: ALTCHOICE

## 2022-04-21 RX ORDER — MELATONIN
2000 DAILY
COMMUNITY

## 2022-04-21 RX ORDER — ISOSORBIDE MONONITRATE 60 MG/1
TABLET, EXTENDED RELEASE ORAL
COMMUNITY
End: 2022-04-21

## 2022-04-21 NOTE — PATIENT INSTRUCTIONS
SPECIFIC INSTRUCTIONS BELOW       Stay  on rosuvostatin  20 mg at night       Check blood sugars immediately before each meal and at bedtime     Lantus  Insulin  8   units  In the morning     Stopping repaglinide ( supposed to be on 0.5 mg before meals )          Less than 70 mg NO INSULIN        Do not skip meals  Do not eat in between meals    Reduce carbs- pasta, rice, potatoes, bread   Do not drink juices or sodas  Donot eat peanut butter , biscuits     Do not eat sugar free cookies and cakes   Do not eat peaches, grapes , oranges and pineapples                -------------PAY ATTENTION TO THESE GENERAL INSTRUCTIONS -----------------      - The medications prescribed at this visit will not be available at pharmacy until 6 pm       - YOUR MED LIST IS NOT UP TO DATE AS SOME CHANGES ARE BEING MADE AFTER THE VISIT - FOLLOW SPECIFIC INSTRUCTIONS  ABOVE     -ANY tests other than blood work, which you opt to do  outside the  [facility] imaging facilities, you are responsible for prior authorizations if  required    - [address] UP TO DATE ON YOUR AVS- PLEASE IGNORE     Results     *Normal results will not be notified by a phone call starting January 1 2021   *If you have an upcoming visit, the results will be discussed at the visit   *Please sign up for MY CHART if you want access to your lab and test results  *Abnormal results which require immediate attention will be notified by phone call   *Abnormal results which do not require immediate assistance will be notified in 1-2 weeks       Refills    -    have your pharmacy send us a refill request . Refills are done max for one year and a visit is a must before refills are extended    Follow up appointments -  highly encourage you to make it when you are checking out. We can accommodate you into the schedule based on your clinical situation, but not for extending refills beyond a year.  Labs are important to give refills and is important to get labs before the visit     Phone calls  -  Allow  24 hrs.  for non-urgent calls to be returned  Prior authorization - It may take 2-4 weeks to process  Forms  -  FMLA, DMV etc., will take up to 2 weeks to process  Cancellations - please notify the office 2 days in advance   Samples  - will only be dispensed at visits       If not showing for the appointments and cancelling appointments within 24 hours are kept track of and three  of such situations in  two consecutive years will likely be considered for termination from the practice    -------------------------------------------------------------------------------------------------------------------

## 2022-04-21 NOTE — PROGRESS NOTES
HISTORY OF PRESENT ILLNESS  Shelvy Jeans is a 75 y.o. female. HPI  Patient here for  F/u after last visit of Type 2 diabetes mellitus  From October 2021 October 2021     Patient Is accompanied by  Her    She is keen on getting CGM  Dr Koki Fritz performed DEXA and requested for plan of care       April 2021     She had a low sugar per family and grand daughter felt the need to check sugars   Pt has no meter    Daughter is eager to get mother CGM     Old history   Referred : byp, Neil Hammond   H/o diabetes for 10  years  Current A1C is 9 %  and symptoms/problems include fatigue   Current diabetic medications include Lantus 45 at night , tradjenta 5 mg ( non compliant )      Review of Systems   Constitutional: Positive for malaise/fatigue   Eyes:  legally blind  Psychiatric/Behavioral: Negative for depression and memory loss. The patient does not have insomnia. Physical Exam   Constitutional: She is oriented to person, place, and time. She appears well-developed and well-nourished. Eyes: Legally blind   Psychiatric: She has a normal mood and affect. ASSESSMENT and PLAN    1.  Type 2 DM uncontrolled  On hemodialysis : A1c is    5.1 %      From   Oct 2021  -    By POC      Compared to 6.5 %     From     August 2020 ,   5.8 %     From    Today dec 2019  By POC   Compared to    6.5 %   FROM MAY 2019   COMPARED TO    6.6 %      From sept 2018    Compared to   8.5  %     From    Today march 2018    Compared to  5.3 %       From   Today      May 2017          She has several diabetic complications retinopathy and nephropathy    gladly is  Supervising  Her , but he works part time since on HD   Pt can benefit from CGMS/dexcom given that she is blind and dependant on HD  There is no   METER  Information TO KNOW THE GLYCEMIC EXCURSIONS   She had a few  LOW SUGARS  In the recent past     She is given the information for  supply companies - for dexcom  Continue Lantus 8 units a day in the morning times  prandin  At 1 mg right before meals  ( Not sure if she is compliant )    Patient is advised to check blood sugars 3-4  times daily . reviewed medications and side effects in detail  lab results and schedule of future lab studies reviewed with patient        2. Hypoglycemia : At high risk because of blindness, being on insulin, dependant on others, and inability to check more often    Educated on treating the hypoglycemia and again admonished to take care of herself       3. HTN : un controlled with improved compliance since the long discussion with her    continue imdur,  Coreg and hydralazine ,  entresto  . 4. Dyslipidemia : continue crestor 20 .       5. Diabetic complications :     A. Retinopathy-YES  Legally blind   Dependant on her  for medications and care        B. Nephropathy - YES   On hemodialysis   F/u  With  Dr. Debbi He. Peripheral Neuropathy - YES  , mild  educated on this disease and indicated improvement with good and stable glycemic control    D.  PAD : YES         E : CAD : YES, f/u with Dr. Ethel Stringer  Had CABG, and has PCM      6.  Osteoporosis / Renal osteodystrophy    It is hard to differentiate    Date : sept 30 2021   Bone DEXA  ap spine T-score 0.6; left femoral Neck T- score -2.8, right femoral neck T-score n/a    Because of being on dialysis, there is a risk for hypocalcemia - going onto  Prolia   No other meds are approved for this condition     It is good if the bones are managed by calcium and phos balance in the blood       Reviewed results with patient and discussed the labs being ordered today/bnv  Patient voiced understanding of plan of care         F/u in 6 months

## 2022-04-21 NOTE — LETTER
4/24/2022    Patient: Loulou Gaytan   YOB: 1955   Date of Visit: 4/21/2022     Elton Page MD  [facility] [address] [address]  Via Fax: [phone]    Dear Elton Page MD,      Thank you for referring Ms. Madhu Hand to [address] [place] [address] for evaluation. My notes for this consultation are attached. If you have questions, please do not hesitate to call me. I look forward to following your patient along with you.       Sincerely,    Winter Anglin MD

## 2022-07-27 ENCOUNTER — HOSPITAL ENCOUNTER (INPATIENT)
Age: 67
LOS: 3 days | Discharge: HOME | DRG: 640 | End: 2022-07-31
Attending: EMERGENCY MEDICINE | Admitting: INTERNAL MEDICINE
Payer: MEDICARE

## 2022-07-27 ENCOUNTER — APPOINTMENT (OUTPATIENT)
Dept: GENERAL RADIOLOGY | Age: 67
DRG: 640 | End: 2022-07-27
Attending: EMERGENCY MEDICINE
Payer: MEDICARE

## 2022-07-27 DIAGNOSIS — R07.9 CHEST PAIN, UNSPECIFIED TYPE: ICD-10-CM

## 2022-07-27 DIAGNOSIS — E87.5 ACUTE HYPERKALEMIA: Primary | ICD-10-CM

## 2022-07-27 LAB
ALBUMIN SERPL-MCNC: 3.7 G/DL (ref 3.5–5)
ALBUMIN SERPL-MCNC: 3.8 G/DL (ref 3.5–5)
ALBUMIN/GLOB SERPL: 1.1 {RATIO} (ref 1.1–2.2)
ALBUMIN/GLOB SERPL: 1.1 {RATIO} (ref 1.1–2.2)
ALP SERPL-CCNC: 114 U/L (ref 45–117)
ALP SERPL-CCNC: 121 U/L (ref 45–117)
ALT SERPL-CCNC: 25 U/L (ref 12–78)
ALT SERPL-CCNC: 26 U/L (ref 12–78)
ANION GAP SERPL CALC-SCNC: 13 MMOL/L (ref 5–15)
ANION GAP SERPL CALC-SCNC: 14 MMOL/L (ref 5–15)
AST SERPL W P-5'-P-CCNC: 20 U/L (ref 15–37)
AST SERPL W P-5'-P-CCNC: 21 U/L (ref 15–37)
BASOPHILS # BLD: 0.1 K/UL (ref 0–0.1)
BASOPHILS NFR BLD: 1 % (ref 0–1)
BILIRUB DIRECT SERPL-MCNC: 0.3 MG/DL (ref 0–0.2)
BILIRUB SERPL-MCNC: 0.7 MG/DL (ref 0.2–1)
BILIRUB SERPL-MCNC: 0.7 MG/DL (ref 0.2–1)
BNP SERPL-MCNC: ABNORMAL PG/ML
BUN SERPL-MCNC: 91 MG/DL (ref 6–20)
BUN SERPL-MCNC: 92 MG/DL (ref 6–20)
BUN/CREAT SERPL: 6 (ref 12–20)
BUN/CREAT SERPL: 6 (ref 12–20)
CA-I BLD-MCNC: 10.2 MG/DL (ref 8.5–10.1)
CA-I BLD-MCNC: 9.8 MG/DL (ref 8.5–10.1)
CHLORIDE SERPL-SCNC: 100 MMOL/L (ref 97–108)
CHLORIDE SERPL-SCNC: 103 MMOL/L (ref 97–108)
CO2 SERPL-SCNC: 20 MMOL/L (ref 21–32)
CO2 SERPL-SCNC: 23 MMOL/L (ref 21–32)
CREAT SERPL-MCNC: 14.4 MG/DL (ref 0.55–1.02)
CREAT SERPL-MCNC: 14.6 MG/DL (ref 0.55–1.02)
DIFFERENTIAL METHOD BLD: ABNORMAL
EOSINOPHIL # BLD: 0.1 K/UL (ref 0–0.4)
EOSINOPHIL NFR BLD: 2 % (ref 0–7)
ERYTHROCYTE [DISTWIDTH] IN BLOOD BY AUTOMATED COUNT: 17 % (ref 11.5–14.5)
GLOBULIN SER CALC-MCNC: 3.4 G/DL (ref 2–4)
GLOBULIN SER CALC-MCNC: 3.4 G/DL (ref 2–4)
GLUCOSE SERPL-MCNC: 73 MG/DL (ref 65–100)
GLUCOSE SERPL-MCNC: 76 MG/DL (ref 65–100)
HCT VFR BLD AUTO: 28.6 % (ref 35–47)
HGB BLD-MCNC: 9.2 G/DL (ref 11.5–16)
IMM GRANULOCYTES # BLD AUTO: 0 K/UL (ref 0–0.04)
IMM GRANULOCYTES NFR BLD AUTO: 0 % (ref 0–0.5)
LIPASE SERPL-CCNC: 97 U/L (ref 73–393)
LYMPHOCYTES # BLD: 0.8 K/UL (ref 0.8–3.5)
LYMPHOCYTES NFR BLD: 12 % (ref 12–49)
MCH RBC QN AUTO: 32.2 PG (ref 26–34)
MCHC RBC AUTO-ENTMCNC: 32.2 G/DL (ref 30–36.5)
MCV RBC AUTO: 100 FL (ref 80–99)
MONOCYTES # BLD: 0.4 K/UL (ref 0–1)
MONOCYTES NFR BLD: 7 % (ref 5–13)
NEUTS SEG # BLD: 5.4 K/UL (ref 1.8–8)
NEUTS SEG NFR BLD: 78 % (ref 32–75)
NRBC # BLD: 0 K/UL (ref 0–0.01)
NRBC BLD-RTO: 0 PER 100 WBC
PLATELET # BLD AUTO: 138 K/UL (ref 150–400)
PMV BLD AUTO: 13.5 FL (ref 8.9–12.9)
POTASSIUM SERPL-SCNC: 6.4 MMOL/L (ref 3.5–5.1)
POTASSIUM SERPL-SCNC: 7.2 MMOL/L (ref 3.5–5.1)
POTASSIUM SERPL-SCNC: 7.2 MMOL/L (ref 3.5–5.1)
PROT SERPL-MCNC: 7.1 G/DL (ref 6.4–8.2)
PROT SERPL-MCNC: 7.2 G/DL (ref 6.4–8.2)
RBC # BLD AUTO: 2.86 M/UL (ref 3.8–5.2)
SODIUM SERPL-SCNC: 134 MMOL/L (ref 136–145)
SODIUM SERPL-SCNC: 139 MMOL/L (ref 136–145)
TROPONIN-HIGH SENSITIVITY: 88 NG/L (ref 0–51)
WBC # BLD AUTO: 6.8 K/UL (ref 3.6–11)

## 2022-07-27 PROCEDURE — 84484 ASSAY OF TROPONIN QUANT: CPT

## 2022-07-27 PROCEDURE — 84132 ASSAY OF SERUM POTASSIUM: CPT

## 2022-07-27 PROCEDURE — 36415 COLL VENOUS BLD VENIPUNCTURE: CPT

## 2022-07-27 PROCEDURE — 74011250636 HC RX REV CODE- 250/636: Performed by: EMERGENCY MEDICINE

## 2022-07-27 PROCEDURE — 96376 TX/PRO/DX INJ SAME DRUG ADON: CPT

## 2022-07-27 PROCEDURE — 96375 TX/PRO/DX INJ NEW DRUG ADDON: CPT

## 2022-07-27 PROCEDURE — 85025 COMPLETE CBC W/AUTO DIFF WBC: CPT

## 2022-07-27 PROCEDURE — 96368 THER/DIAG CONCURRENT INF: CPT

## 2022-07-27 PROCEDURE — 93005 ELECTROCARDIOGRAM TRACING: CPT

## 2022-07-27 PROCEDURE — 80053 COMPREHEN METABOLIC PANEL: CPT

## 2022-07-27 PROCEDURE — 99285 EMERGENCY DEPT VISIT HI MDM: CPT

## 2022-07-27 PROCEDURE — 74011000250 HC RX REV CODE- 250: Performed by: EMERGENCY MEDICINE

## 2022-07-27 PROCEDURE — 83880 ASSAY OF NATRIURETIC PEPTIDE: CPT

## 2022-07-27 PROCEDURE — 74011636637 HC RX REV CODE- 636/637: Performed by: EMERGENCY MEDICINE

## 2022-07-27 PROCEDURE — 80076 HEPATIC FUNCTION PANEL: CPT

## 2022-07-27 PROCEDURE — 83690 ASSAY OF LIPASE: CPT

## 2022-07-27 PROCEDURE — 74011250637 HC RX REV CODE- 250/637: Performed by: EMERGENCY MEDICINE

## 2022-07-27 PROCEDURE — 96365 THER/PROPH/DIAG IV INF INIT: CPT

## 2022-07-27 PROCEDURE — 71045 X-RAY EXAM CHEST 1 VIEW: CPT

## 2022-07-27 RX ORDER — CALCIUM GLUCONATE 94 MG/ML
1 INJECTION, SOLUTION INTRAVENOUS
Status: DISCONTINUED | OUTPATIENT
Start: 2022-07-27 | End: 2022-07-27

## 2022-07-27 RX ORDER — SODIUM POLYSTYRENE SULFONATE 15 G/60ML
30 SUSPENSION ORAL; RECTAL
Status: COMPLETED | OUTPATIENT
Start: 2022-07-27 | End: 2022-07-27

## 2022-07-27 RX ORDER — CALCIUM GLUCONATE 20 MG/ML
1 INJECTION, SOLUTION INTRAVENOUS ONCE
Status: DISCONTINUED | OUTPATIENT
Start: 2022-07-27 | End: 2022-07-27 | Stop reason: SDUPTHER

## 2022-07-27 RX ORDER — CALCIUM GLUCONATE 20 MG/ML
1 INJECTION, SOLUTION INTRAVENOUS ONCE
Status: COMPLETED | OUTPATIENT
Start: 2022-07-27 | End: 2022-07-27

## 2022-07-27 RX ORDER — SODIUM BICARBONATE 1 MEQ/ML
50 SYRINGE (ML) INTRAVENOUS ONCE
Status: COMPLETED | OUTPATIENT
Start: 2022-07-27 | End: 2022-07-27

## 2022-07-27 RX ORDER — SODIUM POLYSTYRENE SULFONATE 15 G/60ML
30 SUSPENSION ORAL; RECTAL
Status: DISCONTINUED | OUTPATIENT
Start: 2022-07-27 | End: 2022-07-27

## 2022-07-27 RX ADMIN — DEXTROSE MONOHYDRATE 250 ML: 100 INJECTION, SOLUTION INTRAVENOUS at 18:43

## 2022-07-27 RX ADMIN — SODIUM POLYSTYRENE SULFONATE 30 G: 15 SUSPENSION ORAL; RECTAL at 21:29

## 2022-07-27 RX ADMIN — INSULIN HUMAN 10 UNITS: 100 INJECTION, SOLUTION PARENTERAL at 18:44

## 2022-07-27 RX ADMIN — CALCIUM GLUCONATE 1 G: 98 INJECTION, SOLUTION INTRAVENOUS at 17:56

## 2022-07-27 RX ADMIN — SODIUM BICARBONATE 50 MEQ: 84 INJECTION INTRAVENOUS at 18:43

## 2022-07-27 RX ADMIN — CALCIUM GLUCONATE 1000 MG: 20 INJECTION, SOLUTION INTRAVENOUS at 18:43

## 2022-07-27 RX ADMIN — SODIUM POLYSTYRENE SULFONATE 30 G: 15 SUSPENSION ORAL; RECTAL at 19:23

## 2022-07-27 RX ADMIN — SODIUM BICARBONATE 50 MEQ: 84 INJECTION, SOLUTION INTRAVENOUS at 19:22

## 2022-07-27 NOTE — ED PROVIDER NOTES
EMERGENCY DEPARTMENT HISTORY AND PHYSICAL EXAM      Date: 7/27/2022  Patient Name: Fermín Michel    History of Presenting Illness     Chief Complaint   Patient presents with    Chest Pain       History Provided By: Patient    HPI: Fermín Michel, 75 y.o. female with history of CKD on hemodialysis, DM, and hypertension who presents with chest pain, abdominal pain, and achiness in her legs x2 days. States that pain is improving. Pain is in the center of her chest, nonradiating, sharp. Not makes pain better or worse. States that she did miss her hemodialysis today since she has not been feeling well. There are no other complaints, changes, or physical findings at this time. PCP: Kady Crow MD    Current Outpatient Medications   Medication Sig Dispense Refill    diclofenac (Voltaren Arthritis Pain) 1 % gel apply to foot and hands as needed for arthritis pain      cholecalciferol (Vitamin D3) (1000 Units /25 mcg) tablet Take 2,000 Units by mouth daily. Insulin Needles, Disposable, (Rupali Pen Needle) 32 gauge x 5/32\" ndle USE ONCE DAILY DX. CODE E11.65 100 Pen Needle 3    furosemide (LASIX) 20 mg tablet Take 20 mg by mouth daily. flash glucose scanning reader (FreeStyle Chance 2 [place]) misc To use it for checking sugars (Patient not taking: Reported on 4/21/2022) 1 Each 0    rosuvastatin (CRESTOR) 20 mg tablet TAKE 1 TABLET NIGHTLY (STOP 40 MG DOSE) 90 Tablet 3    amLODIPine (NORVASC) 10 mg tablet Take 10 mg by mouth daily. clopidogreL (PLAVIX) 75 mg tab Take 75 mg by mouth daily. sacubitriL-valsartan (Entresto) 49-51 mg tab tablet Take 1 Tab by mouth two (2) times a day. insulin glargine (Lantus Solostar U-100 Insulin) 100 unit/mL (3 mL) inpn Inject 8 units daily. 15 mL 3    OTHER Liquid Protein      hydrALAZINE (APRESOLINE) 25 mg tablet Take 25 mg by mouth three (3) times daily. 1    sevelamer carbonate (RENVELA) 800 mg tab tab Take  by mouth three (3) times daily.       FREESTYLE LITE STRIPS strip TEST FOUR TIMES DAILY 200 Strip 0    pantoprazole (PROTONIX) 40 mg tablet       FREESTYLE LANCETS 28 gauge misc       acetaminophen (TYLENOL EXTRA STRENGTH) 500 mg tablet Take  by mouth every six (6) hours as needed for Pain. Lancets (FREESTYLE LANCETS) misc Test 4 times daily. Dx code E11.65 200 Each 6    docusate sodium (COLACE) 100 mg capsule Take 100 mg by mouth as needed for Constipation. carvedilol (COREG) 25 mg tablet Take 25 mg by mouth two (2) times daily (with meals). ferrous sulfate 325 mg (65 mg iron) tablet Take 325 mg by mouth as needed. Past History   Past Medical History:  Past Medical History:   Diagnosis Date    Blind     CKD (chronic kidney disease)     HTN (hypertension)     Retinopathy     Type II or unspecified type diabetes mellitus with renal manifestations, uncontrolled(250.42) ([facility] [address]         Past Surgical History:  Past Surgical History:   Procedure Laterality Date    HX CORONARY ARTERY BYPASS GRAFT      HX GYN      hysterectomy        Family History:  Family History   Problem Relation Age of Onset    Diabetes Maternal Aunt     Diabetes Maternal Grandmother        Social History:  Social History     Tobacco Use    Smoking status: Never    Smokeless tobacco: Never   Substance Use Topics    Alcohol use: No    Drug use: Never       Allergies:  No Known Allergies     Review of Systems   Review of Systems   10 point review of systems completed and otherwise negative other than reported in the HPI    Physical Exam   Constitutional: No acute distress. Well-nourished. Skin: No rash. ENT: No rhinorrhea. No cough. Head is normocephalic and atraumatic. Eye: No proptosis or conjunctival injections. Respiratory: No apparent respiratory distress. Lung sounds are clear. Gastrointestinal: Nondistended. Soft and nontender. Musculoskeletal: No obvious bony deformities. Cardio: Tachycardic with regular rhythm. No murmurs. 2+ radial pulses.     Lab and Diagnostic Study Results   Labs -     Recent Results (from the past 12 hour(s))   CBC WITH AUTOMATED DIFF    Collection Time: 07/27/22  3:32 PM   Result Value Ref Range    WBC 6.8 3.6 - 11.0 K/uL    RBC 2.86 (L) 3.80 - 5.20 M/uL    HGB 9.2 (L) 11.5 - 16.0 g/dL    HCT 28.6 (L) 35.0 - 47.0 %    .0 (H) 80.0 - 99.0 FL    MCH 32.2 26.0 - 34.0 PG    MCHC 32.2 30.0 - 36.5 g/dL    RDW 17.0 (H) 11.5 - 14.5 %    PLATELET 932 (L) 311 - 400 K/uL    MPV 13.5 (H) 8.9 - 12.9 FL    NRBC 0.0 0.0  WBC    ABSOLUTE NRBC 0.00 0.00 - 0.01 K/uL    NEUTROPHILS 78 (H) 32 - 75 %    LYMPHOCYTES 12 12 - 49 %    MONOCYTES 7 5 - 13 %    EOSINOPHILS 2 0 - 7 %    BASOPHILS 1 0 - 1 %    IMMATURE GRANULOCYTES 0 0 - 0.5 %    ABS. NEUTROPHILS 5.4 1.8 - 8.0 K/UL    ABS. LYMPHOCYTES 0.8 0.8 - 3.5 K/UL    ABS. MONOCYTES 0.4 0.0 - 1.0 K/UL    ABS. EOSINOPHILS 0.1 0.0 - 0.4 K/UL    ABS. BASOPHILS 0.1 0.0 - 0.1 K/UL    ABS. IMM. GRANS. 0.0 0.00 - 0.04 K/UL    DF AUTOMATED     METABOLIC PANEL, COMPREHENSIVE    Collection Time: 07/27/22  3:32 PM   Result Value Ref Range    Sodium 134 (L) 136 - 145 mmol/L    Potassium 7.2 (HH) 3.5 - 5.1 mmol/L    Chloride 100 97 - 108 mmol/L    CO2 20 (L) 21 - 32 mmol/L    Anion gap 14 5 - 15 mmol/L    Glucose 76 65 - 100 mg/dL    BUN 91 (H) 6 - 20 mg/dL    Creatinine 14.40 (H) 0.55 - 1.02 mg/dL    BUN/Creatinine ratio 6 (L) 12 - 20      GFR est AA 3 (L) >60 ml/min/1.73m2    GFR est non-AA 3 (L) >60 ml/min/1.73m2    Calcium 9.8 8.5 - 10.1 mg/dL    Bilirubin, total 0.7 0.2 - 1.0 mg/dL    AST (SGOT) 21 15 - 37 U/L    ALT (SGPT) 26 12 - 78 U/L    Alk.  phosphatase 121 (H) 45 - 117 U/L    Protein, total 7.2 6.4 - 8.2 g/dL    Albumin 3.8 3.5 - 5.0 g/dL    Globulin 3.4 2.0 - 4.0 g/dL    A-G Ratio 1.1 1.1 - 2.2     TROPONIN-HIGH SENSITIVITY    Collection Time: 07/27/22  3:32 PM   Result Value Ref Range    Troponin-High Sensitivity 88 (H) 0 - 51 ng/L   NT-PRO BNP    Collection Time: 07/27/22  3:32 PM   Result Value Ref Range    NT pro-BNP >35,000 (H) <125 pg/mL   POTASSIUM    Collection Time: 07/27/22  5:20 PM   Result Value Ref Range    Potassium 7.2 (HH) 3.5 - 5.1 mmol/L   HEPATIC FUNCTION PANEL    Collection Time: 07/27/22  5:20 PM   Result Value Ref Range    Protein, total 7.1 6.4 - 8.2 g/dL    Albumin 3.7 3.5 - 5.0 g/dL    Globulin 3.4 2.0 - 4.0 g/dL    A-G Ratio 1.1 1.1 - 2.2      Bilirubin, total 0.7 0.2 - 1.0 mg/dL    Bilirubin, direct 0.3 (H) 0.0 - 0.2 mg/dL    Alk. phosphatase 114 45 - 117 U/L    AST (SGOT) 20 15 - 37 U/L    ALT (SGPT) 25 12 - 78 U/L   LIPASE    Collection Time: 07/27/22  5:20 PM   Result Value Ref Range    Lipase 97 73 - 741 U/L   METABOLIC PANEL, BASIC    Collection Time: 07/27/22  8:45 PM   Result Value Ref Range    Sodium 139 136 - 145 mmol/L    Potassium 6.4 (H) 3.5 - 5.1 mmol/L    Chloride 103 97 - 108 mmol/L    CO2 23 21 - 32 mmol/L    Anion gap 13 5 - 15 mmol/L    Glucose 73 65 - 100 mg/dL    BUN 92 (H) 6 - 20 mg/dL    Creatinine 14.60 (H) 0.55 - 1.02 mg/dL    BUN/Creatinine ratio 6 (L) 12 - 20      GFR est AA 3 (L) >60 ml/min/1.73m2    GFR est non-AA 3 (L) >60 ml/min/1.73m2    Calcium 10.2 (H) 8.5 - 10.1 mg/dL       Radiologic Studies -   [unfilled]  CT Results  (Last 48 hours)      None          CXR Results  (Last 48 hours)                 07/27/22 1620  XR CHEST PORT Final result    Impression:  Interstitial and parenchymal pulmonary edema with cardiomegaly. Narrative:  Clinical history: chest pain   INDICATION:   chest pain   COMPARISON: 3/11/2022       FINDINGS:   AP portable upright view of the chest demonstrates a stable enlarged   cardiopericardial silhouette. Interstitial and hazy parenchymal opacities are   increased. Venous stents on the right right subclavian, SVC. Rawleigh Billing There is a cardiac   pacer. The patient is poststernotomy. Likely small left effusion. Patient is on   a cardiac monitor.                     Medical Decision Making and ED Course   - I am the first and primary provider for this patient AND AM THE PRIMARY PROVIDER OF RECORD. I reviewed the vital signs, available nursing notes, past medical history, past surgical history, family history and social history. - Initial assessment performed. The patients presenting problems have been discussed, and the staff are in agreement with the care plan formulated and outlined with them. I have encouraged them to ask questions as they arise throughout their visit. Vital Signs-Reviewed the patient's vital signs. Patient Vitals for the past 12 hrs:   Temp Pulse Resp BP SpO2   07/27/22 1525 97.9 °F (36.6 °C) (!) 111 19 (!) 180/92 98 %       EKG interpretation: (Preliminary): EKG Interpreted by ED physician. Obtained on 7/27/2022 at 1526. Read at  by ED physician. Sinus tachycardia at rate of 110 bpm.  Normal IN interval, QRS duration, QTc interval.  No ST segment abnormalities. Normal axis. MDM  The differential diagnosis is ACS, atypical chest pain, hypertensive urgency, electrolyte abnormality, hyperkalemia, pulmonary edema. Work-up remarkable for mild pulmonary edema on chest x-ray and potassium of 7.2. She was given IV calcium, bicarbonate, and Kayexalate. She is also given insulin with dextrose. Potassium is starting to improve and is now 6.4. We will give another dose of Kayexalate. Case discussed with Dr. Qi Leal, the nephrologist who will consult and patient will get dialysis tomorrow. Discussed case with Dr. Nhan Churchill, the hospitalist who will admit. Troponin is negative. No other acute concerns. I do believe patient is safe overnight to get dialysis in the morning.          Procedures and Critical Care       CRITICAL CARE NOTE :  5:09 PM  Amount of Critical Care Time: 60minutes    IMPENDING DETERIORATION -Respiratory and Cardiovascular  ASSOCIATED RISK FACTORS - Metabolic changes  MANAGEMENT- Bedside Assessment and Supervision of Care  INTERPRETATION -  ECG  INTERVENTIONS - Metobolic interventions  CASE REVIEW - Medical Sub-Specialist  TREATMENT RESPONSE -Improved  PERFORMED BY - Self    NOTES   :  I have spent the above critical care time involved in lab review, consultations with specialist, family decision- making, bedside attention and documentation. This time excludes time spent in any separate billed procedures. During this entire length of time I was immediately available to the patient . Laith Malcolm DO    Disposition     Disposition: Admitted to Dr. Albertina Hunt    Diagnosis/Clinical Impression   Clinical Impression:   1. Acute hyperkalemia    2. Chest pain, unspecified type           Attestations:  Laith Malcolm DO    Please note that this dictation was completed with [employer], the computer voice recognition software. Quite often unanticipated grammatical, syntax, homophones, and other interpretive errors are inadvertently transcribed by the computer software. Please disregard these errors. Please excuse any errors that have escaped final proofreading. Thank you.

## 2022-07-28 ENCOUNTER — APPOINTMENT (OUTPATIENT)
Dept: NUCLEAR MEDICINE | Age: 67
DRG: 640 | End: 2022-07-28
Attending: INTERNAL MEDICINE
Payer: MEDICARE

## 2022-07-28 ENCOUNTER — APPOINTMENT (OUTPATIENT)
Dept: CT IMAGING | Age: 67
DRG: 640 | End: 2022-07-28
Attending: INTERNAL MEDICINE
Payer: MEDICARE

## 2022-07-28 ENCOUNTER — APPOINTMENT (OUTPATIENT)
Dept: NON INVASIVE DIAGNOSTICS | Age: 67
DRG: 640 | End: 2022-07-28
Attending: INTERNAL MEDICINE
Payer: MEDICARE

## 2022-07-28 ENCOUNTER — APPOINTMENT (OUTPATIENT)
Dept: GENERAL RADIOLOGY | Age: 67
DRG: 640 | End: 2022-07-28
Attending: INTERNAL MEDICINE
Payer: MEDICARE

## 2022-07-28 PROBLEM — E87.5 HYPERKALEMIA: Status: ACTIVE | Noted: 2022-07-28

## 2022-07-28 LAB
ALBUMIN SERPL-MCNC: 3.6 G/DL (ref 3.5–5)
ANION GAP SERPL CALC-SCNC: 13 MMOL/L (ref 5–15)
ANION GAP SERPL CALC-SCNC: 14 MMOL/L (ref 5–15)
ATRIAL RATE: 110 BPM
BUN SERPL-MCNC: 94 MG/DL (ref 6–20)
BUN SERPL-MCNC: 95 MG/DL (ref 6–20)
BUN/CREAT SERPL: 6 (ref 12–20)
BUN/CREAT SERPL: 7 (ref 12–20)
CA-I BLD-MCNC: 9.7 MG/DL (ref 8.5–10.1)
CA-I BLD-MCNC: 9.8 MG/DL (ref 8.5–10.1)
CALCULATED P AXIS, ECG09: 53 DEGREES
CALCULATED R AXIS, ECG10: 40 DEGREES
CALCULATED T AXIS, ECG11: 77 DEGREES
CHLORIDE SERPL-SCNC: 103 MMOL/L (ref 97–108)
CHLORIDE SERPL-SCNC: 103 MMOL/L (ref 97–108)
CO2 SERPL-SCNC: 22 MMOL/L (ref 21–32)
CO2 SERPL-SCNC: 23 MMOL/L (ref 21–32)
CREAT SERPL-MCNC: 14.6 MG/DL (ref 0.55–1.02)
CREAT SERPL-MCNC: 14.8 MG/DL (ref 0.55–1.02)
DIAGNOSIS, 93000: NORMAL
ECHO AO ASC DIAM: 2.8 CM
ECHO AO ASCENDING AORTA INDEX: 1.6 CM/M2
ECHO AO ROOT DIAM: 3 CM
ECHO AO ROOT INDEX: 1.71 CM/M2
ECHO AV AREA PEAK VELOCITY: 2.1 CM2
ECHO AV AREA VTI: 2.3 CM2
ECHO AV AREA/BSA PEAK VELOCITY: 1.2 CM2/M2
ECHO AV AREA/BSA VTI: 1.3 CM2/M2
ECHO AV MEAN GRADIENT: 3 MMHG
ECHO AV MEAN VELOCITY: 0.7 M/S
ECHO AV PEAK GRADIENT: 5 MMHG
ECHO AV PEAK VELOCITY: 1.1 M/S
ECHO AV VELOCITY RATIO: 0.64
ECHO AV VTI: 16.9 CM
ECHO EST RA PRESSURE: 8 MMHG
ECHO LA AREA 2C: 15.2 CM2
ECHO LA AREA 4C: 14 CM2
ECHO LA DIAMETER INDEX: 2.06 CM/M2
ECHO LA DIAMETER: 3.6 CM
ECHO LA MAJOR AXIS: 4.2 CM
ECHO LA MINOR AXIS: 3.8 CM
ECHO LA TO AORTIC ROOT RATIO: 1.2
ECHO LA VOL 4C: 34 ML (ref 22–52)
ECHO LA VOL BP: 42 ML (ref 22–52)
ECHO LA VOL/BSA BIPLANE: 24 ML/M2 (ref 16–34)
ECHO LA VOLUME INDEX A4C: 19 ML/M2 (ref 16–34)
ECHO LV E' LATERAL VELOCITY: 8 CM/S
ECHO LV E' SEPTAL VELOCITY: 3 CM/S
ECHO LV EDV A2C: 123 ML
ECHO LV EDV A4C: 120 ML
ECHO LV EDV INDEX A4C: 69 ML/M2
ECHO LV EDV NDEX A2C: 70 ML/M2
ECHO LV EJECTION FRACTION A2C: 28 %
ECHO LV EJECTION FRACTION A4C: 29 %
ECHO LV EJECTION FRACTION BIPLANE: 30 % (ref 55–100)
ECHO LV ESV A2C: 88 ML
ECHO LV ESV A4C: 85 ML
ECHO LV ESV INDEX A2C: 50 ML/M2
ECHO LV ESV INDEX A4C: 49 ML/M2
ECHO LV FRACTIONAL SHORTENING: 13 % (ref 28–44)
ECHO LV INTERNAL DIMENSION DIASTOLE INDEX: 3.09 CM/M2
ECHO LV INTERNAL DIMENSION DIASTOLIC MMODE: 5.4 CM (ref 3.9–5.3)
ECHO LV INTERNAL DIMENSION DIASTOLIC: 5.4 CM (ref 3.9–5.3)
ECHO LV INTERNAL DIMENSION SYSTOLIC INDEX: 2.69 CM/M2
ECHO LV INTERNAL DIMENSION SYSTOLIC MMODE: 5 CM
ECHO LV INTERNAL DIMENSION SYSTOLIC: 4.7 CM
ECHO LV IVSD MMODE: 1.3 CM (ref 0.6–0.9)
ECHO LV IVSD: 1.1 CM (ref 0.6–0.9)
ECHO LV MASS 2D: 234.8 G (ref 67–162)
ECHO LV MASS INDEX 2D: 134.2 G/M2 (ref 43–95)
ECHO LV POSTERIOR WALL DIASTOLIC MMODE: 1.2 CM (ref 0.6–0.9)
ECHO LV POSTERIOR WALL DIASTOLIC: 1.1 CM (ref 0.6–0.9)
ECHO LV RELATIVE WALL THICKNESS RATIO: 0.41
ECHO LVOT AREA: 3.5 CM2
ECHO LVOT AV VTI INDEX: 0.66
ECHO LVOT DIAM: 2.1 CM
ECHO LVOT MEAN GRADIENT: 1 MMHG
ECHO LVOT PEAK GRADIENT: 2 MMHG
ECHO LVOT PEAK VELOCITY: 0.7 M/S
ECHO LVOT STROKE VOLUME INDEX: 22 ML/M2
ECHO LVOT SV: 38.4 ML
ECHO LVOT VTI: 11.1 CM
ECHO MV A VELOCITY: 0.43 M/S
ECHO MV AREA VTI: 2.1 CM2
ECHO MV E VELOCITY: 1.3 M/S
ECHO MV E/A RATIO: 3.02
ECHO MV E/E' LATERAL: 16.25
ECHO MV E/E' RATIO (AVERAGED): 29.79
ECHO MV E/E' SEPTAL: 43.33
ECHO MV EROA CONT EQ: 0.1 CM2
ECHO MV LVOT VTI INDEX: 1.64
ECHO MV MAX VELOCITY: 1.5 M/S
ECHO MV MEAN GRADIENT: 2 MMHG
ECHO MV MEAN VELOCITY: 0.7 M/S
ECHO MV PEAK GRADIENT: 9 MMHG
ECHO MV REGURGITANT ALIASING (NYQUIST) VELOCITY: 92 CM/S
ECHO MV REGURGITANT PEAK GRADIENT: 135 MMHG
ECHO MV REGURGITANT PEAK VELOCITY: 5.8 M/S
ECHO MV REGURGITANT VTIA: 164 CM
ECHO MV VTI: 18.2 CM
ECHO PULMONARY ARTERY END DIASTOLIC PRESSURE: 18 MMHG
ECHO PV MAX VELOCITY: 0.6 M/S
ECHO PV MEAN GRADIENT: 1 MMHG
ECHO PV MEAN VELOCITY: 0.4 M/S
ECHO PV PEAK GRADIENT: 2 MMHG
ECHO PV REGURGITANT MAX VELOCITY: 2.1 M/S
ECHO PV VTI: 10.5 CM
ECHO RIGHT VENTRICULAR SYSTOLIC PRESSURE (RVSP): 87 MMHG
ECHO RV FREE WALL PEAK S': 8 CM/S
ECHO RV TAPSE: 1.1 CM (ref 1.7–?)
ECHO TV REGURGITANT MAX VELOCITY: 4.44 M/S
ECHO TV REGURGITANT PEAK GRADIENT: 79 MMHG
GLUCOSE BLD STRIP.AUTO-MCNC: 204 MG/DL (ref 65–117)
GLUCOSE BLD STRIP.AUTO-MCNC: 86 MG/DL (ref 65–117)
GLUCOSE SERPL-MCNC: 100 MG/DL (ref 65–100)
GLUCOSE SERPL-MCNC: 99 MG/DL (ref 65–100)
P-R INTERVAL, ECG05: 174 MS
PERFORMED BY, TECHID: ABNORMAL
PERFORMED BY, TECHID: NORMAL
PHOSPHATE SERPL-MCNC: 4.6 MG/DL (ref 2.6–4.7)
POTASSIUM SERPL-SCNC: 5.9 MMOL/L (ref 3.5–5.1)
POTASSIUM SERPL-SCNC: 5.9 MMOL/L (ref 3.5–5.1)
Q-T INTERVAL, ECG07: 356 MS
QRS DURATION, ECG06: 106 MS
QTC CALCULATION (BEZET), ECG08: 481 MS
SODIUM SERPL-SCNC: 139 MMOL/L (ref 136–145)
SODIUM SERPL-SCNC: 139 MMOL/L (ref 136–145)
TROPONIN-HIGH SENSITIVITY: 107 NG/L (ref 0–51)
VENTRICULAR RATE, ECG03: 110 BPM

## 2022-07-28 PROCEDURE — A9537 TC99M MEBROFENIN: HCPCS

## 2022-07-28 PROCEDURE — 82962 GLUCOSE BLOOD TEST: CPT

## 2022-07-28 PROCEDURE — 80069 RENAL FUNCTION PANEL: CPT

## 2022-07-28 PROCEDURE — 74011250637 HC RX REV CODE- 250/637: Performed by: INTERNAL MEDICINE

## 2022-07-28 PROCEDURE — 80048 BASIC METABOLIC PNL TOTAL CA: CPT

## 2022-07-28 PROCEDURE — 74011000250 HC RX REV CODE- 250: Performed by: INTERNAL MEDICINE

## 2022-07-28 PROCEDURE — 74018 RADEX ABDOMEN 1 VIEW: CPT

## 2022-07-28 PROCEDURE — 99221 1ST HOSP IP/OBS SF/LOW 40: CPT | Performed by: SURGERY

## 2022-07-28 PROCEDURE — 90935 HEMODIALYSIS ONE EVALUATION: CPT

## 2022-07-28 PROCEDURE — G0257 UNSCHED DIALYSIS ESRD PT HOS: HCPCS

## 2022-07-28 PROCEDURE — 84484 ASSAY OF TROPONIN QUANT: CPT

## 2022-07-28 PROCEDURE — 93306 TTE W/DOPPLER COMPLETE: CPT

## 2022-07-28 PROCEDURE — 74011250636 HC RX REV CODE- 250/636: Performed by: INTERNAL MEDICINE

## 2022-07-28 PROCEDURE — 74176 CT ABD & PELVIS W/O CONTRAST: CPT

## 2022-07-28 PROCEDURE — 5A1D70Z PERFORMANCE OF URINARY FILTRATION, INTERMITTENT, LESS THAN 6 HOURS PER DAY: ICD-10-PCS | Performed by: INTERNAL MEDICINE

## 2022-07-28 PROCEDURE — 74011636637 HC RX REV CODE- 636/637: Performed by: INTERNAL MEDICINE

## 2022-07-28 PROCEDURE — 65270000029 HC RM PRIVATE

## 2022-07-28 PROCEDURE — 36415 COLL VENOUS BLD VENIPUNCTURE: CPT

## 2022-07-28 RX ORDER — CARVEDILOL 12.5 MG/1
25 TABLET ORAL 2 TIMES DAILY WITH MEALS
Status: DISCONTINUED | OUTPATIENT
Start: 2022-07-28 | End: 2022-07-31 | Stop reason: HOSPADM

## 2022-07-28 RX ORDER — ACETAMINOPHEN 650 MG/1
650 SUPPOSITORY RECTAL
Status: DISCONTINUED | OUTPATIENT
Start: 2022-07-28 | End: 2022-07-31 | Stop reason: HOSPADM

## 2022-07-28 RX ORDER — MORPHINE SULFATE 2 MG/ML
2 INJECTION, SOLUTION INTRAMUSCULAR; INTRAVENOUS ONCE
Status: COMPLETED | OUTPATIENT
Start: 2022-07-28 | End: 2022-07-28

## 2022-07-28 RX ORDER — FUROSEMIDE 40 MG/1
20 TABLET ORAL DAILY
Status: DISCONTINUED | OUTPATIENT
Start: 2022-07-28 | End: 2022-07-31 | Stop reason: HOSPADM

## 2022-07-28 RX ORDER — LANOLIN ALCOHOL/MO/W.PET/CERES
325 CREAM (GRAM) TOPICAL
Status: DISCONTINUED | OUTPATIENT
Start: 2022-07-28 | End: 2022-07-31 | Stop reason: HOSPADM

## 2022-07-28 RX ORDER — ACETAMINOPHEN 325 MG/1
650 TABLET ORAL
Status: DISCONTINUED | OUTPATIENT
Start: 2022-07-28 | End: 2022-07-31 | Stop reason: HOSPADM

## 2022-07-28 RX ORDER — CLOPIDOGREL BISULFATE 75 MG/1
75 TABLET ORAL DAILY
Status: DISCONTINUED | OUTPATIENT
Start: 2022-07-28 | End: 2022-07-31 | Stop reason: HOSPADM

## 2022-07-28 RX ORDER — SODIUM POLYSTYRENE SULFONATE 15 G/60ML
15 SUSPENSION ORAL; RECTAL 2 TIMES DAILY
Status: DISCONTINUED | OUTPATIENT
Start: 2022-07-28 | End: 2022-07-28

## 2022-07-28 RX ORDER — SODIUM CHLORIDE 0.9 % (FLUSH) 0.9 %
5-40 SYRINGE (ML) INJECTION EVERY 8 HOURS
Status: DISCONTINUED | OUTPATIENT
Start: 2022-07-28 | End: 2022-07-31 | Stop reason: HOSPADM

## 2022-07-28 RX ORDER — SODIUM CHLORIDE 0.9 % (FLUSH) 0.9 %
5-40 SYRINGE (ML) INJECTION AS NEEDED
Status: DISCONTINUED | OUTPATIENT
Start: 2022-07-28 | End: 2022-07-31 | Stop reason: HOSPADM

## 2022-07-28 RX ORDER — HEPARIN SODIUM 5000 [USP'U]/ML
5000 INJECTION, SOLUTION INTRAVENOUS; SUBCUTANEOUS EVERY 8 HOURS
Status: DISCONTINUED | OUTPATIENT
Start: 2022-07-28 | End: 2022-07-31 | Stop reason: HOSPADM

## 2022-07-28 RX ORDER — HYDRALAZINE HYDROCHLORIDE 25 MG/1
25 TABLET, FILM COATED ORAL 3 TIMES DAILY
Status: DISCONTINUED | OUTPATIENT
Start: 2022-07-28 | End: 2022-07-29

## 2022-07-28 RX ORDER — MAGNESIUM SULFATE 100 %
4 CRYSTALS MISCELLANEOUS AS NEEDED
Status: DISCONTINUED | OUTPATIENT
Start: 2022-07-28 | End: 2022-07-31 | Stop reason: HOSPADM

## 2022-07-28 RX ORDER — SEVELAMER CARBONATE 800 MG/1
800 TABLET, FILM COATED ORAL
Status: DISCONTINUED | OUTPATIENT
Start: 2022-07-28 | End: 2022-07-28

## 2022-07-28 RX ORDER — ONDANSETRON 4 MG/1
4 TABLET, ORALLY DISINTEGRATING ORAL
Status: DISCONTINUED | OUTPATIENT
Start: 2022-07-28 | End: 2022-07-31 | Stop reason: HOSPADM

## 2022-07-28 RX ORDER — AMLODIPINE BESYLATE 5 MG/1
10 TABLET ORAL DAILY
Status: DISCONTINUED | OUTPATIENT
Start: 2022-07-28 | End: 2022-07-31 | Stop reason: HOSPADM

## 2022-07-28 RX ORDER — SEVELAMER CARBONATE 800 MG/1
800 TABLET, FILM COATED ORAL
Status: DISCONTINUED | OUTPATIENT
Start: 2022-07-28 | End: 2022-07-31 | Stop reason: HOSPADM

## 2022-07-28 RX ORDER — KIT FOR THE PREPARATION OF TECHNETIUM TC 99M MEBROFENIN 45 MG/10ML
7.23 INJECTION, POWDER, LYOPHILIZED, FOR SOLUTION INTRAVENOUS
Status: COMPLETED | OUTPATIENT
Start: 2022-07-28 | End: 2022-07-28

## 2022-07-28 RX ORDER — FUROSEMIDE 10 MG/ML
40 INJECTION INTRAMUSCULAR; INTRAVENOUS ONCE
Status: COMPLETED | OUTPATIENT
Start: 2022-07-28 | End: 2022-07-28

## 2022-07-28 RX ORDER — ONDANSETRON 2 MG/ML
4 INJECTION INTRAMUSCULAR; INTRAVENOUS
Status: DISCONTINUED | OUTPATIENT
Start: 2022-07-28 | End: 2022-07-31 | Stop reason: HOSPADM

## 2022-07-28 RX ORDER — INSULIN GLARGINE 100 [IU]/ML
8 INJECTION, SOLUTION SUBCUTANEOUS DAILY
Status: DISCONTINUED | OUTPATIENT
Start: 2022-07-28 | End: 2022-07-31 | Stop reason: HOSPADM

## 2022-07-28 RX ORDER — LABETALOL HCL 20 MG/4 ML
10 SYRINGE (ML) INTRAVENOUS
Status: DISCONTINUED | OUTPATIENT
Start: 2022-07-28 | End: 2022-07-31 | Stop reason: HOSPADM

## 2022-07-28 RX ORDER — ATORVASTATIN CALCIUM 40 MG/1
40 TABLET, FILM COATED ORAL
Status: DISCONTINUED | OUTPATIENT
Start: 2022-07-28 | End: 2022-07-31 | Stop reason: HOSPADM

## 2022-07-28 RX ORDER — POLYETHYLENE GLYCOL 3350 17 G/17G
17 POWDER, FOR SOLUTION ORAL DAILY PRN
Status: DISCONTINUED | OUTPATIENT
Start: 2022-07-28 | End: 2022-07-31 | Stop reason: HOSPADM

## 2022-07-28 RX ORDER — INSULIN LISPRO 100 [IU]/ML
INJECTION, SOLUTION INTRAVENOUS; SUBCUTANEOUS
Status: DISCONTINUED | OUTPATIENT
Start: 2022-07-28 | End: 2022-07-31 | Stop reason: HOSPADM

## 2022-07-28 RX ORDER — INSULIN LISPRO 100 [IU]/ML
INJECTION, SOLUTION INTRAVENOUS; SUBCUTANEOUS
Status: DISCONTINUED | OUTPATIENT
Start: 2022-07-28 | End: 2022-07-28

## 2022-07-28 RX ORDER — MELATONIN
2000 DAILY
Status: DISCONTINUED | OUTPATIENT
Start: 2022-07-28 | End: 2022-07-31 | Stop reason: HOSPADM

## 2022-07-28 RX ORDER — DEXTROSE MONOHYDRATE 100 MG/ML
0-250 INJECTION, SOLUTION INTRAVENOUS AS NEEDED
Status: DISCONTINUED | OUTPATIENT
Start: 2022-07-28 | End: 2022-07-31 | Stop reason: HOSPADM

## 2022-07-28 RX ORDER — PANTOPRAZOLE SODIUM 40 MG/1
40 TABLET, DELAYED RELEASE ORAL
Status: DISCONTINUED | OUTPATIENT
Start: 2022-07-28 | End: 2022-07-31 | Stop reason: HOSPADM

## 2022-07-28 RX ADMIN — HYDRALAZINE HYDROCHLORIDE 25 MG: 25 TABLET, FILM COATED ORAL at 21:46

## 2022-07-28 RX ADMIN — ATORVASTATIN CALCIUM 40 MG: 40 TABLET, FILM COATED ORAL at 21:46

## 2022-07-28 RX ADMIN — HYDRALAZINE HYDROCHLORIDE 25 MG: 25 TABLET, FILM COATED ORAL at 17:33

## 2022-07-28 RX ADMIN — HEPARIN SODIUM 5000 UNITS: 5000 INJECTION INTRAVENOUS; SUBCUTANEOUS at 21:46

## 2022-07-28 RX ADMIN — MORPHINE SULFATE 2 MG: 2 INJECTION, SOLUTION INTRAMUSCULAR; INTRAVENOUS at 03:19

## 2022-07-28 RX ADMIN — HEPARIN SODIUM 5000 UNITS: 5000 INJECTION INTRAVENOUS; SUBCUTANEOUS at 17:33

## 2022-07-28 RX ADMIN — SEVELAMER CARBONATE 800 MG: 800 TABLET, FILM COATED ORAL at 17:33

## 2022-07-28 RX ADMIN — INSULIN LISPRO 4 UNITS: 100 INJECTION, SOLUTION INTRAVENOUS; SUBCUTANEOUS at 22:17

## 2022-07-28 RX ADMIN — ATORVASTATIN CALCIUM 40 MG: 40 TABLET, FILM COATED ORAL at 03:19

## 2022-07-28 RX ADMIN — EPOETIN ALFA-EPBX 10000 UNITS: 10000 INJECTION, SOLUTION INTRAVENOUS; SUBCUTANEOUS at 11:27

## 2022-07-28 RX ADMIN — KIT FOR THE PREPARATION OF TECHNETIUM TC 99M MEBROFENIN 7.23 MILLICURIE: 45 INJECTION, POWDER, LYOPHILIZED, FOR SOLUTION INTRAVENOUS at 13:05

## 2022-07-28 RX ADMIN — SODIUM CHLORIDE, PRESERVATIVE FREE 10 ML: 5 INJECTION INTRAVENOUS at 21:52

## 2022-07-28 RX ADMIN — FUROSEMIDE 40 MG: 10 INJECTION, SOLUTION INTRAMUSCULAR; INTRAVENOUS at 03:19

## 2022-07-28 RX ADMIN — SODIUM CHLORIDE, PRESERVATIVE FREE 10 ML: 5 INJECTION INTRAVENOUS at 17:58

## 2022-07-28 RX ADMIN — HEPARIN SODIUM 5000 UNITS: 5000 INJECTION INTRAVENOUS; SUBCUTANEOUS at 07:05

## 2022-07-28 RX ADMIN — CARVEDILOL 25 MG: 12.5 TABLET, FILM COATED ORAL at 17:48

## 2022-07-28 NOTE — ACP (ADVANCE CARE PLANNING)
Advance Care Planning   Healthcare Decision Maker:       Primary Decision Maker:  Lindy Free - [phone]

## 2022-07-28 NOTE — PROGRESS NOTES
[facility] Hospitalist Progress Note  Mir Justus Sicard MD    Date:2022       Room:FRANCINE/FRANCINE  Patient Name:Hanh Canela     YOB: 1955     Age:67 y.o.    22 admission course  Du Greenwood is a 75 y.o. female with PMH of ESRD, CAD, PAD, hypertension, diabetes, anemia and blind. She presented to the ED with chief complaint of pain at multiple sites. On my evaluation, she was in tears and saying that have been having flank pain , radiating to right abdomen for the past 2 days, associated with nausea, vomiting and cold sweats. No fever or chills. Pain is severe and intermittent, no known exacerbating or relieving factor. Taken tylenol without symptoms improvement. Per ED physician, however, she complained of chest pain and leg acheness, which she did not complained to me. She states that she did miss her hemodialysis today since she has not been feeling well. She reports sill producing little amount of urine. In the ED, noted K of 7.2 initially. Given IV insulin and kayexalate x 1. Repeat K is 6.4. Another dose of kayexalate given, repeat pending. 22 no new complaints, tolerating medications well  Hemodialysis anticipated for today    Subjective    Subjective:  Symptoms:  Stable. Review of Systems   All other systems reviewed and are negative. Objective         Vitals Last 24 Hours:  TEMPERATURE:  Temp  Av.9 °F (36.6 °C)  Min: 97.9 °F (36.6 °C)  Max: 97.9 °F (36.6 °C)  RESPIRATIONS RANGE: Resp  Av  Min: 19  Max: 19  PULSE OXIMETRY RANGE: SpO2  Av.5 %  Min: 93 %  Max: 100 %  PULSE RANGE: Pulse  Av.7  Min: 95  Max: 115  BLOOD PRESSURE RANGE: Systolic (23JMB), CSQ:924 , Min:156 , GRN:465   ; Diastolic (87AGJ), ZKP:88, Min:71, Max:96    I/O (24Hr): Intake/Output Summary (Last 24 hours) at 2022 0831  Last data filed at 2022  Gross per 24 hour   Intake 300 ml   Output --   Net 300 ml     Objective:  General Appearance:  Comfortable.     Vital signs: (most recent): Blood pressure (!) 156/71, pulse 95, temperature 97.9 °F (36.6 °C), resp. rate 19, height 5' 5\" (1.651 m), weight 68 kg (150 lb), SpO2 100 %. HEENT: Normal HEENT exam.    Lungs:  Normal effort. Heart: Normal rate. Regular rhythm. Abdomen: Abdomen is soft. Bowel sounds are normal.     Extremities: Normal range of motion. Neurological: Patient is alert and oriented to person, place and time. Pupils:  Pupils are equal, round, and reactive to light. Skin:  Warm and dry. Labs/Imaging/Diagnostics    Labs:  CBC:  Recent Labs     07/27/22  1532   WBC 6.8   RBC 2.86*   HGB 9.2*   HCT 28.6*   .0*   RDW 17.0*   *     CHEMISTRIES:  Recent Labs     07/27/22  2045 07/27/22  1720 07/27/22  1532     --  134*   K 6.4* 7.2* 7.2*     --  100   CO2 23  --  20*   BUN 92*  --  91*   CA 10.2*  --  9.8   PT/INR:No results for input(s): INR, INREXT in the last 72 hours. No lab exists for component: PROTIME  APTT:No results for input(s): APTT in the last 72 hours. LIVER PROFILE:  Recent Labs     07/27/22  1720 07/27/22  1532   AST 20 21   ALT 25 26     Lab Results   Component Value Date/Time    ALT (SGPT) 25 07/27/2022 05:20 PM    AST (SGOT) 20 07/27/2022 05:20 PM    Alk. phosphatase 114 07/27/2022 05:20 PM    Bilirubin, direct 0.3 (H) 07/27/2022 05:20 PM    Bilirubin, total 0.7 07/27/2022 05:20 PM       Imaging Last 24 Hours:  XR ABD (KUB)    Result Date: 7/28/2022  ABDOMINAL RADIOGRAPHS. 7/28/2022 3:12 AM INDICATION: Abdominal pain. COMPARISON: CT chest 3/11/2022, 11/16/2012. TECHNIQUE: AP supine  view/s of the abdomen. FINDINGS: The bowel gas pattern is normal. Lobulation of the anterior abdominal wall suggests multiple prior abdominal surgeries and lobulated, retracted fat. There is a gallstone in the right upper quadrant. The heart is enlarged. Post CABG and pacemaker placement. There is pulmonary vascular congestion without overt interstitial edema. 1. Cardiomegaly. Pulmonary vascular congestion without overt interstitial edema. 2. Normal bowel gas pattern. Lobulated, retracted abdominal wall. This should be readily evident on physical examination. CT ABD PELV WO CONT    Result Date: 7/28/2022  CT ABDOMEN AND PELVIS WITH CONTRAST. 7/28/2022 5:18 AM INDICATION: Abdominal pain, renal calculi. COMPARISON: CT chest 3/11/2022, 11/16/2012. TECHNIQUE: CT of the abdomen and pelvis was performed without contrast. CT dose reduction was achieved through use of a standardized protocol tailored for this examination and automatic exposure control for dose modulation. FINDINGS: Inferior chest: Passive atelectasis is associated with small bilateral pleural effusions. The heart is enlarged. A pacemaker/AICD is partially imaged. Abdomen: The gallbladder is massively distended (64 x 59 x 205 mm). It extends below the sacral promontory. Hyperdense fluid within it is likely inspissated bile. A stone protrudes laterally, which are scattered in the wall to the right (201-40). No definite wall thickening or pericholecystic fluid to further suggest acute cholecystitis. The bilateral kidneys are atrophic. The unenhanced distal esophagus, stomach, duodenum, liver, spleen, and adrenals are normal. The pancreas is poorly visualized, and likely fatty infiltrated. Pelvis: The unenhanced small bowel, ileocecal junction, colon, and bladder are normal. The appendix is not visualized; no pericecal inflammatory process. Post hysterectomy. No free air or fluid, and no abdominopelvic lymphadenopathy. 1. Massively dilated gallbladder extends below the sacral promontory. Hepatobiliary scintigraphy (\"HIDA scan\") is recommended to differentiate acute cholecystitis from a hydropic gallbladder. 2. Cardiomegaly. Small bilateral pleural effusions. The findings were called to Dr. Zoraida De La Cruz on 7/28/2022 at 0703 hours by Dr. Bridgette Harden.   789    XR CHEST PORT    Result Date: 7/27/2022  Clinical history: chest pain INDICATION:   chest pain COMPARISON: 3/11/2022 FINDINGS: AP portable upright view of the chest demonstrates a stable enlarged cardiopericardial silhouette. Interstitial and hazy parenchymal opacities are increased. Venous stents on the right right subclavian, SVC. Bula [place] There is a cardiac pacer. The patient is poststernotomy. Likely small left effusion. Patient is on a cardiac monitor. Interstitial and parenchymal pulmonary edema with cardiomegaly. Assessment//Plan   Active Problems:    Hyperkalemia (7/28/2022)      Assessment & Plan  7/27/22 admission course  Johann Cash is a 75 y.o. female with PMH of ESRD, CAD, PAD, hypertension, diabetes, anemia and blind. She presented to the ED with chief complaint of pain at multiple sites. On my evaluation, she was in tears and saying that have been having flank pain , radiating to right abdomen for the past 2 days, associated with nausea, vomiting and cold sweats. No fever or chills. Pain is severe and intermittent, no known exacerbating or relieving factor. Taken tylenol without symptoms improvement. Per ED physician, however, she complained of chest pain and leg acheness, which she did not complained to me. She states that she did miss her hemodialysis today since she has not been feeling well. She reports sill producing little amount of urine. In the ED, noted K of 7.2 initially. Given IV insulin and kayexalate x 1. Repeat K is 6.4. Another dose of kayexalate given, repeat pending. 7/28/22 no new complaints, tolerating medications well  Hemodialysis anticipated for today    ASSESSMENT AND PLAN    1) ESRD complicated by hyperkalemia and fluid overload and pulmonary edema. Development in the setting of missed hemodialysis. Nephrology on board   Hemodialysis per nephrology    2) Right sided abdominal pains, CT findings as above suggestive of Massively dilated gallbladder.      HIDA scan   Surgery to see    3) Cardiovascular issues including coronary disease, hypertension. [facility] did a nuclear srtress test 1/21/21 suggesting EF 48%, small left ventricular perfusion defect.      Telemetry monitoring   Echocardiogram   Continue home meds   Cardiology    4) Diabetes mellitus     Sliding scale lispro ACHS    5) DVT prophylaxis with heparin      Electronically signed by Varinder Cardenas MD on 7/28/2022 at 8:31 AM

## 2022-07-28 NOTE — H&P
History and Physical    Patient: Nadja Omalley MRN: [id number]  SSN: [id number]    YOB: 1955  Age: 75 y.o. Sex: female      Subjective: Nadja Omalley is a 75 y.o. female with PMH of ESRD, CAD, PAD, hypertension, diabetes, anemia and blind. She presented to the ED with chief complaint of pain at multiple sites. On my evaluation, she was in tears and saying that have been having flank pain , radiating to right abdomen for the past 2 days, associated with nausea, vomiting and cold sweats. No fever or chills. Pain is severe and intermittent, no known exacerbating or relieving factor. Taken tylenol without symptoms improvement. Per ED physician, however, she complained of chest pain and leg acheness, which she did not complained to me. She states that she did miss her hemodialysis today since she has not been feeling well. She reports sill producing little amount of urine. In the ED, noted K of 7.2 initially. Given IV insulin and kayexalate x 1. Repeat K is 6.4. Another dose of kayexalate given, repeat pending. Past Medical History:   Diagnosis Date    Blind     CKD (chronic kidney disease)     HTN (hypertension)     Retinopathy     Type II or unspecified type diabetes mellitus with renal manifestations, uncontrolled(250.42) (Formerly [facility])      Past Surgical History:   Procedure Laterality Date    HX CORONARY ARTERY BYPASS GRAFT      HX GYN      hysterectomy       Family History   Problem Relation Age of Onset    Diabetes Maternal Aunt     Diabetes Maternal Grandmother      Social History     Tobacco Use    Smoking status: Never    Smokeless tobacco: Never   Substance Use Topics    Alcohol use: No      Prior to Admission medications    Medication Sig Start Date End Date Taking?  Authorizing Provider   diclofenac (Voltaren Arthritis Pain) 1 % gel apply to foot and hands as needed for arthritis pain 9/21/21   Provider, Historical   cholecalciferol (Vitamin D3) (1000 Units /25 mcg) tablet Take 2,000 Units by mouth daily. Provider, Historical   Insulin Needles, Disposable, (Rupali Pen Needle) 32 gauge x 5/32\" ndle USE ONCE DAILY DX. CODE E11.65 4/21/22   Farideh Lucero MD   furosemide (LASIX) 20 mg tablet Take 20 mg by mouth daily. Provider, Historical   flash glucose scanning reader (FreeStyle Chance 2 Isabel) misc To use it for checking sugars  Patient not taking: Reported on 4/21/2022 10/19/21   Farideh Lucero MD   rosuvastatin (CRESTOR) 20 mg tablet TAKE 1 TABLET NIGHTLY (STOP 40 MG DOSE) 8/7/21   Farideh Lucero MD   amLODIPine (NORVASC) 10 mg tablet Take 10 mg by mouth daily. Provider, Historical   clopidogreL (PLAVIX) 75 mg tab Take 75 mg by mouth daily. Provider, Historical   sacubitriL-valsartan (Entresto) 49-51 mg tab tablet Take 1 Tab by mouth two (2) times a day. Provider, Historical   insulin glargine (Lantus Solostar U-100 Insulin) 100 unit/mL (3 mL) inpn Inject 8 units daily. 8/5/20   Farideh Lucero MD   OTHER Liquid Protein    Provider, Historical   hydrALAZINE (APRESOLINE) 25 mg tablet Take 25 mg by mouth three (3) times daily. 3/11/19   Provider, Historical   sevelamer carbonate (RENVELA) 800 mg tab tab Take  by mouth three (3) times daily. Provider, Historical   FREESTYLE LITE STRIPS strip TEST FOUR TIMES DAILY 7/9/18   Karla Barreto MD   pantoprazole (PROTONIX) 40 mg tablet  7/20/17   Provider, Historical   FREESTYLE LANCETS 28 gauge misc  2/4/17   Provider, Historical   acetaminophen (TYLENOL EXTRA STRENGTH) 500 mg tablet Take  by mouth every six (6) hours as needed for Pain. Provider, Historical   Lancets (FREESTYLE LANCETS) misc Test 4 times daily. Dx code E11.65 9/16/16   Farideh Lucero MD   docusate sodium (COLACE) 100 mg capsule Take 100 mg by mouth as needed for Constipation. Provider, Historical   carvedilol (COREG) 25 mg tablet Take 25 mg by mouth two (2) times daily (with meals).     Provider, Historical   ferrous sulfate 325 mg (65 mg iron) tablet Take 325 mg by mouth as needed. Provider, Historical        No Known Allergies    Review of Systems:   Omitted as patient is unable to cooperate. the patient in distress. Objective:     Vitals:    07/28/22 0231 07/28/22 0330 07/28/22 0355 07/28/22 0430   BP:  (!) 203/88 (!) 191/96 (!) 184/80   Pulse: (!) 115 (!) 109  98   Resp:       Temp:       SpO2: 95% 93%  98%   Weight:       Height:            Physical Exam:   General: alert, cooperative, in mod distress  Eye: Omitted as patient is unable to cooperate. Throat and Neck: Omitted as patient is unable to cooperate. Lung: decreased lung sounds on auscultation bilaterally  Heart: regular rate and rhythm,   Abdomen: soft, tender on right abdomen. Bowel sounds normal. No masses,  Extremities: 1+ LE swelling able to move all extremities normal, atraumatic  Skin: Normal.  Neurologic: Omitted as patient is unable to cooperate. Psychiatric: non focal    Recent Results (from the past 24 hour(s))   CBC WITH AUTOMATED DIFF    Collection Time: 07/27/22  3:32 PM   Result Value Ref Range    WBC 6.8 3.6 - 11.0 K/uL    RBC 2.86 (L) 3.80 - 5.20 M/uL    HGB 9.2 (L) 11.5 - 16.0 g/dL    HCT 28.6 (L) 35.0 - 47.0 %    .0 (H) 80.0 - 99.0 FL    MCH 32.2 26.0 - 34.0 PG    MCHC 32.2 30.0 - 36.5 g/dL    RDW 17.0 (H) 11.5 - 14.5 %    PLATELET 873 (L) 522 - 400 K/uL    MPV 13.5 (H) 8.9 - 12.9 FL    NRBC 0.0 0.0  WBC    ABSOLUTE NRBC 0.00 0.00 - 0.01 K/uL    NEUTROPHILS 78 (H) 32 - 75 %    LYMPHOCYTES 12 12 - 49 %    MONOCYTES 7 5 - 13 %    EOSINOPHILS 2 0 - 7 %    BASOPHILS 1 0 - 1 %    IMMATURE GRANULOCYTES 0 0 - 0.5 %    ABS. NEUTROPHILS 5.4 1.8 - 8.0 K/UL    ABS. LYMPHOCYTES 0.8 0.8 - 3.5 K/UL    ABS. MONOCYTES 0.4 0.0 - 1.0 K/UL    ABS. EOSINOPHILS 0.1 0.0 - 0.4 K/UL    ABS. BASOPHILS 0.1 0.0 - 0.1 K/UL    ABS. IMM.  GRANS. 0.0 0.00 - 0.04 K/UL    DF AUTOMATED     METABOLIC PANEL, COMPREHENSIVE    Collection Time: 07/27/22  3:32 PM   Result Value Ref Range Sodium 134 (L) 136 - 145 mmol/L    Potassium 7.2 (HH) 3.5 - 5.1 mmol/L    Chloride 100 97 - 108 mmol/L    CO2 20 (L) 21 - 32 mmol/L    Anion gap 14 5 - 15 mmol/L    Glucose 76 65 - 100 mg/dL    BUN 91 (H) 6 - 20 mg/dL    Creatinine 14.40 (H) 0.55 - 1.02 mg/dL    BUN/Creatinine ratio 6 (L) 12 - 20      GFR est AA 3 (L) >60 ml/min/1.73m2    GFR est non-AA 3 (L) >60 ml/min/1.73m2    Calcium 9.8 8.5 - 10.1 mg/dL    Bilirubin, total 0.7 0.2 - 1.0 mg/dL    AST (SGOT) 21 15 - 37 U/L    ALT (SGPT) 26 12 - 78 U/L    Alk. phosphatase 121 (H) 45 - 117 U/L    Protein, total 7.2 6.4 - 8.2 g/dL    Albumin 3.8 3.5 - 5.0 g/dL    Globulin 3.4 2.0 - 4.0 g/dL    A-G Ratio 1.1 1.1 - 2.2     TROPONIN-HIGH SENSITIVITY    Collection Time: 07/27/22  3:32 PM   Result Value Ref Range    Troponin-High Sensitivity 88 (H) 0 - 51 ng/L   NT-PRO BNP    Collection Time: 07/27/22  3:32 PM   Result Value Ref Range    NT pro-BNP >35,000 (H) <125 pg/mL   POTASSIUM    Collection Time: 07/27/22  5:20 PM   Result Value Ref Range    Potassium 7.2 (HH) 3.5 - 5.1 mmol/L   HEPATIC FUNCTION PANEL    Collection Time: 07/27/22  5:20 PM   Result Value Ref Range    Protein, total 7.1 6.4 - 8.2 g/dL    Albumin 3.7 3.5 - 5.0 g/dL    Globulin 3.4 2.0 - 4.0 g/dL    A-G Ratio 1.1 1.1 - 2.2      Bilirubin, total 0.7 0.2 - 1.0 mg/dL    Bilirubin, direct 0.3 (H) 0.0 - 0.2 mg/dL    Alk.  phosphatase 114 45 - 117 U/L    AST (SGOT) 20 15 - 37 U/L    ALT (SGPT) 25 12 - 78 U/L   LIPASE    Collection Time: 07/27/22  5:20 PM   Result Value Ref Range    Lipase 97 73 - 454 U/L   METABOLIC PANEL, BASIC    Collection Time: 07/27/22  8:45 PM   Result Value Ref Range    Sodium 139 136 - 145 mmol/L    Potassium 6.4 (H) 3.5 - 5.1 mmol/L    Chloride 103 97 - 108 mmol/L    CO2 23 21 - 32 mmol/L    Anion gap 13 5 - 15 mmol/L    Glucose 73 65 - 100 mg/dL    BUN 92 (H) 6 - 20 mg/dL    Creatinine 14.60 (H) 0.55 - 1.02 mg/dL    BUN/Creatinine ratio 6 (L) 12 - 20      GFR est AA 3 (L) >60 ml/min/1.73m2    GFR est non-AA 3 (L) >60 ml/min/1.73m2    Calcium 10.2 (H) 8.5 - 10.1 mg/dL       XR Results (maximum last 3): Results from Hospital Encounter encounter on 07/27/22    XR CHEST PORT    Narrative  Clinical history: chest pain  INDICATION:   chest pain  COMPARISON: 3/11/2022    FINDINGS:  AP portable upright view of the chest demonstrates a stable enlarged  cardiopericardial silhouette. Interstitial and hazy parenchymal opacities are  increased. Venous stents on the right right subclavian, SVC. [place] There is a cardiac  pacer. The patient is poststernotomy. Likely small left effusion. Patient is on  a cardiac monitor. Impression  Interstitial and parenchymal pulmonary edema with cardiomegaly. Results from [place] encounter on 03/11/22    XR CHEST PA LAT    Narrative  XR CHEST PA LAT    Comparison: Chest radiograph dated August 8, 2018    Impression  FINDINGS/IMPRESSION:  Lungs mildly hypoinflated, negative for consolidative pneumonia. Left chest wall ICD. Vascular stents noted. Cardiac silhouette enlarged, mild  hydrostatic edema. Bilateral shoulder. Senescent changes are present in the thoracic spine. CT Results (maximum last 3): Results from [place] encounter on 03/11/22    CT CHEST WO CONT    Narrative  TECHNIQUE: Multiple continuous axial images were obtained from the thoracic  inlet to the upper abdomen without administration of intravenous contrast.  Sagittal and coronal reformatted images, as well as axial maximum intensity  projection images were generated. Comment on dose reduction: All CT scans at this facility are performed using  dose reduction optimization technique as appropriate to perform the exam  including the following; automated exposure control, adjustments of the mA  and/or kV according to patient size, or use of iterative reconstructed  technique.     Comparison examination: Chest dated earlier same day    Findings:  Examination limited secondary to lack of intravenous contrast.    LYMPHADENOPATHY: There is no axillary, mediastinal, or hilar lymphadenopathy by  CT size criteria. CARDIOVASCULAR: Left chest wall ICD. Median sternotomy. Cardiomegaly. Three-vessel coronary artery calcifications are present. Calcified intimal  plaque is present in the thoracic aorta and branch vessels. Thoracic aorta  normal in caliber. There is evidence of pulmonary arterial hypertension  illustrated by the main pulmonary artery measuring 38 mm in cross section. LUNGS AND PLEURA: Bilateral pleural effusions, right greater than left in size. Patchy atelectasis throughout the mid to lower lung zones. Examination negative  for consolidative pneumonia. INCLUDED ABDOMEN: Intimal calcification present in the included abdominal aorta. There is cholelithiasis without evidence of acute cholecystitis. CHEST WALL: Degenerative changes are present throughout the thoracic spine. The  included skeleton is fairly sclerotic, this may represent sequela of renal  osteodystrophy. Impression  Bilateral pleural effusions, right greater than left in size. Patchy atelectasis  throughout the mid to lower lung zones. Examination negative for consolidative  pneumonia. MRI Results (maximum last 3): No results found for this or any previous visit. Nuclear Medicine Results (maximum last 3): Results from [facility] encounter on 08/12/13    NM [place] [address] Results (maximum last 3): No results found for this or any previous visit. Assessment and plan:   # Hyperkalemia  - May explain pain at multiple sites. - S/p 2 doses of kayelate, will recheck renal function test.   - Hemodialysis should also improves K level   - Consult nephrology     # ESRD  - missed hemodialysis yesterday.  - Nephrology for hemodialysis   - Continue home medications. # Flank pain  - Persistent despite improving K level   - CT abdomen to evaluate for possible kidney stone.      # Pulmonary edema  - Likely volume overload secondary to missed hemodialysis. - IV lasix x 1 dose now. Should improves tomorrow after hemodialysis. - Resume home dose PO lasix tomorrow. # Hypertension  - Continue home medications. - IV Labetalol PRN. # Diabetes  - Hold home lantus for now, BS low  - POC + correctional insulin     # CAD  - Continue Plavix. # Anemia  - stable, continue iron supplement.      # GERD  - PPI    # Full code by default, need further clarification    # Medication reconciliation incomplete, appreciate assistance from pharmacy or nursing staff      Signed By: Praneeth Rosa MD     July 28, 2022

## 2022-07-28 NOTE — ED NOTES
Assumed care of patient. Patient awake and alert; oriented to self, location, and president; pt states the year is 2002. No acute distress; no respiratory distress.

## 2022-07-28 NOTE — DIALYSIS
Pt had 4 hours of dialysis, tolerated well. Pt seen by nephrologist with no new orders given. Pt had 4L removed with rinseback.

## 2022-07-28 NOTE — CONSULTS
Nephrology Consult    Patient: Marciano Cruz MRN: [id number]  SSN: [id number]    YOB: 1955  Age: 75 y.o. Sex: female      Subjective:   Reason for the consultation. Medical management for end-stage renal disease/critical hyperkalemia and urgent need for dialysis. History of present illness: The patient is a 79-year-old woman with underlying history of ESRD on hemodialysis Monday Wednesday Friday at [address] renal care in [place], right upper arm AV fistula as a dialysis access, hypertension, diabetes mellitus type 2, diabetic retinopathy, legally blind was admitted with shortness of breath, chest x-ray showed pulmonary edema. On arrival her K was 7.2 medically treated in ER and repeat K was 6.4 last night medically treated and repeat K is 5.9 this morning. She is noncompliant with her dialysis treatment session and last dialyzed on 7/20. She is getting dialyzed now. She is tolerating well.     Past Medical History:   Diagnosis Date    Blind     CKD (chronic kidney disease)     HTN (hypertension)     Retinopathy     Type II or unspecified type diabetes mellitus with renal manifestations, uncontrolled(250.42) ([facility])      Past Surgical History:   Procedure Laterality Date    HX CORONARY ARTERY BYPASS GRAFT      HX GYN      hysterectomy       Family History   Problem Relation Age of Onset    Diabetes Maternal Aunt     Diabetes Maternal Grandmother      Social History     Tobacco Use    Smoking status: Never    Smokeless tobacco: Never   Substance Use Topics    Alcohol use: No      Current Facility-Administered Medications   Medication Dose Route Frequency Provider Last Rate Last Admin    amLODIPine (NORVASC) tablet 10 mg  10 mg Oral DAILY Michael Arnold MD        carvediloL (COREG) tablet 25 mg  25 mg Oral BID WITH MEALS Mariela Arnold MD        cholecalciferol (VITAMIN D3) (1000 Units /25 mcg) tablet 2,000 Units  2,000 Units Oral Cassidy Houston MD        [facility] [place] by provider] clopidogreL (PLAVIX) tablet 75 mg  75 mg Oral DAILY Michael Arnold MD        ferrous sulfate tablet 325 mg  325 mg Oral DAILY WITH BREAKFAST Joel Arnold MD        furosemide (LASIX) tablet 20 mg  20 mg Oral DAILY Joel Arnold MD        hydrALAZINE (APRESOLINE) tablet 25 mg  25 mg Oral TID Jessica Sandhu MD        [Held by provider] insulin glargine (LANTUS) injection 8 Units  8 Units SubCUTAneous DAILY Joel Arnold MD        pantoprazole (PROTONIX) tablet 40 mg  40 mg Oral ACB Joel Arnold MD        atorvastatin (LIPITOR) tablet 40 mg  40 mg Oral QHS Jessica Sandhu MD   40 mg at 07/28/22 0319    sevelamer carbonate (RENVELA) tab 800 mg  800 mg Oral TID WITH MEALS Michael Arnold MD        sodium chloride (NS) flush 5-40 mL  5-40 mL IntraVENous Q8H Joel Arnold MD        sodium chloride (NS) flush 5-40 mL  5-40 mL IntraVENous PRN Joel Arnold MD        acetaminophen (TYLENOL) tablet 650 mg  650 mg Oral Q6H PRN Joel Arnold MD        Or    acetaminophen (TYLENOL) suppository 650 mg  650 mg Rectal Q6H PRN Joel Arnold MD        polyethylene glycol (MIRALAX) packet 17 g  17 g Oral DAILY PRN Joel Arnold MD        ondansetron (ZOFRAN ODT) tablet 4 mg  4 mg Oral Q8H PRN Joel Arnold MD        Or    ondansetron (ZOFRAN) injection 4 mg  4 mg IntraVENous Q6H PRN Joel Arnold MD        heparin (porcine) injection 5,000 Units  5,000 Units SubCUTAneous Q8H Jessica Sandhu MD   5,000 Units at 07/28/22 8799    labetaloL (NORMODYNE;TRANDATE) 20 mg/4 mL (5 mg/mL) injection 10 mg  10 mg IntraVENous Q4H PRN Joel Arnold MD        glucose chewable tablet 16 g  4 Tablet Oral PRN Joel Arnold MD        glucagon (GLUCAGEN) injection 1 mg  1 mg IntraMUSCular PRN Joel Arnold MD        dextrose 10% infusion 0-250 mL  0-250 mL IntraVENous PRN Jessica Sandhu MD        insulin lispro (HUMALOG) injection   SubCUTAneous AC&HS Lisa Yoo MD         Current Outpatient Medications   Medication Sig Dispense Refill diclofenac (Voltaren Arthritis Pain) 1 % gel apply to foot and hands as needed for arthritis pain      cholecalciferol (Vitamin D3) (1000 Units /25 mcg) tablet Take 2,000 Units by mouth daily. Insulin Needles, Disposable, (Rupali Pen Needle) 32 gauge x 5/32\" ndle USE ONCE DAILY DX. CODE E11.65 100 Pen Needle 3    furosemide (LASIX) 20 mg tablet Take 20 mg by mouth daily. flash glucose scanning reader ([employer] 2 [place]) misc To use it for checking sugars (Patient not taking: Reported on 4/21/2022) 1 Each 0    rosuvastatin (CRESTOR) 20 mg tablet TAKE 1 TABLET NIGHTLY (STOP 40 MG DOSE) 90 Tablet 3    amLODIPine (NORVASC) 10 mg tablet Take 10 mg by mouth daily. clopidogreL (PLAVIX) 75 mg tab Take 75 mg by mouth daily. sacubitriL-valsartan (Entresto) 49-51 mg tab tablet Take 1 Tab by mouth two (2) times a day. insulin glargine (Lantus Solostar U-100 Insulin) 100 unit/mL (3 mL) inpn Inject 8 units daily. 15 mL 3    OTHER Liquid Protein      hydrALAZINE (APRESOLINE) 25 mg tablet Take 25 mg by mouth three (3) times daily. 1    sevelamer carbonate (RENVELA) 800 mg tab tab Take  by mouth three (3) times daily. FREESTYLE LITE STRIPS strip TEST FOUR TIMES DAILY 200 Strip 0    pantoprazole (PROTONIX) 40 mg tablet       FREESTYLE LANCETS 28 gauge misc       acetaminophen (TYLENOL EXTRA STRENGTH) 500 mg tablet Take  by mouth every six (6) hours as needed for Pain. Lancets (FREESTYLE LANCETS) misc Test 4 times daily. Dx code E11.65 200 Each 6    docusate sodium (COLACE) 100 mg capsule Take 100 mg by mouth as needed for Constipation. carvedilol (COREG) 25 mg tablet Take 25 mg by mouth two (2) times daily (with meals). ferrous sulfate 325 mg (65 mg iron) tablet Take 325 mg by mouth as needed. No Known Allergies    Review of Systems:  A comprehensive review of systems was negative except for that written in the History of Present Illness.     Objective:     Vitals: 07/28/22 0900 07/28/22 0930 07/28/22 1000 07/28/22 1030   BP: (!) 198/92 (!) 194/81 (!) 198/89 (!) 221/85   Pulse: 99 96 96 97   Resp: 16 16 14 16   Temp:       SpO2:       Weight:       Height:            Physical Exam:  General: NAD  Eyes: sclera anicteric  Oral Cavity: No thrush or ulcers  Neck: no JVD  Chest: Decreased bilateral air entry and basal crackles  Heart: normal sounds  Abdomen: soft and non tender   : no cohen  Lower Extremities: no edema  Skin: no rash  Neuro: intact  Psychiatric: non-depressed            Assessment:     Hospital Problems  Date Reviewed: 4/24/2022            Codes Class Noted POA    Hyperkalemia ICD-10-CM: E87.5  ICD-9-CM: 276.7  7/28/2022 Unknown           Plan:     1 ESRD. -On hemodialysis Monday Wednesday Friday at [address] [facility] renal care in [place].    -She was last dialyzed on 7/20 in her outpatient center.    -She came with volume overload/BUN 91 and creatinine 14.40 and K of 7.2.    -She is getting dialyzed today 4 hours.    -I will dialyze her tomorrow also. -She has right upper arm AV fistula is a dialysis access. 2.  Hyperkalemia. -Due to missed dialysis sessions.    -On admission K was 7.2.    -Medically treated twice in the ER with Kayexalate and K is 5.9 this morning.    -Plan to dialyze with 1K bath for first hour then 2K bath for the rest of the dialysis treatment. 3.  Hypertension.    -Malignant/accelerated. -Volume related and medical noncompliance also. -Blood pressure systolic was 452.    -Getting dialyzed with UF of 4 L.    -Plan to continue home blood pressure medications. 4.  Anemia.    -Hemoglobin 9.2. I will give erythropoietin IV with dialysis. 5.  Renal osteodystrophy.    -Her calcium is 10.2. I will check phosphorus level. I will continue phosphorus binders. Thank you for the consultation.   Signed By: Kristyn Rodríguez MD     July 28, 2022

## 2022-07-28 NOTE — CONSULTS
CONSULTATION    REASON FOR CONSULT:  Pre-operative risk assessment    REQUESTING PROVIDER:  Dr. Dean Martino:    Chief Complaint   Patient presents with    Chest Pain         HISTORY OF PRESENT ILLNESS:  Kitty Montero is a 75y.o. year-old female with past medical history significant for congestive heart failure, ischemic cardiomyopathy (Medtronic device in place), hypertension, hyperlipidemia, and  chronic kidney disease (dialysis patient) who was admitted to the hospital for further evaluation of chest pain, abdominal pain, and myalgias for the past 2 days. Patient reports missing a dialysis session. On presentation to the ED labs showed  a K 7.2, after emergent dialysis K is now 5.9. EKG showed sinus tachycardia with peaked T waves consistent with hyperkalemia. Patient was seen and examined in dialysis, she was tolerating session well with no new complaints. CTA showed dilated gallbladder, HIDA scan recommended for further evaluation. HS troponin 88 > 107. Records from hospital admission course thus far reviewed. Telemetry Review: No acute events noted since admission. PAST MEDICAL HISTORY:    Past Medical History:   Diagnosis Date    Blind     CKD (chronic kidney disease)     HTN (hypertension)     Retinopathy     Type II or unspecified type diabetes mellitus with renal manifestations, uncontrolled(250.42) ([facility] [address]        PAST SURGICAL HISTORY:   Past Surgical History:   Procedure Laterality Date    HX CORONARY ARTERY BYPASS GRAFT      HX GYN      hysterectomy        ALLERGIES:  No Known Allergies    FAMILY HISTORY:    Family History   Problem Relation Age of Onset    Diabetes Maternal Aunt     Diabetes Maternal Grandmother        SOCIAL HISTORY:    Tobacco: Never  Drugs: not documented  ETOH: not documented    HOME MEDICATIONS:    Prior to Admission Medications   Prescriptions Last Dose Informant Patient Reported? Taking?    FREESTYLE LANCETS 28 gauge misc   Yes No   FREESTYLE LITE STRIPS strip   No No   Sig: TEST FOUR TIMES DAILY   Insulin Needles, Disposable, (Rupali Pen Needle) 32 gauge x 5/32\" ndle   No No   Sig: USE ONCE DAILY DX. CODE E11.65   Lancets (FREESTYLE LANCETS) [facility]   No No   Sig: Test 4 times daily. Dx code E11.65   OTHER   Yes No   Sig: Liquid Protein   acetaminophen (TYLENOL EXTRA STRENGTH) 500 mg tablet   Yes No   Sig: Take  by mouth every six (6) hours as needed for Pain. amLODIPine (NORVASC) 10 mg tablet   Yes No   Sig: Take 10 mg by mouth daily. carvedilol (COREG) 25 mg tablet   Yes No   Sig: Take 25 mg by mouth two (2) times daily (with meals). cholecalciferol (Vitamin D3) (1000 Units /25 mcg) tablet   Yes No   Sig: Take 2,000 Units by mouth daily. clopidogreL (PLAVIX) 75 mg tab   Yes No   Sig: Take 75 mg by mouth daily. diclofenac (Voltaren Arthritis Pain) 1 % gel   Yes No   Sig: apply to foot and hands as needed for arthritis pain   docusate sodium (COLACE) 100 mg capsule   Yes No   Sig: Take 100 mg by mouth as needed for Constipation. ferrous sulfate 325 mg (65 mg iron) tablet   Yes No   Sig: Take 325 mg by mouth as needed. flash glucose scanning reader (FreeStyle Chance 2 [place]) misc   No No   Sig: To use it for checking sugars   Patient not taking: Reported on 4/21/2022   furosemide (LASIX) 20 mg tablet   Yes No   Sig: Take 20 mg by mouth daily. hydrALAZINE (APRESOLINE) 25 mg tablet   Yes No   Sig: Take 25 mg by mouth three (3) times daily. insulin glargine (Lantus Solostar U-100 Insulin) 100 unit/mL (3 mL) inpn   No No   Sig: Inject 8 units daily. pantoprazole (PROTONIX) 40 mg tablet   Yes No   rosuvastatin (CRESTOR) 20 mg tablet   No No   Sig: TAKE 1 TABLET NIGHTLY (STOP 40 MG DOSE)   sacubitriL-valsartan (Entresto) 49-51 mg tab tablet   Yes No   Sig: Take 1 Tab by mouth two (2) times a day. sevelamer carbonate (RENVELA) 800 mg tab tab   Yes No   Sig: Take  by mouth three (3) times daily.       Facility-Administered Medications: None REVIEW OF SYSTEMS:  Complete review of systems performed, pertinents noted above, all other systems are negative. Patient Vitals for the past 24 hrs:   Temp Pulse Resp BP SpO2   07/28/22 1230 -- 99 18 (!) 197/88 --   07/28/22 1200 -- (!) 101 18 (!) 210/90 --   07/28/22 1130 -- 97 16 (!) 199/88 --   07/28/22 1100 -- 92 16 (!) 180/83 --   07/28/22 1030 -- 97 16 (!) 221/85 --   07/28/22 1000 -- 96 14 (!) 198/89 --   07/28/22 0930 -- 96 16 (!) 194/81 --   07/28/22 0900 -- 99 16 (!) 198/92 --   07/28/22 0830 -- (!) 102 -- (!) 214/94 --   07/28/22 0600 -- 95 -- (!) 156/71 100 %   07/28/22 0430 -- 98 -- (!) 184/80 98 %   07/28/22 0355 -- -- -- (!) 191/96 --   07/28/22 0330 -- (!) 109 -- (!) 203/88 93 %   07/28/22 0231 -- (!) 115 -- -- 95 %   07/27/22 2306 -- (!) 112 19 (!) 178/88 95 %   07/27/22 1525 97.9 °F (36.6 °C) (!) 111 19 (!) 180/92 98 %       PHYSICAL EXAMINATION:    General: appears chronically ill, NAD, A&O  HEENT: Normocephalic, PERRL, no drainage  Neck: Supple, Trachea midline, No JVD  RESP: CTA bilaterally. + Symmetrical chest movement. No SOB or distress. On O2 per protocol  Cardiovascular: RRR no MRG  PVS: No rubor, cyanosis, no edema,   ABD:,soft, NT, Normoactive BS  Derm: Warm/Dry/Intact with no lesions,  Neuro: A&O PPTS, . No focal deficits  PSYCH: No anxiety or agitation    Recent labs results and imaging reviewed.       Recent Results (from the past 24 hour(s))   EKG, 12 LEAD, INITIAL    Collection Time: 07/27/22  3:26 PM   Result Value Ref Range    Ventricular Rate 110 BPM    Atrial Rate 110 BPM    P-R Interval 174 ms    QRS Duration 106 ms    Q-T Interval 356 ms    QTC Calculation (Bezet) 481 ms    Calculated P Axis 53 degrees    Calculated R Axis 40 degrees    Calculated T Axis 77 degrees    Diagnosis       Sinus tachycardia  Peaked T waves are new    Confirmed by Ciro Renee MD, Yaya Rollins ([id number]) on 7/28/2022 8:21:39 AM     CBC WITH AUTOMATED DIFF    Collection Time: 07/27/22  3:32 PM   Result Value Ref Range    WBC 6.8 3.6 - 11.0 K/uL    RBC 2.86 (L) 3.80 - 5.20 M/uL    HGB 9.2 (L) 11.5 - 16.0 g/dL    HCT 28.6 (L) 35.0 - 47.0 %    .0 (H) 80.0 - 99.0 FL    MCH 32.2 26.0 - 34.0 PG    MCHC 32.2 30.0 - 36.5 g/dL    RDW 17.0 (H) 11.5 - 14.5 %    PLATELET 836 (L) 888 - 400 K/uL    MPV 13.5 (H) 8.9 - 12.9 FL    NRBC 0.0 0.0  WBC    ABSOLUTE NRBC 0.00 0.00 - 0.01 K/uL    NEUTROPHILS 78 (H) 32 - 75 %    LYMPHOCYTES 12 12 - 49 %    MONOCYTES 7 5 - 13 %    EOSINOPHILS 2 0 - 7 %    BASOPHILS 1 0 - 1 %    IMMATURE GRANULOCYTES 0 0 - 0.5 %    ABS. NEUTROPHILS 5.4 1.8 - 8.0 K/UL    ABS. LYMPHOCYTES 0.8 0.8 - 3.5 K/UL    ABS. MONOCYTES 0.4 0.0 - 1.0 K/UL    ABS. EOSINOPHILS 0.1 0.0 - 0.4 K/UL    ABS. BASOPHILS 0.1 0.0 - 0.1 K/UL    ABS. IMM. GRANS. 0.0 0.00 - 0.04 K/UL    DF AUTOMATED     METABOLIC PANEL, COMPREHENSIVE    Collection Time: 07/27/22  3:32 PM   Result Value Ref Range    Sodium 134 (L) 136 - 145 mmol/L    Potassium 7.2 (HH) 3.5 - 5.1 mmol/L    Chloride 100 97 - 108 mmol/L    CO2 20 (L) 21 - 32 mmol/L    Anion gap 14 5 - 15 mmol/L    Glucose 76 65 - 100 mg/dL    BUN 91 (H) 6 - 20 mg/dL    Creatinine 14.40 (H) 0.55 - 1.02 mg/dL    BUN/Creatinine ratio 6 (L) 12 - 20      GFR est AA 3 (L) >60 ml/min/1.73m2    GFR est non-AA 3 (L) >60 ml/min/1.73m2    Calcium 9.8 8.5 - 10.1 mg/dL    Bilirubin, total 0.7 0.2 - 1.0 mg/dL    AST (SGOT) 21 15 - 37 U/L    ALT (SGPT) 26 12 - 78 U/L    Alk.  phosphatase 121 (H) 45 - 117 U/L    Protein, total 7.2 6.4 - 8.2 g/dL    Albumin 3.8 3.5 - 5.0 g/dL    Globulin 3.4 2.0 - 4.0 g/dL    A-G Ratio 1.1 1.1 - 2.2     TROPONIN-HIGH SENSITIVITY    Collection Time: 07/27/22  3:32 PM   Result Value Ref Range    Troponin-High Sensitivity 88 (H) 0 - 51 ng/L   NT-PRO BNP    Collection Time: 07/27/22  3:32 PM   Result Value Ref Range    NT pro-BNP >35,000 (H) <125 pg/mL   POTASSIUM    Collection Time: 07/27/22  5:20 PM   Result Value Ref Range    Potassium 7.2 (HH) 3.5 - 5.1 mmol/L HEPATIC FUNCTION PANEL    Collection Time: 07/27/22  5:20 PM   Result Value Ref Range    Protein, total 7.1 6.4 - 8.2 g/dL    Albumin 3.7 3.5 - 5.0 g/dL    Globulin 3.4 2.0 - 4.0 g/dL    A-G Ratio 1.1 1.1 - 2.2      Bilirubin, total 0.7 0.2 - 1.0 mg/dL    Bilirubin, direct 0.3 (H) 0.0 - 0.2 mg/dL    Alk.  phosphatase 114 45 - 117 U/L    AST (SGOT) 20 15 - 37 U/L    ALT (SGPT) 25 12 - 78 U/L   LIPASE    Collection Time: 07/27/22  5:20 PM   Result Value Ref Range    Lipase 97 73 - 340 U/L   METABOLIC PANEL, BASIC    Collection Time: 07/27/22  8:45 PM   Result Value Ref Range    Sodium 139 136 - 145 mmol/L    Potassium 6.4 (H) 3.5 - 5.1 mmol/L    Chloride 103 97 - 108 mmol/L    CO2 23 21 - 32 mmol/L    Anion gap 13 5 - 15 mmol/L    Glucose 73 65 - 100 mg/dL    BUN 92 (H) 6 - 20 mg/dL    Creatinine 14.60 (H) 0.55 - 1.02 mg/dL    BUN/Creatinine ratio 6 (L) 12 - 20      GFR est AA 3 (L) >60 ml/min/1.73m2    GFR est non-AA 3 (L) >60 ml/min/1.73m2    Calcium 10.2 (H) 8.5 - 10.1 mg/dL   TROPONIN-HIGH SENSITIVITY    Collection Time: 07/28/22  3:23 AM   Result Value Ref Range    Troponin-High Sensitivity 107 (H) 0 - 51 ng/L   GLUCOSE, POC    Collection Time: 07/28/22  7:06 AM   Result Value Ref Range    Glucose (POC) 86 65 - 117 mg/dL    Performed by Andreas Verde    RENAL FUNCTION PANEL    Collection Time: 07/28/22  8:30 AM   Result Value Ref Range    Sodium 139 136 - 145 mmol/L    Potassium 5.9 (H) 3.5 - 5.1 mmol/L    Chloride 103 97 - 108 mmol/L    CO2 22 21 - 32 mmol/L    Anion gap 14 5 - 15 mmol/L    Glucose 100 65 - 100 mg/dL    BUN 95 (H) 6 - 20 mg/dL    Creatinine 14.60 (H) 0.55 - 1.02 mg/dL    BUN/Creatinine ratio 7 (L) 12 - 20      GFR est AA 3 (L) >60 ml/min/1.73m2    GFR est non-AA 3 (L) >60 ml/min/1.73m2    Calcium 9.7 8.5 - 10.1 mg/dL    Phosphorus 4.6 2.6 - 4.7 mg/dL    Albumin 3.6 3.5 - 5.0 g/dL   METABOLIC PANEL, BASIC    Collection Time: 07/28/22  8:30 AM   Result Value Ref Range    Sodium 139 136 - 145 mmol/L    Potassium 5.9 (H) 3.5 - 5.1 mmol/L    Chloride 103 97 - 108 mmol/L    CO2 23 21 - 32 mmol/L    Anion gap 13 5 - 15 mmol/L    Glucose 99 65 - 100 mg/dL    BUN 94 (H) 6 - 20 mg/dL    Creatinine 14.80 (H) 0.55 - 1.02 mg/dL    BUN/Creatinine ratio 6 (L) 12 - 20      GFR est AA 3 (L) >60 ml/min/1.73m2    GFR est non-AA 2 (L) >60 ml/min/1.73m2    Calcium 9.8 8.5 - 10.1 mg/dL       XR Results (maximum last 3): Results from [place] encounter on 07/27/22    XR ABD (KUB)    Impression  1. Cardiomegaly. Pulmonary vascular congestion without overt interstitial edema. 2. Normal bowel gas pattern. Lobulated, retracted abdominal wall. This should be  readily evident on physical examination. XR CHEST PORT    Impression  Interstitial and parenchymal pulmonary edema with cardiomegaly. Results from [place] encounter on 03/11/22    XR CHEST PA LAT    Impression  FINDINGS/IMPRESSION:  Lungs mildly hypoinflated, negative for consolidative pneumonia. Left chest wall ICD. Vascular stents noted. Cardiac silhouette enlarged, mild  hydrostatic edema. Bilateral shoulder. Senescent changes are present in the thoracic spine. CT Results (maximum last 3): Results from [place] encounter on 07/27/22    CT ABD PELV WO CONT    Impression  1. Massively dilated gallbladder extends below the sacral promontory. Hepatobiliary scintigraphy (\"HIDA scan\") is recommended to differentiate acute  cholecystitis from a hydropic gallbladder. 2. Cardiomegaly. Small bilateral pleural effusions. The findings were called to Dr. Reggie Srivastava on 7/28/2022 at 0703 hours by Dr. Emelia Saucedo. [phone]      Results from [place] encounter on 03/11/22    CT CHEST WO CONT    Impression  Bilateral pleural effusions, right greater than left in size. Patchy atelectasis  throughout the mid to lower lung zones. Examination negative for consolidative  pneumonia. MRI Results (maximum last 3):   No results found for this or any previous visit. Nuclear Medicine Results (maximum last 3): Results from [place] encounter on 08/12/13    NM [address] Results (maximum last 3): No results found for this or any previous visit. VAS/US Results (maximum last 3): No results found for this or any previous visit.         Current Facility-Administered Medications:     amLODIPine (NORVASC) tablet 10 mg, 10 mg, Oral, DAILY, Michael Arnold MD    carvediloL (COREG) tablet 25 mg, 25 mg, Oral, BID WITH MEALS, Julian Arnold MD    cholecalciferol (VITAMIN D3) (1000 Units /25 mcg) tablet 2,000 Units, 2,000 Units, Oral, DAILY, Julian Arnold MD    [Held by provider] clopidogreL (PLAVIX) tablet 75 mg, 75 mg, Oral, DAILY, Michael Arnold MD    ferrous sulfate tablet 325 mg, 325 mg, Oral, DAILY WITH Christoph HoguetJulian MD    furosemide (LASIX) tablet 20 mg, 20 mg, Oral, DAILY, Julian Arnold MD    hydrALAZINE (APRESOLINE) tablet 25 mg, 25 mg, Oral, TID, Daisy Díaz MD    [Held by provider] insulin glargine (LANTUS) injection 8 Units, 8 Units, SubCUTAneous, DAILY, Michael Arnold MD    pantoprazole (PROTONIX) tablet 40 mg, 40 mg, Oral, ACB, Julian Arnold MD    atorvastatin (LIPITOR) tablet 40 mg, 40 mg, Oral, QHS, Julian Arnold MD, 40 mg at 07/28/22 0319    sodium chloride (NS) flush 5-40 mL, 5-40 mL, IntraVENous, Q8H, Michael Arnold MD    sodium chloride (NS) flush 5-40 mL, 5-40 mL, IntraVENous, PRN, Daisy Díaz MD    acetaminophen (TYLENOL) tablet 650 mg, 650 mg, Oral, Q6H PRN **OR** acetaminophen (TYLENOL) suppository 650 mg, 650 mg, Rectal, Q6H PRN, Daisy Díaz MD    polyethylene glycol (MIRALAX) packet 17 g, 17 g, Oral, DAILY PRN, Julian Arnold MD    ondansetron (ZOFRAN ODT) tablet 4 mg, 4 mg, Oral, Q8H PRN **OR** ondansetron (ZOFRAN) injection 4 mg, 4 mg, IntraVENous, Q6H PRN, Daisy Díaz MD    heparin (porcine) injection 5,000 Units, 5,000 Units, SubCUTAneous, Q8H, [place], Julian Graham MD, 5,000 Units at 07/28/22 0705    labetaloL (NORMODYNE;TRANDATE) 20 mg/4 mL (5 mg/mL) injection 10 mg, 10 mg, IntraVENous, Q4H PRN, Tammy Arnold MD    glucose chewable tablet 16 g, 4 Tablet, Oral, PRN, Tammy Arnold MD    glucagon (GLUCAGEN) injection 1 mg, 1 mg, IntraMUSCular, PRN, Tammy Arnold MD    dextrose 10% infusion 0-250 mL, 0-250 mL, IntraVENous, PRN, Tammy Arnold MD    insulin lispro (HUMALOG) injection, , SubCUTAneous, AC&HS, Sundeep Alves MD    sevelamer carbonate (RENVELA) tab 800 mg, 800 mg, Oral, TID WITH MEALS, Lana Fuentes MD    Current Outpatient Medications:     diclofenac (Voltaren Arthritis Pain) 1 % gel, apply to foot and hands as needed for arthritis pain, Disp: , Rfl:     cholecalciferol (Vitamin D3) (1000 Units /25 mcg) tablet, Take 2,000 Units by mouth daily. , Disp: , Rfl:     Insulin Needles, Disposable, (Rupali Pen Needle) 32 gauge x 5/32\" ndle, USE ONCE DAILY DX. CODE E11.65, Disp: 100 Pen Needle, Rfl: 3    furosemide (LASIX) 20 mg tablet, Take 20 mg by mouth daily. , Disp: , Rfl:     flash glucose scanning reader (FreeStyle Chance 2 [place]) misc, To use it for checking sugars (Patient not taking: Reported on 4/21/2022), Disp: 1 Each, Rfl: 0    rosuvastatin (CRESTOR) 20 mg tablet, TAKE 1 TABLET NIGHTLY (STOP 40 MG DOSE), Disp: 90 Tablet, Rfl: 3    amLODIPine (NORVASC) 10 mg tablet, Take 10 mg by mouth daily. , Disp: , Rfl:     clopidogreL (PLAVIX) 75 mg tab, Take 75 mg by mouth daily. , Disp: , Rfl:     sacubitriL-valsartan (Entresto) 49-51 mg tab tablet, Take 1 Tab by mouth two (2) times a day., Disp: , Rfl:     insulin glargine (Lantus Solostar U-100 Insulin) 100 unit/mL (3 mL) inpn, Inject 8 units daily. , Disp: 15 mL, Rfl: 3    OTHER, Liquid Protein, Disp: , Rfl:     hydrALAZINE (APRESOLINE) 25 mg tablet, Take 25 mg by mouth three (3) times daily. , Disp: , Rfl: 1    sevelamer carbonate (RENVELA) 800 mg tab tab, Take  by mouth three (3) times daily. , Disp: , Rfl:     FREESTYLE LITE STRIPS strip, TEST FOUR TIMES DAILY, Disp: 200 Strip, Rfl: 0    pantoprazole (PROTONIX) 40 mg tablet, , Disp: , Rfl:     FREESTYLE LANCETS 28 gauge misc, , Disp: , Rfl:     acetaminophen (TYLENOL EXTRA STRENGTH) 500 mg tablet, Take  by mouth every six (6) hours as needed for Pain., Disp: , Rfl:     Lancets (FREESTYLE LANCETS) misc, Test 4 times daily. Dx code E11.65, Disp: 200 Each, Rfl: 6    docusate sodium (COLACE) 100 mg capsule, Take 100 mg by mouth as needed for Constipation. , Disp: , Rfl:     carvedilol (COREG) 25 mg tablet, Take 25 mg by mouth two (2) times daily (with meals). , Disp: , Rfl:     ferrous sulfate 325 mg (65 mg iron) tablet, Take 325 mg by mouth as needed. , Disp: , Rfl:     Case discussed with collaborating physician Dr. Anibal Leavitt and our impression and recommendations are as follows:     Pre-operative risk assessment:   - patient presented to the hospital after missing a dialysis session, complaining of RLQ pain, CT scan showed dilated gallbladder, HIDA scan is pending further evaluation. Surgery consult pending.  - Patient with  a significant cardiac history, will be considered moderate to high risk for any potential surgery.   - Recommend echo and NMST for further evaluation.   - NMST scheduled for 7/29, patient to be NPO at midnight. - Please hold  AM Coreg dose on 07/29    2. Coronary artery disease:   - history of CABG in 2013  - continue statin, BB    3. Hypertension:   - blood pressure above goal before dialysis session  - 4L removed during 4 hours dialysis session, patient tolerated well  - continue current medications    4. Ischemic cardiomyopathy  - device in place - Medtronic placed in 2013  - request interrogation    5. Chronic kidney disease:   - managed per nephrology. Appreciate recommendations. - Dialysis M-W-Fr  - presented with hyperkalemia initial K 7.2, EKG showed peaked T waves. Thank you for involving us in the care of this patient.   Please do not hesitate to call if additional questions arise. If after hours please call [phone]. Dr. Garth Ramirez Cardiology  [address] [place].    [address], [address] [facility]  [phone]

## 2022-07-29 ENCOUNTER — APPOINTMENT (OUTPATIENT)
Dept: NON INVASIVE DIAGNOSTICS | Age: 67
DRG: 640 | End: 2022-07-29
Attending: NURSE PRACTITIONER
Payer: MEDICARE

## 2022-07-29 ENCOUNTER — APPOINTMENT (OUTPATIENT)
Dept: NUCLEAR MEDICINE | Age: 67
DRG: 640 | End: 2022-07-29
Attending: NURSE PRACTITIONER
Payer: MEDICARE

## 2022-07-29 LAB
ALBUMIN SERPL-MCNC: 3.4 G/DL (ref 3.5–5)
ALBUMIN SERPL-MCNC: 3.4 G/DL (ref 3.5–5)
ALBUMIN/GLOB SERPL: 1.1 {RATIO} (ref 1.1–2.2)
ALP SERPL-CCNC: 116 U/L (ref 45–117)
ALT SERPL-CCNC: 31 U/L (ref 12–78)
ANION GAP SERPL CALC-SCNC: 10 MMOL/L (ref 5–15)
ANION GAP SERPL CALC-SCNC: 10 MMOL/L (ref 5–15)
AST SERPL W P-5'-P-CCNC: 23 U/L (ref 15–37)
ATRIAL RATE: 92 BPM
BASOPHILS # BLD: 0.1 K/UL (ref 0–0.1)
BASOPHILS NFR BLD: 1 % (ref 0–1)
BILIRUB SERPL-MCNC: 0.8 MG/DL (ref 0.2–1)
BUN SERPL-MCNC: 41 MG/DL (ref 6–20)
BUN SERPL-MCNC: 41 MG/DL (ref 6–20)
BUN/CREAT SERPL: 4 (ref 12–20)
BUN/CREAT SERPL: 4 (ref 12–20)
CA-I BLD-MCNC: 9.2 MG/DL (ref 8.5–10.1)
CA-I BLD-MCNC: 9.2 MG/DL (ref 8.5–10.1)
CALCULATED P AXIS, ECG09: 77 DEGREES
CALCULATED R AXIS, ECG10: 78 DEGREES
CALCULATED T AXIS, ECG11: 77 DEGREES
CHLORIDE SERPL-SCNC: 100 MMOL/L (ref 97–108)
CHLORIDE SERPL-SCNC: 101 MMOL/L (ref 97–108)
CO2 SERPL-SCNC: 26 MMOL/L (ref 21–32)
CO2 SERPL-SCNC: 26 MMOL/L (ref 21–32)
CREAT SERPL-MCNC: 9.18 MG/DL (ref 0.55–1.02)
CREAT SERPL-MCNC: 9.27 MG/DL (ref 0.55–1.02)
DIAGNOSIS, 93000: NORMAL
DIFFERENTIAL METHOD BLD: ABNORMAL
EOSINOPHIL # BLD: 0.5 K/UL (ref 0–0.4)
EOSINOPHIL NFR BLD: 8 % (ref 0–7)
ERYTHROCYTE [DISTWIDTH] IN BLOOD BY AUTOMATED COUNT: 16.4 % (ref 11.5–14.5)
GLOBULIN SER CALC-MCNC: 3.1 G/DL (ref 2–4)
GLUCOSE BLD STRIP.AUTO-MCNC: 125 MG/DL (ref 65–117)
GLUCOSE SERPL-MCNC: 85 MG/DL (ref 65–100)
GLUCOSE SERPL-MCNC: 87 MG/DL (ref 65–100)
HCT VFR BLD AUTO: 28.7 % (ref 35–47)
HGB BLD-MCNC: 9.4 G/DL (ref 11.5–16)
IMM GRANULOCYTES # BLD AUTO: 0 K/UL (ref 0–0.04)
IMM GRANULOCYTES NFR BLD AUTO: 0 % (ref 0–0.5)
LYMPHOCYTES # BLD: 1 K/UL (ref 0.8–3.5)
LYMPHOCYTES NFR BLD: 15 % (ref 12–49)
MAGNESIUM SERPL-MCNC: 2.4 MG/DL (ref 1.6–2.4)
MCH RBC QN AUTO: 32.5 PG (ref 26–34)
MCHC RBC AUTO-ENTMCNC: 32.8 G/DL (ref 30–36.5)
MCV RBC AUTO: 99.3 FL (ref 80–99)
MONOCYTES # BLD: 0.7 K/UL (ref 0–1)
MONOCYTES NFR BLD: 11 % (ref 5–13)
NEUTS SEG # BLD: 4.1 K/UL (ref 1.8–8)
NEUTS SEG NFR BLD: 65 % (ref 32–75)
NRBC # BLD: 0 K/UL (ref 0–0.01)
NRBC BLD-RTO: 0 PER 100 WBC
NUC REST EJECTION FRACTION: 30 %
NUC STRESS EJECTION FRACTION: 30 %
P-R INTERVAL, ECG05: 140 MS
PERFORMED BY, TECHID: ABNORMAL
PHOSPHATE SERPL-MCNC: 4.3 MG/DL (ref 2.6–4.7)
PLATELET # BLD AUTO: 127 K/UL (ref 150–400)
PMV BLD AUTO: 12.8 FL (ref 8.9–12.9)
POTASSIUM SERPL-SCNC: 4.3 MMOL/L (ref 3.5–5.1)
POTASSIUM SERPL-SCNC: 4.3 MMOL/L (ref 3.5–5.1)
PROT SERPL-MCNC: 6.5 G/DL (ref 6.4–8.2)
Q-T INTERVAL, ECG07: 396 MS
QRS DURATION, ECG06: 88 MS
QTC CALCULATION (BEZET), ECG08: 489 MS
RBC # BLD AUTO: 2.89 M/UL (ref 3.8–5.2)
SODIUM SERPL-SCNC: 136 MMOL/L (ref 136–145)
SODIUM SERPL-SCNC: 137 MMOL/L (ref 136–145)
STRESS BASELINE DIAS BP: 74 MMHG
STRESS BASELINE HR: 78 BPM
STRESS BASELINE SYS BP: 188 MMHG
STRESS PERCENT HR ACHIEVED: 85 %
STRESS POST PEAK HR: 129 BPM
STRESS STAGE 1 DURATION: NORMAL MIN:SEC
STRESS STAGE 1 HR: 101 BPM
STRESS STAGE 2 BP: NORMAL MMHG
STRESS STAGE 2 DURATION: NORMAL MIN:SEC
STRESS STAGE 2 HR: 96 BPM
STRESS STAGE 3 DURATION: NORMAL MIN:SEC
STRESS STAGE 3 HR: 98 BPM
STRESS STAGE 4 BP: NORMAL MMHG
STRESS STAGE 4 DURATION: NORMAL MIN:SEC
STRESS STAGE 4 HR: 97 BPM
STRESS STAGE 5 DURATION: NORMAL MIN:SEC
STRESS STAGE 5 HR: 88 BPM
STRESS TARGET HR: 152 BPM
VENTRICULAR RATE, ECG03: 92 BPM
WBC # BLD AUTO: 6.3 K/UL (ref 3.6–11)

## 2022-07-29 PROCEDURE — 36415 COLL VENOUS BLD VENIPUNCTURE: CPT

## 2022-07-29 PROCEDURE — 77010033678 HC OXYGEN DAILY

## 2022-07-29 PROCEDURE — 74011250636 HC RX REV CODE- 250/636: Performed by: INTERNAL MEDICINE

## 2022-07-29 PROCEDURE — 99232 SBSQ HOSP IP/OBS MODERATE 35: CPT | Performed by: SURGERY

## 2022-07-29 PROCEDURE — 93005 ELECTROCARDIOGRAM TRACING: CPT

## 2022-07-29 PROCEDURE — 74011250637 HC RX REV CODE- 250/637: Performed by: INTERNAL MEDICINE

## 2022-07-29 PROCEDURE — 82962 GLUCOSE BLOOD TEST: CPT

## 2022-07-29 PROCEDURE — 74011250637 HC RX REV CODE- 250/637: Performed by: NURSE PRACTITIONER

## 2022-07-29 PROCEDURE — 65270000029 HC RM PRIVATE

## 2022-07-29 PROCEDURE — A9500 TC99M SESTAMIBI: HCPCS

## 2022-07-29 PROCEDURE — 85025 COMPLETE CBC W/AUTO DIFF WBC: CPT

## 2022-07-29 PROCEDURE — 90935 HEMODIALYSIS ONE EVALUATION: CPT

## 2022-07-29 PROCEDURE — 83735 ASSAY OF MAGNESIUM: CPT

## 2022-07-29 PROCEDURE — 74011000250 HC RX REV CODE- 250: Performed by: INTERNAL MEDICINE

## 2022-07-29 PROCEDURE — 74011250636 HC RX REV CODE- 250/636

## 2022-07-29 PROCEDURE — 80069 RENAL FUNCTION PANEL: CPT

## 2022-07-29 PROCEDURE — 5A1D70Z PERFORMANCE OF URINARY FILTRATION, INTERMITTENT, LESS THAN 6 HOURS PER DAY: ICD-10-PCS | Performed by: INTERNAL MEDICINE

## 2022-07-29 PROCEDURE — 80053 COMPREHEN METABOLIC PANEL: CPT

## 2022-07-29 RX ORDER — HYDRALAZINE HYDROCHLORIDE 50 MG/1
50 TABLET, FILM COATED ORAL 3 TIMES DAILY
Status: DISCONTINUED | OUTPATIENT
Start: 2022-07-29 | End: 2022-07-31 | Stop reason: HOSPADM

## 2022-07-29 RX ORDER — ISOSORBIDE DINITRATE 10 MG/1
10 TABLET ORAL 3 TIMES DAILY
Status: DISCONTINUED | OUTPATIENT
Start: 2022-07-29 | End: 2022-07-31 | Stop reason: HOSPADM

## 2022-07-29 RX ADMIN — ACETAMINOPHEN 650 MG: 325 TABLET ORAL at 18:43

## 2022-07-29 RX ADMIN — HEPARIN SODIUM 5000 UNITS: 5000 INJECTION INTRAVENOUS; SUBCUTANEOUS at 05:25

## 2022-07-29 RX ADMIN — HYDRALAZINE HYDROCHLORIDE 50 MG: 50 TABLET, FILM COATED ORAL at 18:43

## 2022-07-29 RX ADMIN — HEPARIN SODIUM 5000 UNITS: 5000 INJECTION INTRAVENOUS; SUBCUTANEOUS at 22:08

## 2022-07-29 RX ADMIN — SODIUM CHLORIDE, PRESERVATIVE FREE 10 ML: 5 INJECTION INTRAVENOUS at 22:10

## 2022-07-29 RX ADMIN — ATORVASTATIN CALCIUM 40 MG: 40 TABLET, FILM COATED ORAL at 22:08

## 2022-07-29 RX ADMIN — CARVEDILOL 25 MG: 12.5 TABLET, FILM COATED ORAL at 18:43

## 2022-07-29 RX ADMIN — ISOSORBIDE DINITRATE 10 MG: 10 TABLET ORAL at 22:08

## 2022-07-29 RX ADMIN — REGADENOSON 0.4 MG: 0.08 INJECTION, SOLUTION INTRAVENOUS at 11:01

## 2022-07-29 RX ADMIN — HYDRALAZINE HYDROCHLORIDE 50 MG: 50 TABLET, FILM COATED ORAL at 22:08

## 2022-07-29 RX ADMIN — SODIUM CHLORIDE, PRESERVATIVE FREE 10 ML: 5 INJECTION INTRAVENOUS at 05:26

## 2022-07-29 RX ADMIN — ISOSORBIDE DINITRATE 10 MG: 10 TABLET ORAL at 18:43

## 2022-07-29 RX ADMIN — SEVELAMER CARBONATE 800 MG: 800 TABLET, FILM COATED ORAL at 18:43

## 2022-07-29 NOTE — PROGRESS NOTES
Reason for Admission:  n, v, abdominal pain                     RUR Score:        17%             Plan for utilizing home health:      none at this time    PCP: First and Last name:  Mya Montelongo MD     Name of Practice:    Are you a current patient: Yes/No: yes   Approximate date of last visit: 3 weeks ago   Can you participate in a virtual visit with your PCP:                     Current Advanced Directive/Advance Care Plan: Full Code      Healthcare Decision Maker:   Click here to complete [address] including selection of the Healthcare Decision Maker Relationship (ie \"Primary\")             Primary Decision Maker: [facility] - [phone]                  Transition of Care Plan:                    Patient lives with her  in a one story home with 4 steps to entrance. Patient has a rolling walker, cane, wheelchair. Patient webber a left chest wall dialysis cath. Patient receives dialysis at [address] renal.     Patient has not had HH, SNF or IRF services. Patient uses [facility] in [address] [facility] for her scripts.

## 2022-07-29 NOTE — PROGRESS NOTES
Dual skin assessment performed with Tabatha Allen RN. No open wounds noted. Skin is clean, dry, and intact. Pt does have fistula in R forearm.

## 2022-07-29 NOTE — PROGRESS NOTES
Problem: Pressure Injury - Risk of  Goal: *Prevention of pressure injury  Description: Document Storm Scale and appropriate interventions in the flowsheet. Outcome: Progressing Towards Goal  Note: Pressure Injury Interventions:     Moisture Interventions: Absorbent underpads, Apply protective barrier, creams and emollients, Minimize layers    Activity Interventions: Chair cushion, PT/OT evaluation    Mobility Interventions: PT/OT evaluation    Nutrition Interventions: Document food/fluid/supplement intake, Offer support with meals,snacks and hydration    Friction and Shear Interventions: Minimize layers    Problem: Patient Education: Go to Patient Education Activity  Goal: Patient/Family Education  Outcome: Progressing Towards Goal     Problem: Falls - Risk of  Goal: *Absence of Falls  Description: Document Alma Delia Fall Risk and appropriate interventions in the flowsheet.   Outcome: Progressing Towards Goal  Note: Fall Risk Interventions:  Mobility Interventions: Bed/chair exit alarm, OT consult for ADLs, Patient to call before getting OOB, PT Consult for mobility concerns, Utilize walker, cane, or other assistive device    Elimination Interventions: Bed/chair exit alarm, Call light in reach    History of Falls Interventions: Bed/chair exit alarm Acute respiratory distress

## 2022-07-29 NOTE — PROGRESS NOTES
[facility] Hospitalist Progress Note  Sundeep Douglass MD    Date:2022       Room:[facility]  Patient Name:Hanh Valenzuela     YOB: 1955     Age:67 y.o.    22 admission course  Dirk Brown is a 75 y.o. female with PMH of ESRD, CAD, PAD, hypertension, diabetes, anemia and blind. She presented to the ED with chief complaint of pain at multiple sites. On my evaluation, she was in tears and saying that have been having flank pain , radiating to right abdomen for the past 2 days, associated with nausea, vomiting and cold sweats. No fever or chills. Pain is severe and intermittent, no known exacerbating or relieving factor. Taken tylenol without symptoms improvement. Per ED physician, however, she complained of chest pain and leg acheness, which she did not complained to me. She states that she did miss her hemodialysis today since she has not been feeling well. She reports sill producing little amount of urine. In the ED, noted K of 7.2 initially. Given IV insulin and kayexalate x 1. Repeat K is 6.4. Another dose of kayexalate given, repeat pending. 22 no new complaints, tolerating medications well  Hemodialysis anticipated for today    Subjective    Subjective:  Symptoms:  Stable. Review of Systems   All other systems reviewed and are negative. Objective         Vitals Last 24 Hours:  TEMPERATURE:  Temp  Av.2 °F (36.8 °C)  Min: 98.1 °F (36.7 °C)  Max: 98.4 °F (36.9 °C)  RESPIRATIONS RANGE: Resp  Av  Min: 16  Max: 18  PULSE OXIMETRY RANGE: SpO2  Av.5 %  Min: 97 %  Max: 98 %  PULSE RANGE: Pulse  Av.9  Min: 80  Max: 95  BLOOD PRESSURE RANGE: Systolic (62QII), DYF:424 , Min:140 , KJB:505   ; Diastolic (61AGE), RML:14, Min:62, Max:74    I/O (24Hr): Intake/Output Summary (Last 24 hours) at 2022 0443  Last data filed at 2022 1735  Gross per 24 hour   Intake --   Output 3000 ml   Net -3000 ml       Objective:  General Appearance:  Comfortable.     Vital signs: (most recent): Blood pressure (!) 141/66, pulse 86, temperature 98.4 °F (36.9 °C), resp. rate 18, height 5' 5\" (1.651 m), weight 61.2 kg (135 lb), SpO2 98 %, not currently breastfeeding. HEENT: Normal HEENT exam.    Lungs:  Normal effort. Heart: Normal rate. Regular rhythm. Abdomen: Abdomen is soft. Bowel sounds are normal.     Extremities: Normal range of motion. Neurological: Patient is alert and oriented to person, place and time. Pupils:  Pupils are equal, round, and reactive to light. Skin:  Warm and dry. Labs/Imaging/Diagnostics    Labs:  CBC:  Recent Labs     07/29/22  0937 07/27/22  1532   WBC 6.3 6.8   RBC 2.89* 2.86*   HGB 9.4* 9.2*   HCT 28.7* 28.6*   MCV 99.3* 100.0*   RDW 16.4* 17.0*   * 138*       CHEMISTRIES:  Recent Labs     07/29/22  0937 07/28/22  0830 07/27/22  2045     136 139  139 139   K 4.3  4.3 5.9*  5.9* 6.4*     100 103  103 103   CO2 26  26 22  23 23   BUN 41*  41* 95*  94* 92*   CA 9.2  9.2 9.7  9.8 10.2*   PHOS 4.3 4.6  --    MG 2.4  --   --      PT/INR:No results for input(s): INR, INREXT, INREXT in the last 72 hours. No lab exists for component: PROTIME  APTT:No results for input(s): APTT in the last 72 hours. LIVER PROFILE:  Recent Labs     07/29/22  0937 07/27/22  1720 07/27/22  1532   AST 23 20 21   ALT 31 25 26       Lab Results   Component Value Date/Time    ALT (SGPT) 31 07/29/2022 09:37 AM    AST (SGOT) 23 07/29/2022 09:37 AM    Alk. phosphatase 116 07/29/2022 09:37 AM    Bilirubin, direct 0.3 (H) 07/27/2022 05:20 PM    Bilirubin, total 0.8 07/29/2022 09:37 AM       Imaging Last 24 Hours:  NUCLEAR CARDIAC STRESS TEST    Result Date: 7/29/2022  Formatting of this result is different from the original.   Nuclear Findings: LVEF measures 30%. LV perfusion is normal.   Nuclear Findings: Abnormal left ventricular systolic function post-stress. LVEF measures 30%. Nuclear Findings: The study is negative for myocardial ischemia.  LVEF measures 30%. ECG: Stress ECG was negative for ischemia. Stress Test: A pharmacological stress test was performed using lexiscan. Stress Combined Conclusion: The study is negative for myocardial ischemia but low EF 30%. Assessment//Plan   Active Problems:    Hyperkalemia (7/28/2022)    Assessment & Plan  7/27/22 admission course  Rolando Perry is a 75 y.o. female with PMH of ESRD, CAD, PAD, hypertension, diabetes, anemia and blind. She presented to the ED with chief complaint of pain at multiple sites. On my evaluation, she was in tears and saying that have been having flank pain , radiating to right abdomen for the past 2 days, associated with nausea, vomiting and cold sweats. No fever or chills. Pain is severe and intermittent, no known exacerbating or relieving factor. Taken tylenol without symptoms improvement. Per ED physician, however, she complained of chest pain and leg acheness, which she did not complained to me. She states that she did miss her hemodialysis today since she has not been feeling well. She reports sill producing little amount of urine. In the ED, noted K of 7.2 initially. Given IV insulin and kayexalate x 1. Repeat K is 6.4. Another dose of kayexalate given, repeat pending. 7/28/22 no new complaints, tolerating medications well  Hemodialysis anticipated for today    ASSESSMENT AND PLAN    1) ESRD complicated by hyperkalemia and fluid overload and pulmonary edema. Development in the setting of missed hemodialysis. Hemodialysis per nephrology    2) Right sided abdominal pains, CT findings as above suggestive of Massively dilated gallbladder. HIDA scan   Seen by Dr. Soria/Surgery --> No surgical need    3) Cardiovascular issues including coronary disease, hypertension[facility] nuclear srtress test 1/21/21 suggesting EF 48%, small left ventricular perfusion defect.     4) Diabetes mellitus    5) DVT prophylaxis with heparin      Electronically signed by Bernardo Ward MD on 7/29/2022 at 8:31 AM

## 2022-07-29 NOTE — PROGRESS NOTES
Progress Note    Patient: Abilio Farrar MRN: [id number]  SSN: [id number]    YOB: 1955  Age: 75 y.o. Sex: female      Admit Date: 7/27/2022    LOS: 1 day     Subjective:     Patient seen and examined, no acute events overnight. Patient denies abdominal pain. No nausea or vomiting. Tolerated regular diet for lunch. Nuclear stress test done, patient high risk. Objective:     Vitals:    07/29/22 1505 07/29/22 1535 07/29/22 1605 07/29/22 1635   BP: (!) 154/65 (!) 159/69 (!) 174/67 (!) 159/62   Pulse: 91 86 82 84   Resp:       Temp:       TempSrc:       SpO2:       Weight:       Height:            Intake and Output:  Current Shift: No intake/output data recorded.   Last three shifts: 07/27 1901 - 07/29 0700  In: 300 [I.V.:300]  Out: 4000     Physical Exam:   General: alert, oriented, in no acute distress  Cardiovascular: regular rate and rhythm  Pulmonary: unlabored breathing, equal chest rise bilaterally  Abdomen: soft, non tender, non distended  Extremities: no swelling, pulses equal and palpable in all extremities    Lab/Data Review:  Recent Results (from the past 24 hour(s))   GLUCOSE, POC    Collection Time: 07/28/22  9:56 PM   Result Value Ref Range    Glucose (POC) 204 (H) 65 - 117 mg/dL    Performed by [facility]    RENAL FUNCTION PANEL    Collection Time: 07/29/22  9:37 AM   Result Value Ref Range    Sodium 136 136 - 145 mmol/L    Potassium 4.3 3.5 - 5.1 mmol/L    Chloride 100 97 - 108 mmol/L    CO2 26 21 - 32 mmol/L    Anion gap 10 5 - 15 mmol/L    Glucose 87 65 - 100 mg/dL    BUN 41 (H) 6 - 20 mg/dL    Creatinine 9.18 (H) 0.55 - 1.02 mg/dL    BUN/Creatinine ratio 4 (L) 12 - 20      GFR est AA 5 (L) >60 ml/min/1.73m2    GFR est non-AA 4 (L) >60 ml/min/1.73m2    Calcium 9.2 8.5 - 10.1 mg/dL    Phosphorus 4.3 2.6 - 4.7 mg/dL    Albumin 3.4 (L) 3.5 - 5.0 g/dL   METABOLIC PANEL, COMPREHENSIVE    Collection Time: 07/29/22  9:37 AM   Result Value Ref Range    Sodium 137 136 - 145 mmol/L    Potassium 4.3 3.5 - 5.1 mmol/L    Chloride 101 97 - 108 mmol/L    CO2 26 21 - 32 mmol/L    Anion gap 10 5 - 15 mmol/L    Glucose 85 65 - 100 mg/dL    BUN 41 (H) 6 - 20 mg/dL    Creatinine 9.27 (H) 0.55 - 1.02 mg/dL    BUN/Creatinine ratio 4 (L) 12 - 20      GFR est AA 5 (L) >60 ml/min/1.73m2    GFR est non-AA 4 (L) >60 ml/min/1.73m2    Calcium 9.2 8.5 - 10.1 mg/dL    Bilirubin, total 0.8 0.2 - 1.0 mg/dL    AST (SGOT) 23 15 - 37 U/L    ALT (SGPT) 31 12 - 78 U/L    Alk. phosphatase 116 45 - 117 U/L    Protein, total 6.5 6.4 - 8.2 g/dL    Albumin 3.4 (L) 3.5 - 5.0 g/dL    Globulin 3.1 2.0 - 4.0 g/dL    A-G Ratio 1.1 1.1 - 2.2     MAGNESIUM    Collection Time: 07/29/22  9:37 AM   Result Value Ref Range    Magnesium 2.4 1.6 - 2.4 mg/dL   CBC WITH AUTOMATED DIFF    Collection Time: 07/29/22  9:37 AM   Result Value Ref Range    WBC 6.3 3.6 - 11.0 K/uL    RBC 2.89 (L) 3.80 - 5.20 M/uL    HGB 9.4 (L) 11.5 - 16.0 g/dL    HCT 28.7 (L) 35.0 - 47.0 %    MCV 99.3 (H) 80.0 - 99.0 FL    MCH 32.5 26.0 - 34.0 PG    MCHC 32.8 30.0 - 36.5 g/dL    RDW 16.4 (H) 11.5 - 14.5 %    PLATELET 032 (L) 525 - 400 K/uL    MPV 12.8 8.9 - 12.9 FL    NRBC 0.0 0.0  WBC    ABSOLUTE NRBC 0.00 0.00 - 0.01 K/uL    NEUTROPHILS 65 32 - 75 %    LYMPHOCYTES 15 12 - 49 %    MONOCYTES 11 5 - 13 %    EOSINOPHILS 8 (H) 0 - 7 %    BASOPHILS 1 0 - 1 %    IMMATURE GRANULOCYTES 0 0 - 0.5 %    ABS. NEUTROPHILS 4.1 1.8 - 8.0 K/UL    ABS. LYMPHOCYTES 1.0 0.8 - 3.5 K/UL    ABS. MONOCYTES 0.7 0.0 - 1.0 K/UL    ABS. EOSINOPHILS 0.5 (H) 0.0 - 0.4 K/UL    ABS. BASOPHILS 0.1 0.0 - 0.1 K/UL    ABS. IMM.  GRANS. 0.0 0.00 - 0.04 K/UL    DF AUTOMATED     NUCLEAR CARDIAC STRESS TEST    Collection Time: 07/29/22 12:09 PM   Result Value Ref Range    Stress Target  bpm    Baseline HR 78 bpm    Baseline Systolic  mmHg    Baseline Diastolic BP 74 mmHg    Stress Peak  bpm    Stress Percent HR Achieved 85 %    Stress Stage 1 Duration 1min min:sec    Stress Stage 1  bpm    Stress Stage 2 Duration 2min min:sec    Stress Stage 2 HR 96 bpm    Stress Stage 2 /79 mmHg    Stress Stage 3 Duration 3min min:sec    Stress Stage 3 HR 98 bpm    Stress Stage 4 Duration 4min min:sec    Stress Stage 4 HR 97 bpm    Stress Stage 4 /84 mmHg    Stress Stage 5 Duration 5min min:sec    Stress Stage 5 HR 88 bpm    Nuc Stress EF 30 %    Nuc Rest EF 30 %   EKG, 12 LEAD, SUBSEQUENT    Collection Time: 07/29/22  1:44 PM   Result Value Ref Range    Ventricular Rate 92 BPM    Atrial Rate 92 BPM    P-R Interval 140 ms    QRS Duration 88 ms    Q-T Interval 396 ms    QTC Calculation (Bezet) 489 ms    Calculated P Axis 77 degrees    Calculated R Axis 78 degrees    Calculated T Axis 77 degrees    Diagnosis       Normal sinus rhythm  Minimal voltage criteria for LVH, may be normal variant  Anterior infarct , age undetermined  Abnormal ECG  When compared with ECG of 27-JUL-2022 15:26,  Questionable change in QRS duration  Anterior infarct is now Present  Confirmed by Kingston Wang ([id number]) on 7/29/2022 4:05:51 PM            Assessment:     Active Problems:    Hyperkalemia (7/28/2022)      Distended gallbladder  Normal LFTs  No leukocytosis  No abdominal tenderness    Plan:     Diet: as tolerated  Recommend against surgical intervention at this point given no clinical signs of acute cholecystitis and patient is pain free. She wishes to avoid surgery as well. Will monitor and consider alternative therapies if condition worsens.     Signed By: Marylene Courts Deters, DO     July 29, 2022

## 2022-07-29 NOTE — CONSULTS
Surgery Consult    Patient: Diego Michael MRN: [id number]  SSN: [id number]    YOB: 1955  Age: 75 y.o. Sex: female      Subjective: Diego Michael is a 75 y.o. female who is being seen for distended gallbladder. She presented to the ED complaining of flank pain, chest pain, and weakness. She missed a dialysis session and was found to be hyperkalemic on presentation. CT of the abdomen demonstrated a distended gallbladder without secondary signs of acute cholecystitis. HIDA scan showed no filling of the gallbladder. At the time of my exam, the patient denied any abdominal pain and stated she was hungry.     Past Medical History:   Diagnosis Date    Blind     CKD (chronic kidney disease)     HTN (hypertension)     Retinopathy     Type II or unspecified type diabetes mellitus with renal manifestations, uncontrolled(250.42) (HCC)      Past Surgical History:   Procedure Laterality Date    HX CORONARY ARTERY BYPASS GRAFT      HX GYN      hysterectomy       Family History   Problem Relation Age of Onset    Diabetes Maternal Aunt     Diabetes Maternal Grandmother      Social History     Tobacco Use    Smoking status: Never    Smokeless tobacco: Never   Substance Use Topics    Alcohol use: No    Drug use: Never      Current Facility-Administered Medications   Medication Dose Route Frequency Provider Last Rate Last Admin    amLODIPine (NORVASC) tablet 10 mg  10 mg Oral DAILY Keron Arnold MD        carvediloL (COREG) tablet 25 mg  25 mg Oral BID WITH MEALS Keron Arnold MD   25 mg at 07/28/22 1748    cholecalciferol (VITAMIN D3) (1000 Units /25 mcg) tablet 2,000 Units  2,000 Units Oral DAILY Keron Arnold MD        [Held by provider] clopidogreL (PLAVIX) tablet 75 mg  75 mg Oral DAILY Elvis Jarvis MD        ferrous sulfate tablet 325 mg  325 mg Oral DAILY WITH BREAKFAST Keron Arnold MD        furosemide (LASIX) tablet 20 mg  20 mg Oral DAILY Elvis Jarvis MD        hydrALAZINE (APRESOLINE) tablet 25 mg  25 mg Oral TID Yesi Mahoney MD   25 mg at 07/28/22 1733    [Held by provider] insulin glargine (LANTUS) injection 8 Units  8 Units SubCUTAneous DAILY Kaylin Arnold MD        pantoprazole (PROTONIX) tablet 40 mg  40 mg Oral ACB Kaylin Arnold MD        atorvastatin (LIPITOR) tablet 40 mg  40 mg Oral QHS Yesi Mahoney MD   40 mg at 07/28/22 0319    sodium chloride (NS) flush 5-40 mL  5-40 mL IntraVENous Q8H Kaylin Arnold MD   10 mL at 07/28/22 1758    sodium chloride (NS) flush 5-40 mL  5-40 mL IntraVENous PRN Kaylin Arnold MD        acetaminophen (TYLENOL) tablet 650 mg  650 mg Oral Q6H PRN Kaylin Arnold MD        Or    acetaminophen (TYLENOL) suppository 650 mg  650 mg Rectal Q6H PRN Kaylin Arnold MD        polyethylene glycol (MIRALAX) packet 17 g  17 g Oral DAILY PRN Kaylin Arnold MD        ondansetron (ZOFRAN ODT) tablet 4 mg  4 mg Oral Q8H PRN Kaylin Arnold MD        Or    ondansetron (ZOFRAN) injection 4 mg  4 mg IntraVENous Q6H PRN Kaylin Arnold MD        heparin (porcine) injection 5,000 Units  5,000 Units SubCUTAneous Q8H Yesi Mahoney MD   5,000 Units at 07/28/22 1733    labetaloL (NORMODYNE;TRANDATE) 20 mg/4 mL (5 mg/mL) injection 10 mg  10 mg IntraVENous Q4H PRN Kaylin Arnold MD        glucose chewable tablet 16 g  4 Tablet Oral PRN Kaylin Arnold MD        glucagon (GLUCAGEN) injection 1 mg  1 mg IntraMUSCular PRN Kaylin Arnold MD        dextrose 10% infusion 0-250 mL  0-250 mL IntraVENous PRN Yesi Mahoney MD        insulin lispro (HUMALOG) injection   SubCUTAneous AC&HS Albin Beauchamp MD        sevelamer carbonate (RENVELA) tab 800 mg  800 mg Oral TID WITH MEALS Jacob Espinoza MD   800 mg at 07/28/22 1733        No Known Allergies    Review of Systems:  Review of Systems   Constitutional:  Positive for malaise/fatigue. Negative for chills, fever and weight loss. HENT:  Negative for congestion, ear pain and sore throat. Eyes:  Negative for blurred vision and redness. Respiratory:  Negative for cough, shortness of breath and wheezing. Cardiovascular:  Negative for chest pain, palpitations and leg swelling. Gastrointestinal:  Negative for abdominal pain, nausea and vomiting. Genitourinary:  Negative for dysuria, frequency and hematuria. Musculoskeletal:  Negative for joint pain and myalgias. Skin:  Negative for itching and rash. Neurological:  Positive for weakness. Negative for dizziness, seizures and headaches. Endo/Heme/Allergies:  Does not bruise/bleed easily. Objective:     Vitals:    07/28/22 1200 07/28/22 1230 07/28/22 1748 07/28/22 2004   BP: (!) 210/90 (!) 197/88 (!) 175/81 (!) 140/73   Pulse: (!) 101 99 89 83   Resp: 18 18  17   Temp:    98.2 °F (36.8 °C)   SpO2:    97%   Weight:       Height:            Physical Exam:  General:  Alert, cooperative, no apparent distress. Head:  Normocephalic, without obvious abnormality, atraumatic. Eyes:  No scleral injection. Pupils equal, round, reactive to light. Extraocular movements intact. Lungs:   Equal chest rise bilaterally, no wheezes, rales, or rhonchi   Chest wall:  No tenderness or deformity. Heart:  Regular rate and rhythm   Abdomen:   Soft, non-tender, non-distended. Bowel sounds normal. No masses   Extremities: Extremities normal, atraumatic, no cyanosis or edema. Pulses: 2+ and symmetric all extremities. Skin: Skin color, texture, turgor normal. No rashes or lesions.        Recent Results (from the past 24 hour(s))   METABOLIC PANEL, BASIC    Collection Time: 07/27/22  8:45 PM   Result Value Ref Range    Sodium 139 136 - 145 mmol/L    Potassium 6.4 (H) 3.5 - 5.1 mmol/L    Chloride 103 97 - 108 mmol/L    CO2 23 21 - 32 mmol/L    Anion gap 13 5 - 15 mmol/L    Glucose 73 65 - 100 mg/dL    BUN 92 (H) 6 - 20 mg/dL    Creatinine 14.60 (H) 0.55 - 1.02 mg/dL    BUN/Creatinine ratio 6 (L) 12 - 20      GFR est AA 3 (L) >60 ml/min/1.73m2    GFR est non-AA 3 (L) >60 ml/min/1.73m2    Calcium 10.2 (H) 8.5 - 10.1 mg/dL   TROPONIN-HIGH SENSITIVITY    Collection Time: 07/28/22  3:23 AM   Result Value Ref Range    Troponin-High Sensitivity 107 (H) 0 - 51 ng/L   GLUCOSE, POC    Collection Time: 07/28/22  7:06 AM   Result Value Ref Range    Glucose (POC) 86 65 - 117 mg/dL    Performed by Lea Carranza    RENAL FUNCTION PANEL    Collection Time: 07/28/22  8:30 AM   Result Value Ref Range    Sodium 139 136 - 145 mmol/L    Potassium 5.9 (H) 3.5 - 5.1 mmol/L    Chloride 103 97 - 108 mmol/L    CO2 22 21 - 32 mmol/L    Anion gap 14 5 - 15 mmol/L    Glucose 100 65 - 100 mg/dL    BUN 95 (H) 6 - 20 mg/dL    Creatinine 14.60 (H) 0.55 - 1.02 mg/dL    BUN/Creatinine ratio 7 (L) 12 - 20      GFR est AA 3 (L) >60 ml/min/1.73m2    GFR est non-AA 3 (L) >60 ml/min/1.73m2    Calcium 9.7 8.5 - 10.1 mg/dL    Phosphorus 4.6 2.6 - 4.7 mg/dL    Albumin 3.6 3.5 - 5.0 g/dL   METABOLIC PANEL, BASIC    Collection Time: 07/28/22  8:30 AM   Result Value Ref Range    Sodium 139 136 - 145 mmol/L    Potassium 5.9 (H) 3.5 - 5.1 mmol/L    Chloride 103 97 - 108 mmol/L    CO2 23 21 - 32 mmol/L    Anion gap 13 5 - 15 mmol/L    Glucose 99 65 - 100 mg/dL    BUN 94 (H) 6 - 20 mg/dL    Creatinine 14.80 (H) 0.55 - 1.02 mg/dL    BUN/Creatinine ratio 6 (L) 12 - 20      GFR est AA 3 (L) >60 ml/min/1.73m2    GFR est non-AA 2 (L) >60 ml/min/1.73m2    Calcium 9.8 8.5 - 10.1 mg/dL   ECHO ADULT COMPLETE    Collection Time: 07/28/22  3:30 PM   Result Value Ref Range    LV EDV A2C 123 mL    LV EDV A4C 120 mL    LV ESV A2C 88 mL    LV ESV A4C 85 mL    IVSd 1.1 0.6 - 0.9 cm    IVSd M-mode 1.3 (A) 0.6 - 0.9 cm    LVIDd 5.4 3.9 - 5.3 cm    LVIDd M-mode 5.4 (A) 3.9 - 5.3 cm    LVIDs 4.7 cm    LVIDs M-mode 5.0 cm    LVOT Diameter 2.1 cm    LVOT Mean Gradient 1 mmHg    LVOT VTI 11.1 cm    LVOT Peak Velocity 0.7 m/s    LVOT Peak Gradient 2 mmHg    LVPWd 1.1 0.6 - 0.9 cm    LVPWd M-mode 1.2 (A) 0.6 - 0.9 cm    LV E' Lateral Velocity 8 cm/s    LV E' Septal Velocity 3 cm/s    LV Ejection Fraction A2C 28 %    LV Ejection Fraction A4C 29 %    EF BP 30 55 - 100 %    LVOT Area 3.5 cm2    LVOT SV 38.4 ml    LA Minor Axis 3.8 cm    LA Major [place] 4.2 cm    LA Area 2C 15.2 cm2    LA Area 4C 14.0 cm2    LA Volume BP 42 22 - 52 mL    LA Diameter 3.6 cm    AV Mean Gradient 3 mmHg    AV VTI 16.9 cm    AV Mean Velocity 0.7 m/s    AV Peak Velocity 1.1 m/s    AV Peak Gradient 5 mmHg    AV Area by VTI 2.3 cm2    AV Area by Peak Velocity 2.1 cm2    Aortic Root 3.0 cm    Ascending Aorta 2.8 cm    MV Nyquist Velocity 92 cm/s    MR .0 cm    MV Mean Gradient 2 mmHg    MV VTI 18.2 cm    MV Mean Velocity 0.7 m/s    MR Peak Velocity 5.8 m/s    MR Peak Gradient 135 mmHg    MV Max Velocity 1.5 m/s    MV Peak Gradient 9 mmHg    MV A Velocity 0.43 m/s    MV E Velocity 1.30 m/s    MV Area by VTI 2.1 cm2    NE Max Velocity 2.1 m/s    Pulmonary Artery EDP 18 mmHg    PV Mean Gradient 1 mmHg    PV VTI 10.5 cm    PV Mean Velocity 0.4 m/s    PV Max Velocity 0.6 m/s    PV Peak Gradient 2 mmHg    TR Max Velocity 4.44 m/s    TR Peak Gradient 79 mmHg    TAPSE 1.1 1.7 cm    Fractional Shortening 2D 13 28 - 44 %    LV ESV Index A4C 49 mL/m2    LV EDV Index A4C 69 mL/m2    LV ESV Index A2C 50 mL/m2    LV EDV Index A2C 70 mL/m2    LVIDd Index 3.09 cm/m2    LVIDs Index 2.69 cm/m2    LV RWT Ratio 0.41     LV Mass 2D 234.8 (A) 67 - 162 g    LV Mass 2D Index 134.2 (A) 43 - 95 g/m2    MV E/A 3.02     E/E' Ratio (Averaged) 29.79     E/E' Lateral 16.25     E/E' Septal 43.33     LA Volume Index BP 24 16 - 34 ml/m2    LVOT Stroke Volume Index 22.0 mL/m2    LA Size Index 2.06 cm/m2    LA/AO Root Ratio 1.20     Ao Root Index 1.71 cm/m2    Ascending Aorta Index 1.60 cm/m2    AV Velocity Ratio 0.64     LVOT:AV VTI Index 0.66     KETAN/BSA VTI 1.3 cm2/m2    KETAN/BSA Peak Velocity 1.2 cm2/m2    MV:LVOT VTI Index 1.64     LA Volume 4C 34 22 - 52 mL    LA Volume Index 4C 19 16 - 34 mL/m2    RV Free Wall Peak S' 8 cm/s    Est. RA Pressure 8 mmHg MV EROA Cont Eq 0.1 cm2    RVSP 87 mmHg        NM HEPATOBILIARY DUCT SCAN   Final Result   Absent gallbladder visualization through 90 minutes of imaging,   suggestive of acute cholecystitis. No evidence of common bile duct obstruction. CT ABD PELV WO CONT   Final Result   1. Massively dilated gallbladder extends below the sacral promontory. Hepatobiliary scintigraphy (\"HIDA scan\") is recommended to differentiate acute   cholecystitis from a hydropic gallbladder. 2. Cardiomegaly. Small bilateral pleural effusions. The findings were called to Dr. Soledad Garcia on 7/28/2022 at 0703 hours by Dr. Belinda Hernandez. [phone]      XR ABD (KUB)   Final Result   1. Cardiomegaly. Pulmonary vascular congestion without overt interstitial edema. 2. Normal bowel gas pattern. Lobulated, retracted abdominal wall. This should be   readily evident on physical examination. XR CHEST PORT   Final Result   Interstitial and parenchymal pulmonary edema with cardiomegaly. Assessment:     Hospital Problems  Date Reviewed: 4/24/2022            Codes Class Noted POA    Hyperkalemia ICD-10-CM: E87.5  ICD-9-CM: 276.7  7/28/2022 Unknown         Distended gallbladder  Normal LFTs  No leukocytosis  No abdominal tenderness    Plan:     Low suspicion for clinically significant acute cholecystitis at this point.   Will continue to monitor labs and abdominal exam. If clinically worsening, may benefit from cholecystostomy tube  Continue cardiac workup and further medical management    Signed By: Nikita Soria DO     July 28, 2022         Thank you for allowing me to participate in this patients care

## 2022-07-29 NOTE — PROGRESS NOTES
Nephrology Consult    Patient: Les Barnard MRN: [id number]  SSN: [id number]    YOB: 1955  Age: 75 y.o. Sex: female      Subjective:    The patient is seen  She was dialyzed on 7/28 and removed 4 L  Currently on room air  Elevated blood pressures      Past Medical History:   Diagnosis Date    Blind     CKD (chronic kidney disease)     HTN (hypertension)     Retinopathy     Type II or unspecified type diabetes mellitus with renal manifestations, uncontrolled(250.42) ([address]      Past Surgical History:   Procedure Laterality Date    HX CORONARY ARTERY BYPASS GRAFT      HX GYN      hysterectomy       Family History   Problem Relation Age of Onset    Diabetes Maternal Aunt     Diabetes Maternal Grandmother      Social History     Tobacco Use    Smoking status: Never    Smokeless tobacco: Never   Substance Use Topics    Alcohol use: No      Current Facility-Administered Medications   Medication Dose Route Frequency Provider Last Rate Last Admin    amLODIPine (NORVASC) tablet 10 mg  10 mg Oral DAILY Kaylin Arnold MD        carvediloL (COREG) tablet 25 mg  25 mg Oral BID WITH MEALS Kaylin Arnold MD   25 mg at 07/28/22 1748    cholecalciferol (VITAMIN D3) (1000 Units /25 mcg) tablet 2,000 Units  2,000 Units Oral DAILY Kaylin Arnold MD        [Held by provider] clopidogreL (PLAVIX) tablet 75 mg  75 mg Oral DAILY Kaylin Arnold MD        ferrous sulfate tablet 325 mg  325 mg Oral DAILY WITH BREAKFAST Michael Arnold MD        furosemide (LASIX) tablet 20 mg  20 mg Oral DAILY Kaylin Arnold MD        hydrALAZINE (APRESOLINE) tablet 25 mg  25 mg Oral TID Yesi Mahoney MD   25 mg at 07/28/22 2146    [Held by provider] insulin glargine (LANTUS) injection 8 Units  8 Units SubCUTAneous DAILY Michael Arnold MD        pantoprazole (PROTONIX) tablet 40 mg  40 mg Oral ACB Kaylin Arnold MD        atorvastatin (LIPITOR) tablet 40 mg  40 mg Oral QHS Yesi Mahoney MD   40 mg at 07/28/22 2146    sodium chloride (NS) flush 5-40 mL  5-40 mL IntraVENous Q8H Maryjane Arnold MD   10 mL at 07/29/22 0526    sodium chloride (NS) flush 5-40 mL  5-40 mL IntraVENous PRN Maryjane Arnold MD        acetaminophen (TYLENOL) tablet 650 mg  650 mg Oral Q6H PRN Maryjane Arnold MD        Or    acetaminophen (TYLENOL) suppository 650 mg  650 mg Rectal Q6H PRN Maryjane Arnold MD        polyethylene glycol (MIRALAX) packet 17 g  17 g Oral DAILY PRN Maryjane Arnold MD        ondansetron (ZOFRAN ODT) tablet 4 mg  4 mg Oral Q8H PRN Maryjane Arnold MD        Or    ondansetron (ZOFRAN) injection 4 mg  4 mg IntraVENous Q6H PRN Maryjane Arnold MD        heparin (porcine) injection 5,000 Units  5,000 Units SubCUTAneous Q8H Sugar Lackey MD   5,000 Units at 07/29/22 0525    labetaloL (NORMODYNE;TRANDATE) 20 mg/4 mL (5 mg/mL) injection 10 mg  10 mg IntraVENous Q4H PRN Maryjane Arnold MD        glucose chewable tablet 16 g  4 Tablet Oral PRN Maryjane Arnold MD        glucagon (GLUCAGEN) injection 1 mg  1 mg IntraMUSCular PRN Maryjane Arnold MD        dextrose 10% infusion 0-250 mL  0-250 mL IntraVENous PRN Maryjane Arnold MD        insulin lispro (HUMALOG) injection   SubCUTAneous AC&HS Duc Tapia MD   4 Units at 07/28/22 2217    sevelamer carbonate (RENVELA) tab 800 mg  800 mg Oral TID WITH MEALS Dean Cedeno MD   800 mg at 07/28/22 1733        No Known Allergies    Review of Systems:  A comprehensive review of systems was negative except for that written in the History of Present Illness.     Objective:     Vitals:    07/29/22 0349 07/29/22 0400 07/29/22 0758 07/29/22 1053   BP:    (!) 188/74   Pulse: 95  91    Resp:       Temp:       SpO2:       Weight:  61.6 kg (135 lb 12.9 oz)  61.2 kg (135 lb)   Height:    5' 5\" (1.651 m)        Physical Exam:  General: NAD  Eyes: sclera anicteric  Oral Cavity: No thrush or ulcers  Neck: no JVD  Chest: Decreased bilateral air entry and basal crackles  Heart: normal sounds  Abdomen: soft and non tender   : no noel  Lower Extremities: no edema  Skin: no rash  Neuro: intact  Psychiatric: non-depressed            Assessment:     Hospital Problems  Date Reviewed: 4/24/2022            Codes Class Noted POA    Hyperkalemia ICD-10-CM: E87.5  ICD-9-CM: 276.7  7/28/2022 Unknown           Plan:     1 ESRD. -On hemodialysis Monday Wednesday Friday at [address] renal care in [facility].    -She was last dialyzed on 7/20 in her outpatient center.    -She came with volume overload/BUN 91 and creatinine 14.40 and K of 7.2.    -Status post dialyzed on 7/28 and removed 4 L. -I will dialyze her tomorrow also. -She has right upper arm AV fistula is a dialysis access. 2.  Hyperkalemia. -Due to missed dialysis sessions.    -On admission K was 7.2.    -Medically treated twice in the ER with Kayexalate and K is 5.9 on 7/28    -dialyzed with 1K bath for first hour then 2K bath for the rest of the dialysis treatment.  -K 4.3  -will use 2K bath    3. Hypertension.    -Malignant/accelerated. -Volume related and medical noncompliance also. -Blood pressure systolic was 176.    -dialyzed with UF of 4 L.    -better blood pressure but still not at goal.  -I will increase hydralazine 50 mg 3 times daily. 4.  Anemia.    -Hemoglobin 9.2.    -I will give erythropoietin IV with dialysis. 5.  Renal osteodystrophy.    -Her calcium is 10.2.    -phosphorus level 4.3. I will continue phosphorus binders.     Signed By: Babita Rios MD     July 29, 2022

## 2022-07-29 NOTE — PROGRESS NOTES
CONSULTATION     REASON FOR CONSULT:  Pre-operative risk assessment     REQUESTING PROVIDER:  Dr. Iris Freed:        Chief Complaint   Patient presents with    Chest Pain            HISTORY OF PRESENT ILLNESS:  Diego Langstonr is a 75y.o. year-old female with past medical history significant for congestive heart failure, CAD s/p CABG, ischemic cardiomyopathy (Medtronic device in place), hypertension, hyperlipidemia, and  chronic kidney disease (dialysis patient) who was admitted to the hospital for further evaluation of chest pain, abdominal pain, and myalgias for the past 2 days. Patient reports missing a dialysis session. On presentation to the ED labs showed  a K 7.2, after emergent dialysis K is now 5.9. EKG showed sinus tachycardia with peaked T waves consistent with hyperkalemia. Patient was seen and examined in dialysis, she was tolerating session well with no new complaints. CTA showed dilated gallbladder, HIDA scan recommended for further evaluation. HS troponin 88 > 107.    7/29/22:  Nuclear stress test delayed this morning to insure hyperkalemia resolved, which it had as K+ was 4.3.  Mrs. Muna Nichole is seen resting in the stretcher prior to NST. No chest pain or dyspnea. Reports fatigue only. No nausea or vomiting. No palpitations or dizziness. Records from hospital admission course thus far reviewed. Telemetry Review: No acute events noted since admission. Remains in NSR.         PAST MEDICAL HISTORY:         Past Medical History:   Diagnosis Date    Blind      CKD (chronic kidney disease)      HTN (hypertension)      Retinopathy      Type II or unspecified type diabetes mellitus with renal manifestations, uncontrolled(250.42) ([facility] [address]           PAST SURGICAL HISTORY:         Past Surgical History:   Procedure Laterality Date    HX CORONARY ARTERY BYPASS GRAFT        HX GYN         hysterectomy          ALLERGIES:  No Known Allergies     FAMILY HISTORY:          Family History   Problem Relation Age of Onset    Diabetes Maternal Aunt      Diabetes Maternal Grandmother           SOCIAL HISTORY:    Tobacco: Never  Drugs: not documented  ETOH: not documented     HOME MEDICATIONS:    Prior to Admission Medications   Prescriptions Last Dose Informant Patient Reported? Taking? FREESTYLE LANCETS 28 gauge [facility]     Yes No   FREESTYLE LITE STRIPS strip     No No   Sig: TEST FOUR TIMES DAILY   Insulin Needles, Disposable, (Rupali Pen Needle) 32 gauge x 5/32\" ndle     No No   Sig: USE ONCE DAILY DX. CODE E11.65   Lancets (FREESTYLE LANCETS) [facility]     No No   Sig: Test 4 times daily. Dx code E11.65   OTHER     Yes No   Sig: Liquid Protein   acetaminophen (TYLENOL EXTRA STRENGTH) 500 mg tablet     Yes No   Sig: Take  by mouth every six (6) hours as needed for Pain. amLODIPine (NORVASC) 10 mg tablet     Yes No   Sig: Take 10 mg by mouth daily. carvedilol (COREG) 25 mg tablet     Yes No   Sig: Take 25 mg by mouth two (2) times daily (with meals). cholecalciferol (Vitamin D3) (1000 Units /25 mcg) tablet     Yes No   Sig: Take 2,000 Units by mouth daily. clopidogreL (PLAVIX) 75 mg tab     Yes No   Sig: Take 75 mg by mouth daily. diclofenac (Voltaren Arthritis Pain) 1 % gel     Yes No   Sig: apply to foot and hands as needed for arthritis pain   docusate sodium (COLACE) 100 mg capsule     Yes No   Sig: Take 100 mg by mouth as needed for Constipation. ferrous sulfate 325 mg (65 mg iron) tablet     Yes No   Sig: Take 325 mg by mouth as needed. flash glucose scanning reader (FreeStyle Chance 2 [place]) misc     No No   Sig: To use it for checking sugars   Patient not taking: Reported on 4/21/2022   furosemide (LASIX) 20 mg tablet     Yes No   Sig: Take 20 mg by mouth daily. hydrALAZINE (APRESOLINE) 25 mg tablet     Yes No   Sig: Take 25 mg by mouth three (3) times daily. insulin glargine (Lantus Solostar U-100 Insulin) 100 unit/mL (3 mL) inpn     No No   Sig: Inject 8 units daily. pantoprazole (PROTONIX) 40 mg tablet     Yes No   rosuvastatin (CRESTOR) 20 mg tablet     No No   Sig: TAKE 1 TABLET NIGHTLY (STOP 40 MG DOSE)   sacubitriL-valsartan (Entresto) 49-51 mg tab tablet     Yes No   Sig: Take 1 Tab by mouth two (2) times a day. sevelamer carbonate (RENVELA) 800 mg tab tab     Yes No   Sig: Take  by mouth three (3) times daily. Facility-Administered Medications: None        REVIEW OF SYSTEMS:  Complete review of systems performed, pertinents noted above, all other systems are negative. Patient Vitals for the past 24 hrs:    Temp Pulse Resp BP SpO2   07/28/22 1230 -- 99 18 (!) 197/88 --   07/28/22 1200 -- (!) 101 18 (!) 210/90 --   07/28/22 1130 -- 97 16 (!) 199/88 --   07/28/22 1100 -- 92 16 (!) 180/83 --   07/28/22 1030 -- 97 16 (!) 221/85 --   07/28/22 1000 -- 96 14 (!) 198/89 --   07/28/22 0930 -- 96 16 (!) 194/81 --   07/28/22 0900 -- 99 16 (!) 198/92 --   07/28/22 0830 -- (!) 102 -- (!) 214/94 --   07/28/22 0600 -- 95 -- (!) 156/71 100 %   07/28/22 0430 -- 98 -- (!) 184/80 98 %   07/28/22 0355 -- -- -- (!) 191/96 --   07/28/22 0330 -- (!) 109 -- (!) 203/88 93 %   07/28/22 0231 -- (!) 115 -- -- 95 %   07/27/22 2306 -- (!) 112 19 (!) 178/88 95 %   07/27/22 1525 97.9 °F (36.6 °C) (!) 111 19 (!) 180/92 98 %         PHYSICAL EXAMINATION:    General: appears chronically ill, NAD, A&O  HEENT: Normocephalic, PERRL, no drainage  Neck: Supple, Trachea midline, No JVD  RESP: CTA bilaterally. + Symmetrical chest movement. No SOB or distress. On O2 per protocol  Cardiovascular: RRR no MRG  PVS: No rubor, cyanosis, no edema,  ABD:,soft, NT, Normoactive BS  Derm: Warm/Dry/Intact with no lesions,  Neuro: A&O PPTS, . No focal deficits  PSYCH: No anxiety or agitation    Recent labs results and imaging reviewed. Hgb stable. Lytes acceptable.          Current Facility-Administered Medications:    amLODIPine (NORVASC) tablet 10 mg, 10 mg, Oral, DAILY, Carlton Arnold MD    carvediloL (COREG) tablet 25 mg, 25 mg, Oral, BID WITH MEALS, Caroline Arnold MD    cholecalciferol (VITAMIN D3) (1000 Units /25 mcg) tablet 2,000 Units, 2,000 Units, Oral, DAILY, Caroline Arnold MD    [Held by provider] clopidogreL (PLAVIX) tablet 75 mg, 75 mg, Oral, DAILY, Michael Arnold MD    ferrous sulfate tablet 325 mg, 325 mg, Oral, DAILY WITH BREAKFAST, Caroline Arnold MD    furosemide (LASIX) tablet 20 mg, 20 mg, Oral, DAILY, Caroline Arnold MD    hydrALAZINE (APRESOLINE) tablet 25 mg, 25 mg, Oral, TID, Caroline Arnold MD    [Held by provider] insulin glargine (LANTUS) injection 8 Units, 8 Units, SubCUTAneous, DAILY, Caroline Arnold MD    pantoprazole (PROTONIX) tablet 40 mg, 40 mg, Oral, ACB, Caroline Arnold MD    atorvastatin (LIPITOR) tablet 40 mg, 40 mg, Oral, QHS, Caroline Arnold MD, 40 mg at 07/28/22 0319    sodium chloride (NS) flush 5-40 mL, 5-40 mL, IntraVENous, Q8H, Michael Arnold MD    sodium chloride (NS) flush 5-40 mL, 5-40 mL, IntraVENous, PRN, Caroline Arnold MD    acetaminophen (TYLENOL) tablet 650 mg, 650 mg, Oral, Q6H PRN **OR** acetaminophen (TYLENOL) suppository 650 mg, 650 mg, Rectal, Q6H PRN, Caroline Arnold MD    polyethylene glycol (MIRALAX) packet 17 g, 17 g, Oral, DAILY PRN, Caroline Arnold MD    ondansetron (ZOFRAN ODT) tablet 4 mg, 4 mg, Oral, Q8H PRN **OR** ondansetron (ZOFRAN) injection 4 mg, 4 mg, IntraVENous, Q6H PRN, Caroline Arnold MD    heparin (porcine) injection 5,000 Units, 5,000 Units, SubCUTAneous, Q8H, Caroline Arnold MD, 5,000 Units at 07/28/22 8359    labetaloL (NORMODYNE;TRANDATE) 20 mg/4 mL (5 mg/mL) injection 10 mg, 10 mg, IntraVENous, Q4H PRN, Caroline Arnold MD    glucose chewable tablet 16 g, 4 Tablet, Oral, PRN, Caroline Arnold MD    glucagon (GLUCAGEN) injection 1 mg, 1 mg, IntraMUSCular, PRN, [place], Michael Jaime MD    dextrose 10% infusion 0-250 mL, 0-250 mL, IntraVENous, PRN, Caroline Arnold MD    insulin lispro (HUMALOG) injection, , SubCUTAneous, AC&HS, MD lindsey Ashford carbonate (RENVELA) tab 800 mg, 800 mg, Oral, TID WITH MEALS, Hira Tsai MD     Current Outpatient Medications:    diclofenac (Voltaren Arthritis Pain) 1 % gel, apply to foot and hands as needed for arthritis pain, Disp: , Rfl:    cholecalciferol (Vitamin D3) (1000 Units /25 mcg) tablet, Take 2,000 Units by mouth daily. , Disp: , Rfl:    Insulin Needles, Disposable, (Rupali Pen Needle) 32 gauge x 5/32\" ndle, USE ONCE DAILY DX. CODE E11.65, Disp: 100 Pen Needle, Rfl: 3    furosemide (LASIX) 20 mg tablet, Take 20 mg by mouth daily. , Disp: , Rfl:    flash glucose scanning reader (Light[employer] Chance 2 [place]) misc, To use it for checking sugars (Patient not taking: Reported on 4/21/2022), Disp: 1 Each, Rfl: 0    rosuvastatin (CRESTOR) 20 mg tablet, TAKE 1 TABLET NIGHTLY (STOP 40 MG DOSE), Disp: 90 Tablet, Rfl: 3    amLODIPine (NORVASC) 10 mg tablet, Take 10 mg by mouth daily. , Disp: , Rfl:    clopidogreL (PLAVIX) 75 mg tab, Take 75 mg by mouth daily. , Disp: , Rfl:    sacubitriL-valsartan (Entresto) 49-51 mg tab tablet, Take 1 Tab by mouth two (2) times a day., Disp: , Rfl:    insulin glargine (Lantus Solostar U-100 Insulin) 100 unit/mL (3 mL) inpn, Inject 8 units daily. , Disp: 15 mL, Rfl: 3    OTHER, Liquid Protein, Disp: , Rfl:    hydrALAZINE (APRESOLINE) 25 mg tablet, Take 25 mg by mouth three (3) times daily. , Disp: , Rfl: 1    sevelamer carbonate (RENVELA) 800 mg tab tab, Take  by mouth three (3) times daily. , Disp: , Rfl:    FREESTYLE LITE STRIPS strip, TEST FOUR TIMES DAILY, Disp: 200 Strip, Rfl: 0    pantoprazole (PROTONIX) 40 mg tablet, , Disp: , Rfl:    FREESTYLE LANCETS 28 gauge misc, , Disp: , Rfl:    acetaminophen (TYLENOL EXTRA STRENGTH) 500 mg tablet, Take  by mouth every six (6) hours as needed for Pain., Disp: , Rfl:    Lancets (FREESTYLE LANCETS) misc, Test 4 times daily. Dx code E11.65, Disp: 200 Each, Rfl: 6    docusate sodium (COLACE) 100 mg capsule, Take 100 mg by mouth as needed for Constipation. , Disp: , Rfl:    carvedilol (COREG) 25 mg tablet, Take 25 mg by mouth two (2) times daily (with meals). , Disp: , Rfl:    ferrous sulfate 325 mg (65 mg iron) tablet, Take 325 mg by mouth as needed. , Disp: , Rfl:      Case discussed with collaborating physician Dr. Veronica Urbina and our impression and recommendations are as follows:     Pre-operative risk assessment:  - patient presented to the hospital after missing a dialysis session, complaining of RLQ pain, CT scan showed dilated gallbladder, HIDA scan suggestive of acute cholecystitis. -  Nuclear stress test with normal perfusion and EF 30%. Echocardiogram showing an EF of 15-20%, which is down from 40-45% in 2020. She appears somewhat compensated. Volume removal per HD/renal.  Should cholecystectomy be required she would be considered high risk. Avoid excessive IVF. 2. Coronary artery disease:  - history of CABG in 2013  - continue statin, BB  3. Ischemic cardiomyopathy:  s/p Medtronic single chamber ICD. EF down to 15-20% from 40-45% in 2020. Volume removal per HD. Will add Isordil 10mg TID to hydralazine and continue beta blocker. Remains on amlodipine for BP support, however, could up-titrate hydralazine/Isordil combo to advance GDMT. No ARNI given HD. 4. Hypertension: Remains elevated despite HD. Adding Isordil as stated. 5. Chronic kidney disease:  - managed per nephrology. Appreciate recommendations. - Dialysis M-W-Fr  - presented with hyperkalemia initial K 7.2, EKG showed peaked T waves. K+ down to 4.3. Will repeat EKG. Thank you for involving us in the care of this patient. Please do not hesitate to call if additional questions arise. If after hours please call [phone].

## 2022-07-30 ENCOUNTER — APPOINTMENT (OUTPATIENT)
Dept: NON INVASIVE DIAGNOSTICS | Age: 67
DRG: 640 | End: 2022-07-30
Attending: NURSE PRACTITIONER
Payer: MEDICARE

## 2022-07-30 LAB
ALBUMIN SERPL-MCNC: 3.2 G/DL (ref 3.5–5)
ANION GAP SERPL CALC-SCNC: 7 MMOL/L (ref 5–15)
BUN SERPL-MCNC: 17 MG/DL (ref 6–20)
BUN/CREAT SERPL: 3 (ref 12–20)
CA-I BLD-MCNC: 8.9 MG/DL (ref 8.5–10.1)
CHLORIDE SERPL-SCNC: 100 MMOL/L (ref 97–108)
CO2 SERPL-SCNC: 30 MMOL/L (ref 21–32)
CREAT SERPL-MCNC: 5.61 MG/DL (ref 0.55–1.02)
GLUCOSE BLD STRIP.AUTO-MCNC: 136 MG/DL (ref 65–117)
GLUCOSE BLD STRIP.AUTO-MCNC: 143 MG/DL (ref 65–117)
GLUCOSE BLD STRIP.AUTO-MCNC: 161 MG/DL (ref 65–117)
GLUCOSE BLD STRIP.AUTO-MCNC: 168 MG/DL (ref 65–117)
GLUCOSE SERPL-MCNC: 173 MG/DL (ref 65–100)
PERFORMED BY, TECHID: ABNORMAL
PHOSPHATE SERPL-MCNC: 3.5 MG/DL (ref 2.6–4.7)
POTASSIUM SERPL-SCNC: 3.6 MMOL/L (ref 3.5–5.1)
SODIUM SERPL-SCNC: 137 MMOL/L (ref 136–145)

## 2022-07-30 PROCEDURE — 74011250637 HC RX REV CODE- 250/637: Performed by: INTERNAL MEDICINE

## 2022-07-30 PROCEDURE — 80069 RENAL FUNCTION PANEL: CPT

## 2022-07-30 PROCEDURE — 93308 TTE F-UP OR LMTD: CPT

## 2022-07-30 PROCEDURE — 36415 COLL VENOUS BLD VENIPUNCTURE: CPT

## 2022-07-30 PROCEDURE — 65270000029 HC RM PRIVATE

## 2022-07-30 PROCEDURE — 74011250636 HC RX REV CODE- 250/636: Performed by: INTERNAL MEDICINE

## 2022-07-30 PROCEDURE — 74011250637 HC RX REV CODE- 250/637: Performed by: NURSE PRACTITIONER

## 2022-07-30 PROCEDURE — 82962 GLUCOSE BLOOD TEST: CPT

## 2022-07-30 PROCEDURE — 74011000250 HC RX REV CODE- 250: Performed by: INTERNAL MEDICINE

## 2022-07-30 PROCEDURE — 74011636637 HC RX REV CODE- 636/637: Performed by: INTERNAL MEDICINE

## 2022-07-30 RX ADMIN — INSULIN LISPRO 2 UNITS: 100 INJECTION, SOLUTION INTRAVENOUS; SUBCUTANEOUS at 08:40

## 2022-07-30 RX ADMIN — CARVEDILOL 25 MG: 12.5 TABLET, FILM COATED ORAL at 09:48

## 2022-07-30 RX ADMIN — SEVELAMER CARBONATE 800 MG: 800 TABLET, FILM COATED ORAL at 17:16

## 2022-07-30 RX ADMIN — FUROSEMIDE 20 MG: 40 TABLET ORAL at 09:48

## 2022-07-30 RX ADMIN — SODIUM CHLORIDE, PRESERVATIVE FREE 10 ML: 5 INJECTION INTRAVENOUS at 21:03

## 2022-07-30 RX ADMIN — HEPARIN SODIUM 5000 UNITS: 5000 INJECTION INTRAVENOUS; SUBCUTANEOUS at 05:59

## 2022-07-30 RX ADMIN — ISOSORBIDE DINITRATE 10 MG: 10 TABLET ORAL at 09:48

## 2022-07-30 RX ADMIN — HEPARIN SODIUM 5000 UNITS: 5000 INJECTION INTRAVENOUS; SUBCUTANEOUS at 21:03

## 2022-07-30 RX ADMIN — PANTOPRAZOLE SODIUM 40 MG: 40 TABLET, DELAYED RELEASE ORAL at 09:48

## 2022-07-30 RX ADMIN — SEVELAMER CARBONATE 800 MG: 800 TABLET, FILM COATED ORAL at 09:48

## 2022-07-30 RX ADMIN — SEVELAMER CARBONATE 800 MG: 800 TABLET, FILM COATED ORAL at 13:05

## 2022-07-30 RX ADMIN — INSULIN LISPRO 2 UNITS: 100 INJECTION, SOLUTION INTRAVENOUS; SUBCUTANEOUS at 17:16

## 2022-07-30 RX ADMIN — FERROUS SULFATE TAB 325 MG (65 MG ELEMENTAL FE) 325 MG: 325 (65 FE) TAB at 09:48

## 2022-07-30 RX ADMIN — SODIUM CHLORIDE, PRESERVATIVE FREE 10 ML: 5 INJECTION INTRAVENOUS at 06:00

## 2022-07-30 RX ADMIN — CARVEDILOL 25 MG: 12.5 TABLET, FILM COATED ORAL at 17:16

## 2022-07-30 RX ADMIN — SODIUM CHLORIDE, PRESERVATIVE FREE 10 ML: 5 INJECTION INTRAVENOUS at 13:07

## 2022-07-30 RX ADMIN — HEPARIN SODIUM 5000 UNITS: 5000 INJECTION INTRAVENOUS; SUBCUTANEOUS at 13:05

## 2022-07-30 RX ADMIN — ATORVASTATIN CALCIUM 40 MG: 40 TABLET, FILM COATED ORAL at 21:00

## 2022-07-30 RX ADMIN — Medication 2000 UNITS: at 09:48

## 2022-07-30 NOTE — PROGRESS NOTES
[facility] Hospitalist Progress Note  Sundeep Funes MD    Date:2022       Room:[facility]  Patient Name:Hanh Green     YOB: 1955     Age:67 y.o.    22 admission course  Bhupinder Liz is a 75 y.o. female with PMH of ESRD, CAD, PAD, hypertension, diabetes, anemia and blind. She presented to the ED with chief complaint of pain at multiple sites. On my evaluation, she was in tears and saying that have been having flank pain , radiating to right abdomen for the past 2 days, associated with nausea, vomiting and cold sweats. No fever or chills. Pain is severe and intermittent, no known exacerbating or relieving factor. Taken tylenol without symptoms improvement. Per ED physician, however, she complained of chest pain and leg acheness, which she did not complained to me. She states that she did miss her hemodialysis today since she has not been feeling well. She reports sill producing little amount of urine. In the ED, noted K of 7.2 initially. Given IV insulin and kayexalate x 1. Repeat K is 6.4. Another dose of kayexalate given, repeat pending. Subjective      Feels well. Madhuri Raker to go home. No CP, SOB. BP rx being adjusted by Renal.    Objective         Vitals Last 24 Hours:  TEMPERATURE:  Temp  Av.4 °F (36.9 °C)  Min: 97.9 °F (36.6 °C)  Max: 99.2 °F (37.3 °C)  RESPIRATIONS RANGE: Resp  Av.7  Min: 16  Max: 18  PULSE OXIMETRY RANGE: SpO2  Av %  Min: 98 %  Max: 100 %  PULSE RANGE: Pulse  Av.9  Min: 79  Max: 97  BLOOD PRESSURE RANGE: Systolic (82XUH), OVE:478 , Min:98 , ZXJ:749   ; Diastolic (78IHQ), NTS:52, Min:49, Max:72    I/O (24Hr): Intake/Output Summary (Last 24 hours) at 2022 1434  Last data filed at 2022 2344  Gross per 24 hour   Intake 350 ml   Output 3000 ml   Net -2650 ml       Objective:  General Appearance:  Comfortable. Vital signs: (most recent): Blood pressure (!) 98/57, pulse 86, temperature 97.9 °F (36.6 °C), resp.  rate 18, height 5' 5\" (1.651 m), weight 61.2 kg (135 lb), SpO2 100 %, not currently breastfeeding. HEENT: Normal HEENT exam.    Lungs:  Normal effort. Heart: S1 normal and S2 normal.    Abdomen: Abdomen is soft. Bowel sounds are normal.     Extremities: Normal range of motion. Neurological: Patient is alert and oriented to person, place and time. Pupils:  Pupils are equal, round, and reactive to light. Skin:  Warm and dry. Labs/Imaging/Diagnostics    Labs:  CBC:  Recent Labs     07/29/22  0937 07/27/22  1532   WBC 6.3 6.8   RBC 2.89* 2.86*   HGB 9.4* 9.2*   HCT 28.7* 28.6*   MCV 99.3* 100.0*   RDW 16.4* 17.0*   * 138*       CHEMISTRIES:  Recent Labs     07/30/22  0626 07/29/22  0937 07/28/22  0830    137  136 139  139   K 3.6 4.3  4.3 5.9*  5.9*    101  100 103  103   CO2 30 26  26 22  23   BUN 17 41*  41* 95*  94*   CA 8.9 9.2  9.2 9.7  9.8   PHOS 3.5 4.3 4.6   MG  --  2.4  --      PT/INR:No results for input(s): INR, INREXT, INREXT in the last 72 hours. No lab exists for component: PROTIME  APTT:No results for input(s): APTT in the last 72 hours. LIVER PROFILE:  Recent Labs     07/29/22  0937 07/27/22  1720 07/27/22  1532   AST 23 20 21   ALT 31 25 26       Lab Results   Component Value Date/Time    ALT (SGPT) 31 07/29/2022 09:37 AM    AST (SGOT) 23 07/29/2022 09:37 AM    Alk. phosphatase 116 07/29/2022 09:37 AM    Bilirubin, direct 0.3 (H) 07/27/2022 05:20 PM    Bilirubin, total 0.8 07/29/2022 09:37 AM     Assessment & Plan  7/27/22 admission course  Alma Martinez is a 75 y.o. female with PMH of ESRD, CAD, PAD, hypertension, diabetes, anemia and blind. She presented to the ED with chief complaint of pain at multiple sites. On my evaluation, she was in tears and saying that have been having flank pain , radiating to right abdomen for the past 2 days, associated with nausea, vomiting and cold sweats. No fever or chills. Pain is severe and intermittent, no known exacerbating or relieving factor. Taken tylenol without symptoms improvement. Per ED physician, however, she complained of chest pain and leg acheness, which she did not complained to me. She states that she did miss her hemodialysis today since she has not been feeling well. She reports sill producing little amount of urine. In the ED, noted K of 7.2 initially. Given IV insulin and kayexalate x 1. Repeat K is 6.4. Another dose of kayexalate given, repeat pending. 1) ESRD complicated by hyperkalemia and fluid overload and pulmonary edema. Development in the setting of missed hemodialysis and non-compliance + worsening EF. Hemodialysis per nephrology    2) Right sided abdominal pains, CT findings as above suggestive of Massively dilated gallbladder. HIDA scan   Seen by Dr. Soria/Surgery --> No surgical need    3) Cardiovascular issues including coronary disease, hypertension.  See note by [facility]    4) Diabetes mellitus    5) DVT prophylaxis with heparin      Electronically signed by Idris Burger MD on 7/30/2022 at 8:31 AM

## 2022-07-30 NOTE — PROGRESS NOTES
Nephrology Consult    Patient: Keiko Arreguin MRN: [id number]  SSN: [id number]    YOB: 1955  Age: 75 y.o. Sex: female      Subjective:     Patient seen in her room this morning.  -States she is feeling fine, enjoying her breakfast.      Comfortably resting in bed .  Not in any distress , not on any oxygen.    -Spoke to patient's nurse at bedside, she had echocardiogram done this morning otherwise no acute events reported from overnight      Past Medical History:   Diagnosis Date    Blind     CKD (chronic kidney disease)     HTN (hypertension)     Retinopathy     Type II or unspecified type diabetes mellitus with renal manifestations, uncontrolled(250.42) ([facility])      Past Surgical History:   Procedure Laterality Date    HX CORONARY ARTERY BYPASS GRAFT      HX GYN      hysterectomy       Family History   Problem Relation Age of Onset    Diabetes Maternal Aunt     Diabetes Maternal Grandmother      Social History     Tobacco Use    Smoking status: Never    Smokeless tobacco: Never   Substance Use Topics    Alcohol use: No      Current Facility-Administered Medications   Medication Dose Route Frequency Provider Last Rate Last Admin    hydrALAZINE (APRESOLINE) tablet 50 mg  50 mg Oral TID Bernadette Agrawal MD   50 mg at 07/29/22 2208    isosorbide dinitrate (ISORDIL) tablet 10 mg  10 mg Oral TID Cintia Ortiz NP   10 mg at 07/30/22 0948    amLODIPine (NORVASC) tablet 10 mg  10 mg Oral DAILY Wayne Arnold MD        carvediloL (COREG) tablet 25 mg  25 mg Oral BID WITH MEALS Wayne Arnold MD   25 mg at 07/30/22 8899    cholecalciferol (VITAMIN D3) (1000 Units /25 mcg) tablet 2,000 Units  2,000 Units Oral DAILY Yecenia Gold MD   2,000 Units at 07/30/22 7652    [Held by provider] clopidogreL (PLAVIX) tablet 75 mg  75 mg Oral DAILY Wayne Arnold MD        ferrous sulfate tablet 325 mg  325 mg Oral DAILY WITH BREAKFAST Yecenia Gold MD   325 mg at 07/30/22 0948    furosemide (LASIX) tablet 20 mg  20 mg Oral DAILY Sheryl Arnold MD   20 mg at 07/30/22 0948    [Held by provider] insulin glargine (LANTUS) injection 8 Units  8 Units SubCUTAneous DAILY Sheryl Arnold MD        pantoprazole (PROTONIX) tablet 40 mg  40 mg Oral ACB Jaylin Santoyo MD   40 mg at 07/30/22 0948    atorvastatin (LIPITOR) tablet 40 mg  40 mg Oral QHS Jaylin Santoyo MD   40 mg at 07/29/22 2208    sodium chloride (NS) flush 5-40 mL  5-40 mL IntraVENous Q8H Sheryl Arnold MD   10 mL at 07/30/22 0600    sodium chloride (NS) flush 5-40 mL  5-40 mL IntraVENous PRN Sheryl Arnold MD        acetaminophen (TYLENOL) tablet 650 mg  650 mg Oral Q6H PRN Jaylin Santoyo MD   650 mg at 07/29/22 1843    Or    acetaminophen (TYLENOL) suppository 650 mg  650 mg Rectal Q6H PRN Sheryl Arnold MD        polyethylene glycol (MIRALAX) packet 17 g  17 g Oral DAILY PRN Sheryl Arnold MD        ondansetron (ZOFRAN ODT) tablet 4 mg  4 mg Oral Q8H PRN Sheryl Arnold MD        Or    ondansetron (ZOFRAN) injection 4 mg  4 mg IntraVENous Q6H PRN Sheryl Arnold MD        heparin (porcine) injection 5,000 Units  5,000 Units SubCUTAneous Q8H Jaylin Santoyo MD   5,000 Units at 07/30/22 0559    labetaloL (NORMODYNE;TRANDATE) 20 mg/4 mL (5 mg/mL) injection 10 mg  10 mg IntraVENous Q4H PRN Sheryl Arnold MD        glucose chewable tablet 16 g  4 Tablet Oral PRN Sheryl Arnold MD        glucagon (GLUCAGEN) injection 1 mg  1 mg IntraMUSCular PRN Sheryl Arnold MD        dextrose 10% infusion 0-250 mL  0-250 mL IntraVENous PRN Sheryl Arnold MD        insulin lispro (HUMALOG) injection   SubCUTAneous AC&HS Frank Burnett MD   2 Units at 07/30/22 0840    sevelamer carbonate (RENVELA) tab 800 mg  800 mg Oral TID WITH MEALS Jairo Cody MD   800 mg at 07/30/22 0948        No Known Allergies    Review of Systems:  A comprehensive review of systems was negative except for that written in the History of Present Illness.     Objective:     Vitals:    07/30/22 0311 07/30/22 0336 07/30/22 0400 07/30/22 0825   BP:  (!) 105/49  112/65   Pulse: 79 86 86 79   Resp:  18  18   Temp:  98.5 °F (36.9 °C)  98.2 °F (36.8 °C)   TempSrc:       SpO2:  100%  99%   Weight:       Height:            Physical Exam:  General: NAD  Eyes: sclera anicteric  Oral Cavity: No thrush or ulcers  Neck: no JVD  Chest: good bilateral air entry and basal crackles  Heart: normal sounds  Abdomen: soft and non tender   : no cohen  Lower Extremities: no edema ,   Skin: no rash  Neuro: intact , aao*3 , legally blind   Psychiatric: non-depressed        Assessment:     Hospital Problems  Date Reviewed: 4/24/2022            Codes Class Noted POA    Hyperkalemia ICD-10-CM: E87.5  ICD-9-CM: 276.7  7/28/2022 Unknown         LABS REVIEWED TODAY    Plan:     1 ESRD. -On hemodialysis Monday Wednesday Friday at [address] renal care in [facility].    -She was last dialyzed on 7/20 in her outpatient center.    -She came with volume overload/BUN 91 and creatinine 14.40 and K of 7.2.    -Status post dialyzed on 7/28 and 7/29  removed 4 L during each session .  -She has right upper arm AV fistula is a dialysis access. Next HD on 8/1/22    2. Hyperkalemia. -Due to missed dialysis sessions.    -On admission K was 7.2.    -Medically treated twice in the ER with Kayexalate and K is 5.9 on 7/28    -dialyzed with 1K bath for first hour then 2K bath for the rest of the dialysis treatment.  -K 4.3>> 3.6 today , s/p HD yesterday       3. Hypertension.    -Malignant/accelerated. -Volume related and medical noncompliance also. -Blood pressure systolic was 809.    -dialyzed with UF of 4 L on 7/28 and 7/29   -BP much better today , infact on low side, will give coreg and lasix as scheduled,  Hold amlodipine today and am dose of hydralazine as systolic close to 651 , discussed with Nurse     4. Anemia.    -Hemoglobin 9.2.      5.  Renal osteodystrophy.    -Her calcium is 10.2.    -phosphorus level 4.3. I will continue phosphorus binders.     Signed By: Satish Call MD     July 30, 2022

## 2022-07-30 NOTE — PROGRESS NOTES
Problem: Pressure Injury - Risk of  Goal: *Prevention of pressure injury  Description: Document Storm Scale and appropriate interventions in the flowsheet. Outcome: Progressing Towards Goal  Note: Pressure Injury Interventions:       Moisture Interventions: Absorbent underpads    Activity Interventions: Chair cushion, PT/OT evaluation    Mobility Interventions: PT/OT evaluation    Nutrition Interventions: Document food/fluid/supplement intake    Friction and Shear Interventions: Minimize layers                Problem: Patient Education: Go to Patient Education Activity  Goal: Patient/Family Education  Outcome: Progressing Towards Goal     Problem: Falls - Risk of  Goal: *Absence of Falls  Description: Document Alma Delia Fall Risk and appropriate interventions in the flowsheet.   Outcome: Progressing Towards Goal  Note: Fall Risk Interventions:  Mobility Interventions: Bed/chair exit alarm, PT Consult for mobility concerns, PT Consult for assist device competence              Elimination Interventions: Call light in reach, Bed/chair exit alarm    History of Falls Interventions: Bed/chair exit alarm         Problem: Patient Education: Go to Patient Education Activity  Goal: Patient/Family Education  Outcome: Progressing Towards Goal

## 2022-07-31 VITALS
TEMPERATURE: 98 F | OXYGEN SATURATION: 100 % | SYSTOLIC BLOOD PRESSURE: 109 MMHG | HEART RATE: 85 BPM | BODY MASS INDEX: 24.9 KG/M2 | RESPIRATION RATE: 18 BRPM | DIASTOLIC BLOOD PRESSURE: 57 MMHG | HEIGHT: 65 IN | WEIGHT: 149.47 LBS

## 2022-07-31 LAB
ALBUMIN SERPL-MCNC: 3.1 G/DL (ref 3.5–5)
ANION GAP SERPL CALC-SCNC: 8 MMOL/L (ref 5–15)
BUN SERPL-MCNC: 32 MG/DL (ref 6–20)
BUN/CREAT SERPL: 4 (ref 12–20)
CA-I BLD-MCNC: 9.2 MG/DL (ref 8.5–10.1)
CHLORIDE SERPL-SCNC: 98 MMOL/L (ref 97–108)
CO2 SERPL-SCNC: 28 MMOL/L (ref 21–32)
CREAT SERPL-MCNC: 7.76 MG/DL (ref 0.55–1.02)
ECHO EST RA PRESSURE: 3 MMHG
ECHO LA AREA 2C: 12 CM2
ECHO LA AREA 4C: 13.6 CM2
ECHO LA MAJOR AXIS: 4 CM
ECHO LA MINOR AXIS: 4.5 CM
ECHO LA VOL 4C: 24 ML (ref 22–52)
ECHO LA VOL BP: 31 ML (ref 22–52)
ECHO LA VOL/BSA BIPLANE: 19 ML/M2 (ref 16–34)
ECHO LA VOLUME INDEX A4C: 14 ML/M2 (ref 16–34)
ECHO LV EDV A2C: 70 ML
ECHO LV EDV A4C: 118 ML
ECHO LV EDV INDEX A4C: 71 ML/M2
ECHO LV EDV NDEX A2C: 42 ML/M2
ECHO LV EJECTION FRACTION A2C: 11 %
ECHO LV EJECTION FRACTION A4C: 41 %
ECHO LV EJECTION FRACTION BIPLANE: 28 % (ref 55–100)
ECHO LV ESV A2C: 62 ML
ECHO LV ESV A4C: 70 ML
ECHO LV ESV INDEX A2C: 37 ML/M2
ECHO LV ESV INDEX A4C: 42 ML/M2
ECHO LV FRACTIONAL SHORTENING: 14 % (ref 28–44)
ECHO LV INTERNAL DIMENSION DIASTOLE INDEX: 2.93 CM/M2
ECHO LV INTERNAL DIMENSION DIASTOLIC: 4.9 CM (ref 3.9–5.3)
ECHO LV INTERNAL DIMENSION SYSTOLIC INDEX: 2.51 CM/M2
ECHO LV INTERNAL DIMENSION SYSTOLIC: 4.2 CM
ECHO LV IVSD: 1.3 CM (ref 0.6–0.9)
ECHO LV MASS 2D: 267.9 G (ref 67–162)
ECHO LV MASS INDEX 2D: 160.4 G/M2 (ref 43–95)
ECHO LV POSTERIOR WALL DIASTOLIC: 1.4 CM (ref 0.6–0.9)
ECHO LV RELATIVE WALL THICKNESS RATIO: 0.57
ECHO MV EROA PISA: 28 CM2
ECHO MV REGURGITANT ALIASING (NYQUIST) VELOCITY: 92 CM/S
ECHO MV REGURGITANT PEAK GRADIENT: 44 MMHG
ECHO MV REGURGITANT PEAK VELOCITY: 3.3 M/S
ECHO MV REGURGITANT RADIUS PISA: 0.4 CM
ECHO MV REGURGITANT VOLUME PISA: 2655.6 ML
ECHO MV REGURGITANT VTIA: 94.8 CM
ECHO PULMONARY ARTERY END DIASTOLIC PRESSURE: 11 MMHG
ECHO PV MAX VELOCITY: 0.8 M/S
ECHO PV PEAK GRADIENT: 2 MMHG
ECHO PV REGURGITANT MAX VELOCITY: 1.7 M/S
ECHO RA AREA 4C: 9.3 CM2
ECHO RA END SYSTOLIC VOLUME APICAL 4 CHAMBER INDEX BSA: 11 ML/M2
ECHO RA VOLUME: 19 ML
ECHO RIGHT VENTRICULAR SYSTOLIC PRESSURE (RVSP): 25 MMHG
ECHO RV BASAL DIMENSION: 2.8 CM
ECHO RV MID DIMENSION: 2.6 CM
ECHO TV REGURGITANT MAX VELOCITY: 2.37 M/S
ECHO TV REGURGITANT PEAK GRADIENT: 22 MMHG
GLUCOSE BLD STRIP.AUTO-MCNC: 170 MG/DL (ref 65–117)
GLUCOSE BLD STRIP.AUTO-MCNC: 174 MG/DL (ref 65–117)
GLUCOSE BLD STRIP.AUTO-MCNC: 184 MG/DL (ref 65–117)
GLUCOSE SERPL-MCNC: 169 MG/DL (ref 65–100)
PERFORMED BY, TECHID: ABNORMAL
PHOSPHATE SERPL-MCNC: 3.8 MG/DL (ref 2.6–4.7)
POTASSIUM SERPL-SCNC: 3.9 MMOL/L (ref 3.5–5.1)
SODIUM SERPL-SCNC: 134 MMOL/L (ref 136–145)

## 2022-07-31 PROCEDURE — 74011250637 HC RX REV CODE- 250/637: Performed by: INTERNAL MEDICINE

## 2022-07-31 PROCEDURE — 80069 RENAL FUNCTION PANEL: CPT

## 2022-07-31 PROCEDURE — 74011000250 HC RX REV CODE- 250: Performed by: INTERNAL MEDICINE

## 2022-07-31 PROCEDURE — 36415 COLL VENOUS BLD VENIPUNCTURE: CPT

## 2022-07-31 PROCEDURE — 74011250636 HC RX REV CODE- 250/636: Performed by: INTERNAL MEDICINE

## 2022-07-31 PROCEDURE — 74011636637 HC RX REV CODE- 636/637: Performed by: INTERNAL MEDICINE

## 2022-07-31 PROCEDURE — 74011250637 HC RX REV CODE- 250/637: Performed by: NURSE PRACTITIONER

## 2022-07-31 PROCEDURE — 82962 GLUCOSE BLOOD TEST: CPT

## 2022-07-31 RX ORDER — ISOSORBIDE DINITRATE 10 MG/1
10 TABLET ORAL 3 TIMES DAILY
Qty: 90 TABLET | Refills: 0 | Status: SHIPPED | OUTPATIENT
Start: 2022-07-31

## 2022-07-31 RX ADMIN — PANTOPRAZOLE SODIUM 40 MG: 40 TABLET, DELAYED RELEASE ORAL at 08:01

## 2022-07-31 RX ADMIN — AMLODIPINE BESYLATE 10 MG: 5 TABLET ORAL at 08:01

## 2022-07-31 RX ADMIN — CARVEDILOL 25 MG: 12.5 TABLET, FILM COATED ORAL at 08:00

## 2022-07-31 RX ADMIN — ISOSORBIDE DINITRATE 10 MG: 10 TABLET ORAL at 08:01

## 2022-07-31 RX ADMIN — ISOSORBIDE DINITRATE 10 MG: 10 TABLET ORAL at 15:04

## 2022-07-31 RX ADMIN — SEVELAMER CARBONATE 800 MG: 800 TABLET, FILM COATED ORAL at 15:10

## 2022-07-31 RX ADMIN — Medication 2000 UNITS: at 08:01

## 2022-07-31 RX ADMIN — SEVELAMER CARBONATE 800 MG: 800 TABLET, FILM COATED ORAL at 08:01

## 2022-07-31 RX ADMIN — FUROSEMIDE 20 MG: 40 TABLET ORAL at 08:01

## 2022-07-31 RX ADMIN — HEPARIN SODIUM 5000 UNITS: 5000 INJECTION INTRAVENOUS; SUBCUTANEOUS at 05:30

## 2022-07-31 RX ADMIN — INSULIN LISPRO 2 UNITS: 100 INJECTION, SOLUTION INTRAVENOUS; SUBCUTANEOUS at 08:01

## 2022-07-31 RX ADMIN — HYDRALAZINE HYDROCHLORIDE 50 MG: 50 TABLET, FILM COATED ORAL at 15:04

## 2022-07-31 RX ADMIN — FERROUS SULFATE TAB 325 MG (65 MG ELEMENTAL FE) 325 MG: 325 (65 FE) TAB at 08:01

## 2022-07-31 RX ADMIN — HYDRALAZINE HYDROCHLORIDE 50 MG: 50 TABLET, FILM COATED ORAL at 08:01

## 2022-07-31 RX ADMIN — SODIUM CHLORIDE, PRESERVATIVE FREE 10 ML: 5 INJECTION INTRAVENOUS at 05:30

## 2022-07-31 RX ADMIN — HEPARIN SODIUM 5000 UNITS: 5000 INJECTION INTRAVENOUS; SUBCUTANEOUS at 15:04

## 2022-07-31 NOTE — PROGRESS NOTES
Report given BY Claude Cortes. Pt is alert and oriented.  Eating breakfast and has taken pills whole with breakfast.

## 2022-07-31 NOTE — PROGRESS NOTES
Patient and son received discharge paperwork. Tele and IV removed. Patient to discharge home self care.

## 2022-07-31 NOTE — PROGRESS NOTES
Upon discharge pt states she had money taken by security when she came into the ER. There is no documentation of money or any other property being collected. Pt states she gave them 204 dollars. Notified security they do not have any record. Notified charge nurse and nursing supervisor. Gave pt's son, pt advocate phone number to call.

## 2022-07-31 NOTE — PROGRESS NOTES
Nephrology Consult    Patient: Enrique Srivastava MRN: [id number]  SSN: [id number]    YOB: 1955  Age: 75 y.o. Sex: female      Subjective:     Patient seen in her room this morning. Patient is alert awake oriented, not in any acute distress.   She is doing fine had no acute complaints    Past Medical History:   Diagnosis Date    Blind     CKD (chronic kidney disease)     HTN (hypertension)     Retinopathy     Type II or unspecified type diabetes mellitus with renal manifestations, uncontrolled(250.42) ([facility])      Past Surgical History:   Procedure Laterality Date    HX CORONARY ARTERY BYPASS GRAFT      HX GYN      hysterectomy       Family History   Problem Relation Age of Onset    Diabetes Maternal Aunt     Diabetes Maternal Grandmother      Social History     Tobacco Use    Smoking status: Never    Smokeless tobacco: Never   Substance Use Topics    Alcohol use: No      Current Facility-Administered Medications   Medication Dose Route Frequency Provider Last Rate Last Admin    hydrALAZINE (APRESOLINE) tablet 50 mg  50 mg Oral TID Tyler Kiran MD   50 mg at 07/31/22 0801    isosorbide dinitrate (ISORDIL) tablet 10 mg  10 mg Oral TID Cintia Ortiz NP   10 mg at 07/31/22 0801    amLODIPine (NORVASC) tablet 10 mg  10 mg Oral DAILY Denis Arnold MD   10 mg at 07/31/22 0801    carvediloL (COREG) tablet 25 mg  25 mg Oral BID WITH MEALS Denis Arnold MD   25 mg at 07/31/22 0800    cholecalciferol (VITAMIN D3) (1000 Units /25 mcg) tablet 2,000 Units  2,000 Units Oral DAILY Lansing Schwab, MD   2,000 Units at 07/31/22 0801    [Held by provider] clopidogreL (PLAVIX) tablet 75 mg  75 mg Oral DAILY Denis Arnold MD        ferrous sulfate tablet 325 mg  325 mg Oral DAILY WITH BREAKFAST Lansing Schwab, MD   325 mg at 07/31/22 0801    furosemide (LASIX) tablet 20 mg  20 mg Oral DAILY Lansing Schwab, MD   20 mg at 07/31/22 0801    [Held by provider] insulin glargine (LANTUS) injection 8 Units  8 Units SubCUTAneous DAILY Itzel Arnold MD        pantoprazole (PROTONIX) tablet 40 mg  40 mg Oral ACB Isabel Deluna MD   40 mg at 07/31/22 0801    atorvastatin (LIPITOR) tablet 40 mg  40 mg Oral QHS Isabel Deluna MD   40 mg at 07/30/22 2100    sodium chloride (NS) flush 5-40 mL  5-40 mL IntraVENous Q8H Itzel Arnold MD   10 mL at 07/31/22 0530    sodium chloride (NS) flush 5-40 mL  5-40 mL IntraVENous PRN Itzel Arnold MD        acetaminophen (TYLENOL) tablet 650 mg  650 mg Oral Q6H PRN Isabel Deluna MD   650 mg at 07/29/22 1843    Or    acetaminophen (TYLENOL) suppository 650 mg  650 mg Rectal Q6H PRN Itzel Arnold MD        polyethylene glycol (MIRALAX) packet 17 g  17 g Oral DAILY PRN Itzel Arnold MD        ondansetron (ZOFRAN ODT) tablet 4 mg  4 mg Oral Q8H PRN Itzel Arnold MD        Or    ondansetron (ZOFRAN) injection 4 mg  4 mg IntraVENous Q6H PRN Itzel Arnold MD        heparin (porcine) injection 5,000 Units  5,000 Units SubCUTAneous Q8H Isabel Deluna MD   5,000 Units at 07/31/22 0530    labetaloL (NORMODYNE;TRANDATE) 20 mg/4 mL (5 mg/mL) injection 10 mg  10 mg IntraVENous Q4H PRN Itzel Arnold MD        glucose chewable tablet 16 g  4 Tablet Oral PRN Itzel Arnold MD        glucagon (GLUCAGEN) injection 1 mg  1 mg IntraMUSCular PRN Itzel Arnold MD        dextrose 10% infusion 0-250 mL  0-250 mL IntraVENous PRN Itzel Arnold MD        insulin lispro (HUMALOG) injection   SubCUTAneous AC&HS Lance Hallman MD   2 Units at 07/31/22 0801    sevelamer carbonate (RENVELA) tab 800 mg  800 mg Oral TID WITH MEALS Anatoliy Singh MD   800 mg at 07/31/22 0801        No Known Allergies    Review of Systems:  A comprehensive review of systems was negative except for that written in the History of Present Illness.     Objective:     Vitals:    07/31/22 0447 07/31/22 0525 07/31/22 0533 07/31/22 0730   BP: (!) 146/74  138/70 130/61   Pulse: 77   73   Resp: 22   16   Temp: 98.7 °F (37.1 °C)   98.2 °F (36.8 °C) TempSrc:       SpO2: 98%   100%   Weight:  67.8 kg (149 lb 7.6 oz)     Height:            Physical Exam:  General: NAD  Eyes: sclera anicteric , legally blind   Oral Cavity: No thrush or ulcers  Neck: no JVD  Chest: good bilateral air entry and basal crackles  Heart: normal sounds  Abdomen: soft and non tender   Lower Extremities: no edema ,   Skin: no rash  Neuro: intact , aao*3 , legally blind   Psychiatric: non-depressed        Assessment:     Hospital Problems  Date Reviewed: 4/24/2022            Codes Class Noted POA    Hyperkalemia ICD-10-CM: E87.5  ICD-9-CM: 276.7  7/28/2022 Unknown         LABS REVIEWED TODAY     Recent Results (from the past 24 hour(s))   GLUCOSE, POC    Collection Time: 07/30/22 12:25 PM   Result Value Ref Range    Glucose (POC) 136 (H) 65 - 117 mg/dL    Performed by [place] Z[place]t May    GLUCOSE, POC    Collection Time: 07/30/22  5:14 PM   Result Value Ref Range    Glucose (POC) 161 (H) 65 - 117 mg/dL    Performed by Aaron Anderson    GLUCOSE, POC    Collection Time: 07/30/22  8:58 PM   Result Value Ref Range    Glucose (POC) 143 (H) 65 - 117 mg/dL    Performed by Chi Kent    RENAL FUNCTION PANEL    Collection Time: 07/31/22  7:29 AM   Result Value Ref Range    Sodium 134 (L) 136 - 145 mmol/L    Potassium 3.9 3.5 - 5.1 mmol/L    Chloride 98 97 - 108 mmol/L    CO2 28 21 - 32 mmol/L    Anion gap 8 5 - 15 mmol/L    Glucose 169 (H) 65 - 100 mg/dL    BUN 32 (H) 6 - 20 mg/dL    Creatinine 7.76 (H) 0.55 - 1.02 mg/dL    BUN/Creatinine ratio 4 (L) 12 - 20      GFR est AA 6 (L) >60 ml/min/1.73m2    GFR est non-AA 5 (L) >60 ml/min/1.73m2    Calcium 9.2 8.5 - 10.1 mg/dL    Phosphorus 3.8 2.6 - 4.7 mg/dL    Albumin 3.1 (L) 3.5 - 5.0 g/dL   GLUCOSE, POC    Collection Time: 07/31/22  7:35 AM   Result Value Ref Range    Glucose (POC) 174 (H) 65 - 117 mg/dL    Performed by Pennye Dial          Plan:     1 ESRD. -On hemodialysis Monday Wednesday Friday at [address] renal care in [facility]. -She was last dialyzed on 7/20 in her outpatient center.    -She came with volume overload/BUN 91 and creatinine 14.40 and K of 7.2.    -Status post dialyzed on 7/28 and 7/29  removed 4 L during each session .  -She has right upper arm AV fistula is a dialysis access. Next HD on 8/1/22    2. Hyperkalemia. - levels better today . -Due to missed dialysis sessions.    -On admission K was 7.2.    -Medically treated twice in the ER with Kayexalate and K is 5.9 on 7/28    -dialyzed with 1K bath for first hour then 2K bath for the rest of the dialysis treatment.  -K 4.3>> 3.6 today , s/p HD 0n 7/29       3. Hypertension.    -Malignant/accelerated. -Volume related and medical noncompliance also. -Blood pressure systolic was 832.    -dialyzed with UF of 4 L on 7/28 and 7/29   -BP much better today     4. Anemia.    -Hemoglobin 9.2. IV epo with HD     5. Renal osteodystrophy.    -Her calcium is 10.2.    -phosphorus level 4.3. I will continue phosphorus binders. 6.Mild Hyponatremia- due to hypervolemia   HD on Monday with goal fluid removal of up to 3 litres .     Signed By: Leo Bunn MD     July 31, 2022

## 2022-07-31 NOTE — PROGRESS NOTES
Problem: Patient Education: Go to Patient Education Activity  Goal: Patient/Family Education  Outcome: Progressing Towards Goal     Problem: Falls - Risk of  Goal: *Absence of Falls  Description: Document Samuel Soriano Fall Risk and appropriate interventions in the flowsheet. Outcome: Progressing Towards Goal  Note: Fall Risk Interventions:  Mobility Interventions: PT Consult for mobility concerns, PT Consult for assist device competence         Medication Interventions: Patient to call before getting OOB    Elimination Interventions: Call light in reach, Bed/chair exit alarm, Patient to call for help with toileting needs    History of Falls Interventions: Bed/chair exit alarm         Problem: Patient Education: Go to Patient Education Activity  Goal: Patient/Family Education  Outcome: Progressing Towards Goal     Problem: Falls - Risk of  Goal: *Absence of Falls  Description: Document Samuel Soriano Fall Risk and appropriate interventions in the flowsheet.   Outcome: Progressing Towards Goal  Note: Fall Risk Interventions:  Mobility Interventions: PT Consult for mobility concerns, PT Consult for assist device competence         Medication Interventions: Patient to call before getting OOB    Elimination Interventions: Call light in reach, Bed/chair exit alarm, Patient to call for help with toileting needs    History of Falls Interventions: Bed/chair exit alarm         Problem: Patient Education: Go to Patient Education Activity  Goal: Patient/Family Education  Outcome: Progressing Towards Goal

## 2022-07-31 NOTE — DISCHARGE SUMMARY
Hospitalist Discharge Summary     Patient ID:    Suni Jiménez  [id number]  75 y.o.  1955    Admit date: 7/27/2022    Discharge date : 7/31/2022      Final Diagnoses:     ESRD on HD with Hyperkalemia (resolved)  Fluid Overload due to ESRD and Worsening Acute/Chronic Systolic CHF  Ischemic Cardiomyopathy    Reason for Hospitalization/Hospital Course: Suni Jiménez is a 75 y.o. female with PMH of ESRD, CAD, PAD, hypertension, diabetes, anemia and blind. She presented to the ED with chief complaint of pain at multiple sites. On my evaluation, she was in tears and saying that have been having flank pain , radiating to right abdomen for the past 2 days, associated with nausea, vomiting and cold sweats. No fever or chills. Pain is severe and intermittent, no known exacerbating or relieving factor. Taken tylenol without symptoms improvement. Per ED physician, however, she complained of chest pain and leg acheness, which she did not complained to me. She states that she did miss her hemodialysis today since she has not been feeling well. She reports sill producing little amount of urine. In the ED, noted K of 7.2 initially. Given IV insulin and kayexalate x 1. Repeat K is 6.4. Another dose of kayexalate given, repeat pending. 1) ESRD complicated by hyperkalemia and fluid overload and pulmonary edema. Development in the setting of missed hemodialysis and non-compliance + worsening EF. Resolved with HD     2) Right sided abdominal pains, CT findings as above suggestive of Massively dilated gallbladder. Seen by Dr. Soria/Surgery --> No surgical need. Incidental; no active symptoms or abnormal findings on abd exam.     3) Cardiovascular issues including coronary disease, hypertension. See note by [facility]: Ischemic Cardiomyopathy with EF 15-20%.  Nuclear Stress Test with normal perfusion.  ------------------------    S: Asymptomatic of any SOB, CP, nausea. Erik Camarena to go home. Discharge Medications:   Current Discharge Medication List        START taking these medications    Details   isosorbide dinitrate (ISORDIL) 10 mg tablet Take 1 Tablet by mouth three (3) times daily. Qty: 90 Tablet, Refills: 0  Start date: 7/31/2022           CONTINUE these medications which have NOT CHANGED    Details   diclofenac (VOLTAREN) 1 % gel apply to foot and hands as needed for arthritis pain      cholecalciferol (VITAMIN D3) (1000 Units /25 mcg) tablet Take 2,000 Units by mouth daily. furosemide (LASIX) 20 mg tablet Take 20 mg by mouth daily. rosuvastatin (CRESTOR) 20 mg tablet TAKE 1 TABLET NIGHTLY (STOP 40 MG DOSE)  Qty: 90 Tablet, Refills: 3      amLODIPine (NORVASC) 10 mg tablet Take 10 mg by mouth daily. clopidogreL (PLAVIX) 75 mg tab Take 75 mg by mouth daily. sacubitriL-valsartan (Entresto) 49-51 mg tab tablet Take 1 Tab by mouth two (2) times a day. insulin glargine (Lantus Solostar U-100 Insulin) 100 unit/mL (3 mL) inpn Inject 8 units daily. Qty: 15 mL, Refills: 3      hydrALAZINE (APRESOLINE) 25 mg tablet Take 25 mg by mouth three (3) times daily. Refills: 1      pantoprazole (PROTONIX) 40 mg tablet     Associated Diagnoses: Type 2 diabetes mellitus with chronic kidney disease on chronic dialysis, with long-term current use of insulin (HCC)      acetaminophen (TYLENOL) 500 mg tablet Take  by mouth every six (6) hours as needed for Pain. Associated Diagnoses: Uncontrolled type 2 diabetes mellitus with diabetic nephropathy, with long-term current use of insulin      docusate sodium (COLACE) 100 mg capsule Take 100 mg by mouth as needed for Constipation. Associated Diagnoses: CHF (congestive heart failure) ([address] Ischemic cardiomyopathy; HTN (hypertension)      carvediloL (COREG) 25 mg tablet Take 25 mg by mouth two (2) times daily (with meals). Associated Diagnoses: CHF (congestive heart failure) ([address]  Ischemic cardiomyopathy; HTN (hypertension)      ferrous sulfate 325 mg (65 mg iron) tablet Take 325 mg by mouth as needed. Associated Diagnoses: CHF (congestive heart failure) ([address] Ischemic cardiomyopathy; HTN (hypertension)      sevelamer carbonate (RENVELA) 800 mg tab tab Take  by mouth three (3) times daily. Follow up Care:    1. Ledy Morley MD in 1-2 weeks. Follow-up Information       Follow up With Specialties Details Why Contact Info    Ledy Morley MD Family Medicine   [address] [place] [address]  [phone]                Patient Follow Up Instructions: Activity: Activity as tolerated  Diet:  Cardiac Diet, Diabetic Diet, and Renal Diet    Condition at Discharge:  Stable  __________________________________________________________________    Disposition  Home or Self Care  ____________________________________________________________________    Code Status:  Full Code  ___________________________________________________________________    Discharge Exam:  Patient seen and examined by me on discharge day. Pertinent Findings:  Gen:    Not in distress  Chest: Clear lungs  CVS:   Regular rhythm.   No edema  Abd:  Soft, not distended, not tender  Neuro:  Alert    CONSULTATIONS: Cardiology, Nephrology, and General Surgery    Significant Diagnostic Studies:   Recent Results (from the past 24 hour(s))   GLUCOSE, POC    Collection Time: 07/30/22 12:25 PM   Result Value Ref Range    Glucose (POC) 136 (H) 65 - 117 mg/dL    Performed by Tadeo Gonsalez, POC    Collection Time: 07/30/22  5:14 PM   Result Value Ref Range    Glucose (POC) 161 (H) 65 - 117 mg/dL    Performed by Chitra Prasad POC    Collection Time: 07/30/22  8:58 PM   Result Value Ref Range    Glucose (POC) 143 (H) 65 - 117 mg/dL    Performed by Tacos Jimenez    RENAL FUNCTION PANEL    Collection Time: 07/31/22  7:29 AM   Result Value Ref Range    Sodium 134 (L) 136 - 145 mmol/L    Potassium 3.9 3.5 - 5.1 mmol/L    Chloride 98 97 - 108 mmol/L    CO2 28 21 - 32 mmol/L    Anion gap 8 5 - 15 mmol/L    Glucose 169 (H) 65 - 100 mg/dL    BUN 32 (H) 6 - 20 mg/dL    Creatinine 7.76 (H) 0.55 - 1.02 mg/dL    BUN/Creatinine ratio 4 (L) 12 - 20      GFR est AA 6 (L) >60 ml/min/1.73m2    GFR est non-AA 5 (L) >60 ml/min/1.73m2    Calcium 9.2 8.5 - 10.1 mg/dL    Phosphorus 3.8 2.6 - 4.7 mg/dL    Albumin 3.1 (L) 3.5 - 5.0 g/dL   GLUCOSE, POC    Collection Time: 07/31/22  7:35 AM   Result Value Ref Range    Glucose (POC) 174 (H) 65 - 117 mg/dL    Performed by Guillermina HAN HEPATOBILIARY DUCT SCAN   Final Result   Absent gallbladder visualization through 90 minutes of imaging,   suggestive of acute cholecystitis. No evidence of common bile duct obstruction. CT ABD PELV WO CONT   Final Result   1. Massively dilated gallbladder extends below the sacral promontory. Hepatobiliary scintigraphy (\"HIDA scan\") is recommended to differentiate acute   cholecystitis from a hydropic gallbladder. 2. Cardiomegaly. Small bilateral pleural effusions. The findings were called to Dr. Praneeth Rosa on 7/28/2022 at 0703 hours by Dr. Nasim Garcia. [phone]      XR ABD (KUB)   Final Result   1. Cardiomegaly. Pulmonary vascular congestion without overt interstitial edema. 2. Normal bowel gas pattern. Lobulated, retracted abdominal wall. This should be   readily evident on physical examination. XR CHEST PORT   Final Result   Interstitial and parenchymal pulmonary edema with cardiomegaly.                      Signed:  Tip Bashir MD  7/31/2022  12:08 PM

## 2022-07-31 NOTE — PROGRESS NOTES
Problem: Pressure Injury - Risk of  Goal: *Prevention of pressure injury  Description: Document Storm Scale and appropriate interventions in the flowsheet. Outcome: Progressing Towards Goal  Note: Pressure Injury Interventions:       Moisture Interventions: Absorbent underpads    Activity Interventions: Increase time out of bed    Mobility Interventions: PT/OT evaluation    Nutrition Interventions: Document food/fluid/supplement intake    Friction and Shear Interventions: Minimize layers                Problem: Patient Education: Go to Patient Education Activity  Goal: Patient/Family Education  Outcome: Progressing Towards Goal     Problem: Falls - Risk of  Goal: *Absence of Falls  Description: Document Alma Delia Fall Risk and appropriate interventions in the flowsheet. Outcome: Progressing Towards Goal  Note: Fall Risk Interventions:  Mobility Interventions: PT Consult for mobility concerns, PT Consult for assist device competence         Medication Interventions: Patient to call before getting OOB, Bed/chair exit alarm    Elimination Interventions: Call light in reach, Bed/chair exit alarm    History of Falls Interventions: Bed/chair exit alarm         Problem: Patient Education: Go to Patient Education Activity  Goal: Patient/Family Education  Outcome: Progressing Towards Goal     Problem: Patient Education: Go to Patient Education Activity  Goal: Patient/Family Education  Outcome: Progressing Towards Goal     Problem: Falls - Risk of  Goal: *Absence of Falls  Description: Document Tony Luke Fall Risk and appropriate interventions in the flowsheet.   Outcome: Progressing Towards Goal  Note: Fall Risk Interventions:  Mobility Interventions: PT Consult for mobility concerns, PT Consult for assist device competence         Medication Interventions: Patient to call before getting OOB, Bed/chair exit alarm    Elimination Interventions: Call light in reach, Bed/chair exit alarm    History of Falls Interventions: Bed/chair exit alarm         Problem: Patient Education: Go to Patient Education Activity  Goal: Patient/Family Education  Outcome: Progressing Towards Goal

## 2022-08-03 LAB
GLUCOSE BLD STRIP.AUTO-MCNC: 75 MG/DL (ref 65–117)
PERFORMED BY, TECHID: NORMAL

## 2022-08-27 ENCOUNTER — APPOINTMENT (OUTPATIENT)
Dept: GENERAL RADIOLOGY | Age: 67
End: 2022-08-27
Attending: EMERGENCY MEDICINE
Payer: MEDICARE

## 2022-08-27 ENCOUNTER — HOSPITAL ENCOUNTER (EMERGENCY)
Age: 67
Discharge: HOME | End: 2022-08-27
Attending: EMERGENCY MEDICINE
Payer: MEDICARE

## 2022-08-27 VITALS
TEMPERATURE: 98.6 F | HEART RATE: 82 BPM | SYSTOLIC BLOOD PRESSURE: 163 MMHG | WEIGHT: 166 LBS | RESPIRATION RATE: 18 BRPM | OXYGEN SATURATION: 98 % | DIASTOLIC BLOOD PRESSURE: 74 MMHG | BODY MASS INDEX: 26.68 KG/M2 | HEIGHT: 66 IN

## 2022-08-27 DIAGNOSIS — Z99.2 DIALYSIS PATIENT (HCC): Primary | ICD-10-CM

## 2022-08-27 LAB
ALBUMIN SERPL-MCNC: 3 G/DL (ref 3.5–5)
ALBUMIN/GLOB SERPL: 0.9 {RATIO} (ref 1.1–2.2)
ALP SERPL-CCNC: 91 U/L (ref 45–117)
ALT SERPL-CCNC: 15 U/L (ref 12–78)
ANION GAP SERPL CALC-SCNC: 7 MMOL/L (ref 5–15)
AST SERPL W P-5'-P-CCNC: 11 U/L (ref 15–37)
BASOPHILS # BLD: 0 K/UL (ref 0–0.1)
BASOPHILS NFR BLD: 0 % (ref 0–1)
BILIRUB SERPL-MCNC: 0.5 MG/DL (ref 0.2–1)
BUN SERPL-MCNC: 48 MG/DL (ref 6–20)
BUN/CREAT SERPL: 5 (ref 12–20)
CA-I BLD-MCNC: 9.4 MG/DL (ref 8.5–10.1)
CHLORIDE SERPL-SCNC: 101 MMOL/L (ref 97–108)
CO2 SERPL-SCNC: 30 MMOL/L (ref 21–32)
CREAT SERPL-MCNC: 10.4 MG/DL (ref 0.55–1.02)
DIFFERENTIAL METHOD BLD: ABNORMAL
EOSINOPHIL # BLD: 0.4 K/UL (ref 0–0.4)
EOSINOPHIL NFR BLD: 10 % (ref 0–7)
ERYTHROCYTE [DISTWIDTH] IN BLOOD BY AUTOMATED COUNT: 16.3 % (ref 11.5–14.5)
GLOBULIN SER CALC-MCNC: 3.5 G/DL (ref 2–4)
GLUCOSE SERPL-MCNC: 107 MG/DL (ref 65–100)
HCT VFR BLD AUTO: 30 % (ref 35–47)
HGB BLD-MCNC: 9.5 G/DL (ref 11.5–16)
IMM GRANULOCYTES # BLD AUTO: 0 K/UL (ref 0–0.04)
IMM GRANULOCYTES NFR BLD AUTO: 0 % (ref 0–0.5)
LYMPHOCYTES # BLD: 0.7 K/UL (ref 0.8–3.5)
LYMPHOCYTES NFR BLD: 15 % (ref 12–49)
MCH RBC QN AUTO: 33.8 PG (ref 26–34)
MCHC RBC AUTO-ENTMCNC: 31.7 G/DL (ref 30–36.5)
MCV RBC AUTO: 106.8 FL (ref 80–99)
MONOCYTES # BLD: 0.5 K/UL (ref 0–1)
MONOCYTES NFR BLD: 10 % (ref 5–13)
NEUTS SEG # BLD: 2.9 K/UL (ref 1.8–8)
NEUTS SEG NFR BLD: 65 % (ref 32–75)
NRBC # BLD: 0 K/UL (ref 0–0.01)
NRBC BLD-RTO: 0 PER 100 WBC
PLATELET # BLD AUTO: 132 K/UL (ref 150–400)
PMV BLD AUTO: 11.5 FL (ref 8.9–12.9)
POTASSIUM SERPL-SCNC: 4.5 MMOL/L (ref 3.5–5.1)
PROT SERPL-MCNC: 6.5 G/DL (ref 6.4–8.2)
RBC # BLD AUTO: 2.81 M/UL (ref 3.8–5.2)
SODIUM SERPL-SCNC: 138 MMOL/L (ref 136–145)
WBC # BLD AUTO: 4.5 K/UL (ref 3.6–11)

## 2022-08-27 PROCEDURE — 80053 COMPREHEN METABOLIC PANEL: CPT

## 2022-08-27 PROCEDURE — 36415 COLL VENOUS BLD VENIPUNCTURE: CPT

## 2022-08-27 PROCEDURE — G0257 UNSCHED DIALYSIS ESRD PT HOS: HCPCS

## 2022-08-27 PROCEDURE — 85025 COMPLETE CBC W/AUTO DIFF WBC: CPT

## 2022-08-27 PROCEDURE — 75810000275 HC EMERGENCY DEPT VISIT NO LEVEL OF CARE

## 2022-08-27 PROCEDURE — 71045 X-RAY EXAM CHEST 1 VIEW: CPT

## 2022-08-27 NOTE — DISCHARGE INSTRUCTIONS
Thank you! Thank you for allowing me to care for you in the emergency department. It is my goal to provide you with excellent care. If you have not received excellent quality care, please ask to speak to the nurse manager. Please fill out the survey that will come to you by mail or email since we listen to your feedback! Below you will find a list of your tests from today's visit. Should you have any questions, please do not hesitate to call the emergency department. Labs  Recent Results (from the past 12 hour(s))   CBC WITH AUTOMATED DIFF    Collection Time: 08/27/22 11:58 AM   Result Value Ref Range    WBC 4.5 3.6 - 11.0 K/uL    RBC 2.81 (L) 3.80 - 5.20 M/uL    HGB 9.5 (L) 11.5 - 16.0 g/dL    HCT 30.0 (L) 35.0 - 47.0 %    .8 (H) 80.0 - 99.0 FL    MCH 33.8 26.0 - 34.0 PG    MCHC 31.7 30.0 - 36.5 g/dL    RDW 16.3 (H) 11.5 - 14.5 %    PLATELET 272 (L) 379 - 400 K/uL    MPV 11.5 8.9 - 12.9 FL    NRBC 0.0 0.0  WBC    ABSOLUTE NRBC 0.00 0.00 - 0.01 K/uL    NEUTROPHILS 65 32 - 75 %    LYMPHOCYTES 15 12 - 49 %    MONOCYTES 10 5 - 13 %    EOSINOPHILS 10 (H) 0 - 7 %    BASOPHILS 0 0 - 1 %    IMMATURE GRANULOCYTES 0 0 - 0.5 %    ABS. NEUTROPHILS 2.9 1.8 - 8.0 K/UL    ABS. LYMPHOCYTES 0.7 (L) 0.8 - 3.5 K/UL    ABS. MONOCYTES 0.5 0.0 - 1.0 K/UL    ABS. EOSINOPHILS 0.4 0.0 - 0.4 K/UL    ABS. BASOPHILS 0.0 0.0 - 0.1 K/UL    ABS. IMM.  GRANS. 0.0 0.00 - 0.04 K/UL    DF AUTOMATED     METABOLIC PANEL, COMPREHENSIVE    Collection Time: 08/27/22 11:58 AM   Result Value Ref Range    Sodium 138 136 - 145 mmol/L    Potassium 4.5 3.5 - 5.1 mmol/L    Chloride 101 97 - 108 mmol/L    CO2 30 21 - 32 mmol/L    Anion gap 7 5 - 15 mmol/L    Glucose 107 (H) 65 - 100 mg/dL    BUN 48 (H) 6 - 20 mg/dL    Creatinine 10.40 (H) 0.55 - 1.02 mg/dL    BUN/Creatinine ratio 5 (L) 12 - 20      GFR est AA 4 (L) >60 ml/min/1.73m2    GFR est non-AA 4 (L) >60 ml/min/1.73m2    Calcium 9.4 8.5 - 10.1 mg/dL    Bilirubin, total 0.5 0.2 - 1.0 mg/dL    AST (SGOT) 11 (L) 15 - 37 U/L    ALT (SGPT) 15 12 - 78 U/L    Alk. phosphatase 91 45 - 117 U/L    Protein, total 6.5 6.4 - 8.2 g/dL    Albumin 3.0 (L) 3.5 - 5.0 g/dL    Globulin 3.5 2.0 - 4.0 g/dL    A-G Ratio 0.9 (L) 1.1 - 2.2         Radiologic Studies  XR CHEST PORT   Final Result   Mild pulmonary edema which is improved from the prior study. Airspace opacity and small left pleural effusion and the left lung base. Some of   this may represent volume loss        CT Results  (Last 48 hours)      None          CXR Results  (Last 48 hours)                 08/27/22 1200  XR CHEST PORT Final result    Impression:  Mild pulmonary edema which is improved from the prior study. Airspace opacity and small left pleural effusion and the left lung base. Some of   this may represent volume loss       Narrative:  EXAM: XR CHEST PORT       INDICATION: missed hd       COMPARISON: 7/27/2022       FINDINGS: A portable AP radiograph of the chest was obtained at 1153 hours. Left   subclavian pacemaker. Median sternotomy changes. SVC and right axillary stents. Airspace opacity in the left lung base with possible small left pleural   effusion. . Mild pulmonary edema which is improved from the prior study. .  The   bones and soft tissues are grossly within normal limits.                  ------------------------------------------------------------------------------------------------------------  The exam and treatment you received in the Emergency Department were for an urgent problem and are not intended as complete care. It is important that you follow-up with a doctor, nurse practitioner, or physician assistant to:  (1) confirm your diagnosis,  (2) re-evaluation of changes in your illness and treatment, and  (3) for ongoing care. Please take your discharge instructions with you when you go to your follow-up appointment. If you have any problem arranging a follow-up appointment, contact the Emergency Department.   If your symptoms become worse or you do not improve as expected and you are unable to reach your health care provider, please return to the Emergency Department. We are available 24 hours a day. If a prescription has been provided, please have it filled as soon as possible to prevent a delay in treatment. If you have any questions or reservations about taking the medication due to side effects or interactions with other medications, please call your primary care provider or contact the ER.

## 2022-08-27 NOTE — ED NOTES
This RN spoke with pt's daughter Mohsen Meadows, she will be here in approx 30 minutes to pick her up.

## 2022-08-27 NOTE — DIALYSIS
Pt tolerated treatment well. No complaints voiced during or post treatment. Pt discharged to ER room 22. V/S stable post treatment.  Removed 3.0kg this treatment

## 2022-08-28 NOTE — ED PROVIDER NOTES
EMERGENCY DEPARTMENT HISTORY AND PHYSICAL EXAM      Date: 8/27/2022  Patient Name: Qing Murdock    History of Presenting Illness     Chief Complaint   Patient presents with    Other       History Provided By: Patient    HPI: Qing Murdock, 75 y.o. female with past medical history significant for diabetes, CKD on HD Monday, Wednesday, Friday, hypertension presenting to the emergency department for dialysis. Patient reports that she missed her dialysis session on Friday due to inability to obtain a ride. States that her family member spoke with her nephrology office who recommended patient come to the emergency department for dialysis. Patient has no complaints. There are no other complaints, changes, or physical findings at this time. PCP: Aki Baptiste MD    No current facility-administered medications on file prior to encounter. Current Outpatient Medications on File Prior to Encounter   Medication Sig Dispense Refill    isosorbide dinitrate (ISORDIL) 10 mg tablet Take 1 Tablet by mouth three (3) times daily. 90 Tablet 0    diclofenac (VOLTAREN) 1 % gel apply to foot and hands as needed for arthritis pain      cholecalciferol (VITAMIN D3) (1000 Units /25 mcg) tablet Take 2,000 Units by mouth daily. furosemide (LASIX) 20 mg tablet Take 20 mg by mouth daily. rosuvastatin (CRESTOR) 20 mg tablet TAKE 1 TABLET NIGHTLY (STOP 40 MG DOSE) 90 Tablet 3    amLODIPine (NORVASC) 10 mg tablet Take 10 mg by mouth daily. clopidogreL (PLAVIX) 75 mg tab Take 75 mg by mouth daily. sacubitriL-valsartan (Entresto) 49-51 mg tab tablet Take 1 Tab by mouth two (2) times a day. insulin glargine (Lantus Solostar U-100 Insulin) 100 unit/mL (3 mL) inpn Inject 8 units daily. 15 mL 3    hydrALAZINE (APRESOLINE) 25 mg tablet Take 25 mg by mouth three (3) times daily. 1    sevelamer carbonate (RENVELA) 800 mg tab tab Take  by mouth three (3) times daily.       pantoprazole (PROTONIX) 40 mg tablet acetaminophen (TYLENOL) 500 mg tablet Take  by mouth every six (6) hours as needed for Pain. docusate sodium (COLACE) 100 mg capsule Take 100 mg by mouth as needed for Constipation. carvediloL (COREG) 25 mg tablet Take 25 mg by mouth two (2) times daily (with meals). ferrous sulfate 325 mg (65 mg iron) tablet Take 325 mg by mouth as needed. Past History     Past Medical History:  Past Medical History:   Diagnosis Date    Blind     CKD (chronic kidney disease)     HTN (hypertension)     Retinopathy     Type II or unspecified type diabetes mellitus with renal manifestations, uncontrolled(250.42) ([facility])        Past Surgical History:  Past Surgical History:   Procedure Laterality Date    HX CORONARY ARTERY BYPASS GRAFT      HX GYN      hysterectomy        Family History:  Family History   Problem Relation Age of Onset    Diabetes Maternal Aunt     Diabetes Maternal Grandmother        Social History:  Social History     Tobacco Use    Smoking status: Never    Smokeless tobacco: Never   Substance Use Topics    Alcohol use: No    Drug use: Never       Allergies:  No Known Allergies    Review of Systems   Review of Systems   Constitutional:  Negative for chills and fever. HENT:  Negative for congestion and sore throat. Eyes:  Positive for visual disturbance (blind). Negative for pain. Respiratory:  Negative for cough and shortness of breath. Cardiovascular:  Negative for chest pain and palpitations. Gastrointestinal:  Negative for constipation, diarrhea, nausea and vomiting. Genitourinary:  Negative for dysuria and frequency. Musculoskeletal:  Negative for arthralgias and myalgias. Skin:  Negative for color change and rash. Neurological:  Negative for dizziness, weakness, light-headedness and headaches. Physical Exam   Physical Exam  Constitutional:       Appearance: Normal appearance. HENT:      Head: Normocephalic and atraumatic.       Right Ear: External ear normal. Left Ear: External ear normal.      Nose: Nose normal.      Mouth/Throat:      Mouth: Mucous membranes are moist.   Eyes:      Conjunctiva/sclera: Conjunctivae normal.   Cardiovascular:      Rate and Rhythm: Normal rate and regular rhythm. Pulses: Normal pulses. Heart sounds: Normal heart sounds. Pulmonary:      Effort: Pulmonary effort is normal.      Breath sounds: Normal breath sounds. Abdominal:      General: Abdomen is flat. There is no distension. Palpations: Abdomen is soft. Tenderness: There is no abdominal tenderness. Musculoskeletal:         General: No swelling or tenderness. Normal range of motion. Cervical back: Normal range of motion. Skin:     General: Skin is warm and dry. Capillary Refill: Capillary refill takes less than 2 seconds. Neurological:      General: No focal deficit present. Mental Status: She is alert and oriented to person, place, and time. Mental status is at baseline. Psychiatric:         Mood and Affect: Mood normal.         Behavior: Behavior normal.       Lab and Diagnostic Study Results   Labs -     Recent Results (from the past 12 hour(s))   CBC WITH AUTOMATED DIFF    Collection Time: 08/27/22 11:58 AM   Result Value Ref Range    WBC 4.5 3.6 - 11.0 K/uL    RBC 2.81 (L) 3.80 - 5.20 M/uL    HGB 9.5 (L) 11.5 - 16.0 g/dL    HCT 30.0 (L) 35.0 - 47.0 %    .8 (H) 80.0 - 99.0 FL    MCH 33.8 26.0 - 34.0 PG    MCHC 31.7 30.0 - 36.5 g/dL    RDW 16.3 (H) 11.5 - 14.5 %    PLATELET 908 (L) 310 - 400 K/uL    MPV 11.5 8.9 - 12.9 FL    NRBC 0.0 0.0  WBC    ABSOLUTE NRBC 0.00 0.00 - 0.01 K/uL    NEUTROPHILS 65 32 - 75 %    LYMPHOCYTES 15 12 - 49 %    MONOCYTES 10 5 - 13 %    EOSINOPHILS 10 (H) 0 - 7 %    BASOPHILS 0 0 - 1 %    IMMATURE GRANULOCYTES 0 0 - 0.5 %    ABS. NEUTROPHILS 2.9 1.8 - 8.0 K/UL    ABS. LYMPHOCYTES 0.7 (L) 0.8 - 3.5 K/UL    ABS. MONOCYTES 0.5 0.0 - 1.0 K/UL    ABS. EOSINOPHILS 0.4 0.0 - 0.4 K/UL    ABS.  BASOPHILS 0.0 0.0 - 0.1 K/UL    ABS. IMM. GRANS. 0.0 0.00 - 0.04 K/UL    DF AUTOMATED     METABOLIC PANEL, COMPREHENSIVE    Collection Time: 08/27/22 11:58 AM   Result Value Ref Range    Sodium 138 136 - 145 mmol/L    Potassium 4.5 3.5 - 5.1 mmol/L    Chloride 101 97 - 108 mmol/L    CO2 30 21 - 32 mmol/L    Anion gap 7 5 - 15 mmol/L    Glucose 107 (H) 65 - 100 mg/dL    BUN 48 (H) 6 - 20 mg/dL    Creatinine 10.40 (H) 0.55 - 1.02 mg/dL    BUN/Creatinine ratio 5 (L) 12 - 20      GFR est AA 4 (L) >60 ml/min/1.73m2    GFR est non-AA 4 (L) >60 ml/min/1.73m2    Calcium 9.4 8.5 - 10.1 mg/dL    Bilirubin, total 0.5 0.2 - 1.0 mg/dL    AST (SGOT) 11 (L) 15 - 37 U/L    ALT (SGPT) 15 12 - 78 U/L    Alk. phosphatase 91 45 - 117 U/L    Protein, total 6.5 6.4 - 8.2 g/dL    Albumin 3.0 (L) 3.5 - 5.0 g/dL    Globulin 3.5 2.0 - 4.0 g/dL    A-G Ratio 0.9 (L) 1.1 - 2.2         Radiologic Studies -   @lastxrresult@  CT Results  (Last 48 hours)      None          CXR Results  (Last 48 hours)                 08/27/22 1200  XR CHEST PORT Final result    Impression:  Mild pulmonary edema which is improved from the prior study. Airspace opacity and small left pleural effusion and the left lung base. Some of   this may represent volume loss       Narrative:  EXAM: XR CHEST PORT       INDICATION: missed hd       COMPARISON: 7/27/2022       FINDINGS: A portable AP radiograph of the chest was obtained at 1153 hours. Left   subclavian pacemaker. Median sternotomy changes. SVC and right axillary stents. Airspace opacity in the left lung base with possible small left pleural   effusion. . Mild pulmonary edema which is improved from the prior study. .  The   bones and soft tissues are grossly within normal limits. Medical Decision Making and ED Course   Differential Diagnosis & Medical Decision Making Provider Note:   69-year-old female presenting to the emergency department for dialysis.   States that she missed her dialysis session on Friday due to inability to obtain a ride. Patient has no physical complaints upon arrival to the emergency department. Discussed with patient's nephrologist who will dialyze patient regardless of her labs. Patient does not appear to be in fluid overload. She is not significantly hypertensive nor hypoxic. Laboratory evaluation without hyperkalemia or gap acidosis. Patient tolerated dialysis without difficulties. Stable for discharge home at this time. - I am the first provider for this patient. I reviewed the vital signs, available nursing notes, past medical history, past surgical history, family history and social history. The patients presenting problems have been discussed, and they are in agreement with the care plan formulated and outlined with them. I have encouraged them to ask questions as they arise throughout their visit. Vital Signs-Reviewed the patient's vital signs. Patient Vitals for the past 12 hrs:   Temp Pulse Resp BP SpO2   08/27/22 1448 98.6 °F (37 °C) 82 -- (!) 163/74 --   08/27/22 1445 -- 91 -- (!) 166/79 --   08/27/22 1415 -- 74 -- (!) 172/80 --   08/27/22 1345 -- 80 -- (!) 163/82 --   08/27/22 1315 -- 82 -- (!) 167/83 --   08/27/22 1255 -- 67 -- (!) 155/77 --   08/27/22 1225 -- 77 18 (!) 142/58 --   08/27/22 1214 -- 80 18 (!) 145/65 98 %   08/27/22 1124 98.2 °F (36.8 °C) 86 18 (!) 151/77 98 %       ED Course:          Procedures   Performed by: Pili Villarreal, DO  Procedures      Disposition   Disposition: Condition stable  DC- Adult Discharges: All of the diagnostic tests were reviewed and questions answered. Diagnosis, care plan and treatment options were discussed. The patient understands the instructions and will follow up as directed. The patients results have been reviewed with them. They have been counseled regarding their diagnosis.   The patient verbally convey understanding and agreement of the signs, symptoms, diagnosis, treatment and prognosis and additionally agrees to follow up as recommended with their PCP in 24 - 48 hours. They also agree with the care-plan and convey that all of their questions have been answered. I have also put together some discharge instructions for them that include: 1) educational information regarding their diagnosis, 2) how to care for their diagnosis at home, as well a 3) list of reasons why they would want to return to the ED prior to their follow-up appointment, should their condition change. DC-The patient was given verbal follow-up and dialysis instructions    DISCHARGE PLAN:  1. Cannot display discharge medications since this patient is not currently admitted. 2.   Follow-up Information       Follow up With Specialties Details Why [address] [facility] EMERGENCY DEPT Emergency Medicine  As needed, If symptoms worsen [address] [facility], [place]    Kameron Wolfe MD Family Medicine Schedule an appointment as soon as possible for a visit   [address]  [facility] [address] [facility]  [phone]            3. Return to ED if worse   4. Discharge Medication List as of 8/27/2022  3:30 PM         Remove if admitted/transferred    Diagnosis/Clinical Impression     Clinical Impression:   1. Dialysis patient [facility])        Attestations: Kentrell Otero, DO, am the primary clinician of record. Please note that this dictation was completed with [employer], the computer voice recognition software. Quite often unanticipated grammatical, syntax, homophones, and other interpretive errors are inadvertently transcribed by the computer software. Please disregard these errors. Please excuse any errors that have escaped final proofreading. Thank you.

## 2023-03-06 ENCOUNTER — APPOINTMENT (OUTPATIENT)
Dept: ULTRASOUND IMAGING | Age: 68
DRG: 444 | End: 2023-03-06
Attending: EMERGENCY MEDICINE
Payer: MEDICARE

## 2023-03-06 ENCOUNTER — HOSPITAL ENCOUNTER (INPATIENT)
Age: 68
LOS: 3 days | Discharge: HOME | DRG: 444 | End: 2023-03-09
Attending: EMERGENCY MEDICINE | Admitting: INTERNAL MEDICINE
Payer: MEDICARE

## 2023-03-06 ENCOUNTER — APPOINTMENT (OUTPATIENT)
Dept: GENERAL RADIOLOGY | Age: 68
DRG: 444 | End: 2023-03-06
Attending: INTERNAL MEDICINE
Payer: MEDICARE

## 2023-03-06 ENCOUNTER — APPOINTMENT (OUTPATIENT)
Dept: CT IMAGING | Age: 68
DRG: 444 | End: 2023-03-06
Attending: EMERGENCY MEDICINE
Payer: MEDICARE

## 2023-03-06 DIAGNOSIS — K81.9 CHOLECYSTITIS: Primary | ICD-10-CM

## 2023-03-06 PROBLEM — K81.0 ACUTE CHOLECYSTITIS: Status: ACTIVE | Noted: 2023-03-06

## 2023-03-06 LAB
ALBUMIN SERPL-MCNC: 3.6 G/DL (ref 3.5–5)
ALBUMIN/GLOB SERPL: 0.9 (ref 1.1–2.2)
ALP SERPL-CCNC: 144 U/L (ref 45–117)
ALT SERPL-CCNC: 24 U/L (ref 12–78)
ANION GAP SERPL CALC-SCNC: 12 MMOL/L (ref 5–15)
AST SERPL W P-5'-P-CCNC: 22 U/L (ref 15–37)
ATRIAL RATE: 93 BPM
BASOPHILS # BLD: 0.1 K/UL (ref 0–0.1)
BASOPHILS NFR BLD: 1 % (ref 0–1)
BILIRUB SERPL-MCNC: 0.9 MG/DL (ref 0.2–1)
BUN SERPL-MCNC: 84 MG/DL (ref 6–20)
BUN/CREAT SERPL: 8 (ref 12–20)
CA-I BLD-MCNC: 10.1 MG/DL (ref 8.5–10.1)
CALCULATED P AXIS, ECG09: 73 DEGREES
CALCULATED R AXIS, ECG10: 106 DEGREES
CALCULATED T AXIS, ECG11: 65 DEGREES
CHLORIDE SERPL-SCNC: 101 MMOL/L (ref 97–108)
CO2 SERPL-SCNC: 19 MMOL/L (ref 21–32)
CREAT SERPL-MCNC: 10.8 MG/DL (ref 0.55–1.02)
DIAGNOSIS, 93000: NORMAL
DIFFERENTIAL METHOD BLD: ABNORMAL
EOSINOPHIL # BLD: 0.8 K/UL (ref 0–0.4)
EOSINOPHIL NFR BLD: 9 % (ref 0–7)
ERYTHROCYTE [DISTWIDTH] IN BLOOD BY AUTOMATED COUNT: 16.8 % (ref 11.5–14.5)
GLOBULIN SER CALC-MCNC: 4 G/DL (ref 2–4)
GLUCOSE SERPL-MCNC: 175 MG/DL (ref 65–100)
HCT VFR BLD AUTO: 36.1 % (ref 35–47)
HGB BLD-MCNC: 11.7 G/DL (ref 11.5–16)
IMM GRANULOCYTES # BLD AUTO: 0 K/UL (ref 0–0.04)
IMM GRANULOCYTES NFR BLD AUTO: 0 % (ref 0–0.5)
LIPASE SERPL-CCNC: 92 U/L (ref 73–393)
LYMPHOCYTES # BLD: 0.8 K/UL (ref 0.8–3.5)
LYMPHOCYTES NFR BLD: 9 % (ref 12–49)
MCH RBC QN AUTO: 31.8 PG (ref 26–34)
MCHC RBC AUTO-ENTMCNC: 32.4 G/DL (ref 30–36.5)
MCV RBC AUTO: 98.1 FL (ref 80–99)
MONOCYTES # BLD: 0.5 K/UL (ref 0–1)
MONOCYTES NFR BLD: 6 % (ref 5–13)
NEUTS SEG # BLD: 6.9 K/UL (ref 1.8–8)
NEUTS SEG NFR BLD: 75 % (ref 32–75)
NRBC # BLD: 0 K/UL (ref 0–0.01)
NRBC BLD-RTO: 0 PER 100 WBC
P-R INTERVAL, ECG05: 170 MS
PLATELET # BLD AUTO: 142 K/UL (ref 150–400)
POTASSIUM SERPL-SCNC: 6.3 MMOL/L (ref 3.5–5.1)
PROT SERPL-MCNC: 7.6 G/DL (ref 6.4–8.2)
Q-T INTERVAL, ECG07: 378 MS
QRS DURATION, ECG06: 88 MS
QTC CALCULATION (BEZET), ECG08: 469 MS
RBC # BLD AUTO: 3.68 M/UL (ref 3.8–5.2)
SODIUM SERPL-SCNC: 132 MMOL/L (ref 136–145)
VENTRICULAR RATE, ECG03: 93 BPM
WBC # BLD AUTO: 9.1 K/UL (ref 3.6–11)

## 2023-03-06 PROCEDURE — 74011250637 HC RX REV CODE- 250/637: Performed by: INTERNAL MEDICINE

## 2023-03-06 PROCEDURE — 80053 COMPREHEN METABOLIC PANEL: CPT

## 2023-03-06 PROCEDURE — 74176 CT ABD & PELVIS W/O CONTRAST: CPT

## 2023-03-06 PROCEDURE — 65270000029 HC RM PRIVATE

## 2023-03-06 PROCEDURE — 96374 THER/PROPH/DIAG INJ IV PUSH: CPT

## 2023-03-06 PROCEDURE — 99285 EMERGENCY DEPT VISIT HI MDM: CPT

## 2023-03-06 PROCEDURE — 93005 ELECTROCARDIOGRAM TRACING: CPT

## 2023-03-06 PROCEDURE — 36415 COLL VENOUS BLD VENIPUNCTURE: CPT

## 2023-03-06 PROCEDURE — 71045 X-RAY EXAM CHEST 1 VIEW: CPT

## 2023-03-06 PROCEDURE — 85025 COMPLETE CBC W/AUTO DIFF WBC: CPT

## 2023-03-06 PROCEDURE — 76705 ECHO EXAM OF ABDOMEN: CPT

## 2023-03-06 PROCEDURE — 74011000250 HC RX REV CODE- 250: Performed by: INTERNAL MEDICINE

## 2023-03-06 PROCEDURE — 83690 ASSAY OF LIPASE: CPT

## 2023-03-06 PROCEDURE — 74011250636 HC RX REV CODE- 250/636: Performed by: INTERNAL MEDICINE

## 2023-03-06 PROCEDURE — G0378 HOSPITAL OBSERVATION PER HR: HCPCS

## 2023-03-06 PROCEDURE — 87636 SARSCOV2 & INF A&B AMP PRB: CPT

## 2023-03-06 PROCEDURE — 96375 TX/PRO/DX INJ NEW DRUG ADDON: CPT

## 2023-03-06 PROCEDURE — 74011000258 HC RX REV CODE- 258: Performed by: INTERNAL MEDICINE

## 2023-03-06 RX ORDER — ISOSORBIDE DINITRATE 10 MG/1
10 TABLET ORAL 3 TIMES DAILY
Status: DISCONTINUED | OUTPATIENT
Start: 2023-03-06 | End: 2023-03-07

## 2023-03-06 RX ORDER — ONDANSETRON 4 MG/1
4 TABLET, ORALLY DISINTEGRATING ORAL
Status: DISCONTINUED | OUTPATIENT
Start: 2023-03-06 | End: 2023-03-09 | Stop reason: HOSPADM

## 2023-03-06 RX ORDER — SODIUM CHLORIDE 0.9 % (FLUSH) 0.9 %
5-40 SYRINGE (ML) INJECTION AS NEEDED
Status: DISCONTINUED | OUTPATIENT
Start: 2023-03-06 | End: 2023-03-09 | Stop reason: HOSPADM

## 2023-03-06 RX ORDER — ONDANSETRON 2 MG/ML
4 INJECTION INTRAMUSCULAR; INTRAVENOUS
Status: DISCONTINUED | OUTPATIENT
Start: 2023-03-06 | End: 2023-03-09 | Stop reason: HOSPADM

## 2023-03-06 RX ORDER — MELATONIN
2000 DAILY
Status: DISCONTINUED | OUTPATIENT
Start: 2023-03-07 | End: 2023-03-09 | Stop reason: HOSPADM

## 2023-03-06 RX ORDER — DICLOFENAC SODIUM 10 MG/G
2 GEL TOPICAL
Status: DISCONTINUED | OUTPATIENT
Start: 2023-03-06 | End: 2023-03-09 | Stop reason: HOSPADM

## 2023-03-06 RX ORDER — POLYETHYLENE GLYCOL 3350 17 G/17G
17 POWDER, FOR SOLUTION ORAL DAILY PRN
Status: DISCONTINUED | OUTPATIENT
Start: 2023-03-06 | End: 2023-03-09 | Stop reason: HOSPADM

## 2023-03-06 RX ORDER — HYDRALAZINE HYDROCHLORIDE 25 MG/1
25 TABLET, FILM COATED ORAL 3 TIMES DAILY
Status: DISCONTINUED | OUTPATIENT
Start: 2023-03-06 | End: 2023-03-07

## 2023-03-06 RX ORDER — FUROSEMIDE 40 MG/1
20 TABLET ORAL DAILY
Status: DISCONTINUED | OUTPATIENT
Start: 2023-03-07 | End: 2023-03-09 | Stop reason: HOSPADM

## 2023-03-06 RX ORDER — ROSUVASTATIN CALCIUM 5 MG/1
20 TABLET, COATED ORAL
Status: DISCONTINUED | OUTPATIENT
Start: 2023-03-06 | End: 2023-03-07

## 2023-03-06 RX ORDER — SODIUM CHLORIDE 0.9 % (FLUSH) 0.9 %
5-40 SYRINGE (ML) INJECTION EVERY 8 HOURS
Status: DISCONTINUED | OUTPATIENT
Start: 2023-03-06 | End: 2023-03-09 | Stop reason: HOSPADM

## 2023-03-06 RX ORDER — ACETAMINOPHEN 325 MG/1
650 TABLET ORAL
Status: DISCONTINUED | OUTPATIENT
Start: 2023-03-06 | End: 2023-03-09 | Stop reason: HOSPADM

## 2023-03-06 RX ORDER — ACETAMINOPHEN 650 MG/1
650 SUPPOSITORY RECTAL
Status: DISCONTINUED | OUTPATIENT
Start: 2023-03-06 | End: 2023-03-09 | Stop reason: HOSPADM

## 2023-03-06 RX ORDER — AMLODIPINE BESYLATE 5 MG/1
10 TABLET ORAL DAILY
Status: DISCONTINUED | OUTPATIENT
Start: 2023-03-07 | End: 2023-03-07

## 2023-03-06 RX ORDER — PANTOPRAZOLE SODIUM 40 MG/1
40 TABLET, DELAYED RELEASE ORAL
Status: DISCONTINUED | OUTPATIENT
Start: 2023-03-07 | End: 2023-03-09 | Stop reason: HOSPADM

## 2023-03-06 RX ORDER — GUAIFENESIN 100 MG/5ML
100 SOLUTION ORAL
Status: COMPLETED | OUTPATIENT
Start: 2023-03-06 | End: 2023-03-06

## 2023-03-06 RX ORDER — GUAIFENESIN 100 MG/5ML
100 SOLUTION ORAL
Status: DISCONTINUED | OUTPATIENT
Start: 2023-03-06 | End: 2023-03-09 | Stop reason: HOSPADM

## 2023-03-06 RX ORDER — CARVEDILOL 12.5 MG/1
25 TABLET ORAL 2 TIMES DAILY WITH MEALS
Status: DISCONTINUED | OUTPATIENT
Start: 2023-03-07 | End: 2023-03-09 | Stop reason: HOSPADM

## 2023-03-06 RX ORDER — SODIUM BICARBONATE 1 MEQ/ML
50 SYRINGE (ML) INTRAVENOUS ONCE
Status: COMPLETED | OUTPATIENT
Start: 2023-03-06 | End: 2023-03-06

## 2023-03-06 RX ORDER — SEVELAMER CARBONATE 800 MG/1
800 TABLET, FILM COATED ORAL
Status: DISCONTINUED | OUTPATIENT
Start: 2023-03-07 | End: 2023-03-07

## 2023-03-06 RX ADMIN — SODIUM CHLORIDE, PRESERVATIVE FREE 10 ML: 5 INJECTION INTRAVENOUS at 22:06

## 2023-03-06 RX ADMIN — SODIUM BICARBONATE 50 MEQ: 84 INJECTION INTRAVENOUS at 22:05

## 2023-03-06 RX ADMIN — GUAIFENESIN 100 MG: 200 SOLUTION ORAL at 22:04

## 2023-03-06 RX ADMIN — PIPERACILLIN AND TAZOBACTAM 4.5 G: 4; .5 INJECTION, POWDER, FOR SOLUTION INTRAVENOUS at 22:04

## 2023-03-07 ENCOUNTER — APPOINTMENT (OUTPATIENT)
Dept: NUCLEAR MEDICINE | Age: 68
DRG: 444 | End: 2023-03-07
Attending: SURGERY
Payer: MEDICARE

## 2023-03-07 LAB
ALBUMIN SERPL-MCNC: 3.4 G/DL (ref 3.5–5)
ANION GAP SERPL CALC-SCNC: 13 MMOL/L (ref 5–15)
BASOPHILS # BLD: 0.1 K/UL (ref 0–0.1)
BASOPHILS NFR BLD: 1 % (ref 0–1)
BUN SERPL-MCNC: 95 MG/DL (ref 6–20)
BUN/CREAT SERPL: 8 (ref 12–20)
CA-I BLD-MCNC: 9.7 MG/DL (ref 8.5–10.1)
CHLORIDE SERPL-SCNC: 101 MMOL/L (ref 97–108)
CO2 SERPL-SCNC: 18 MMOL/L (ref 21–32)
CREAT SERPL-MCNC: 11.6 MG/DL (ref 0.55–1.02)
DIFFERENTIAL METHOD BLD: ABNORMAL
EOSINOPHIL # BLD: 0.2 K/UL (ref 0–0.4)
EOSINOPHIL NFR BLD: 2 % (ref 0–7)
ERYTHROCYTE [DISTWIDTH] IN BLOOD BY AUTOMATED COUNT: 16.9 % (ref 11.5–14.5)
FLUAV RNA SPEC QL NAA+PROBE: NOT DETECTED
FLUBV RNA SPEC QL NAA+PROBE: NOT DETECTED
GLUCOSE BLD STRIP.AUTO-MCNC: 105 MG/DL (ref 65–100)
GLUCOSE BLD STRIP.AUTO-MCNC: 176 MG/DL (ref 65–100)
GLUCOSE BLD STRIP.AUTO-MCNC: 92 MG/DL (ref 65–100)
GLUCOSE SERPL-MCNC: 188 MG/DL (ref 65–100)
HCT VFR BLD AUTO: 34.8 % (ref 35–47)
HGB BLD-MCNC: 11.9 G/DL (ref 11.5–16)
IMM GRANULOCYTES # BLD AUTO: 0 K/UL (ref 0–0.04)
IMM GRANULOCYTES NFR BLD AUTO: 1 % (ref 0–0.5)
LYMPHOCYTES # BLD: 1 K/UL (ref 0.8–3.5)
LYMPHOCYTES NFR BLD: 11 % (ref 12–49)
MCH RBC QN AUTO: 32.8 PG (ref 26–34)
MCHC RBC AUTO-ENTMCNC: 34.2 G/DL (ref 30–36.5)
MCV RBC AUTO: 95.9 FL (ref 80–99)
MONOCYTES # BLD: 0.5 K/UL (ref 0–1)
MONOCYTES NFR BLD: 5 % (ref 5–13)
MRSA DNA SPEC QL NAA+PROBE: NOT DETECTED
NEUTS SEG # BLD: 7.2 K/UL (ref 1.8–8)
NEUTS SEG NFR BLD: 80 % (ref 32–75)
NRBC # BLD: 0 K/UL (ref 0–0.01)
NRBC BLD-RTO: 0 PER 100 WBC
PERFORMED BY, TECHID: ABNORMAL
PERFORMED BY, TECHID: ABNORMAL
PERFORMED BY, TECHID: NORMAL
PHOSPHATE SERPL-MCNC: 6.3 MG/DL (ref 2.6–4.7)
PLATELET # BLD AUTO: 174 K/UL (ref 150–400)
PMV BLD AUTO: 12.9 FL (ref 8.9–12.9)
POTASSIUM SERPL-SCNC: 6.5 MMOL/L (ref 3.5–5.1)
RBC # BLD AUTO: 3.63 M/UL (ref 3.8–5.2)
SARS-COV-2 RNA RESP QL NAA+PROBE: NOT DETECTED
SODIUM SERPL-SCNC: 132 MMOL/L (ref 136–145)
WBC # BLD AUTO: 8.9 K/UL (ref 3.6–11)

## 2023-03-07 PROCEDURE — 74011250637 HC RX REV CODE- 250/637: Performed by: INTERNAL MEDICINE

## 2023-03-07 PROCEDURE — 87641 MR-STAPH DNA AMP PROBE: CPT

## 2023-03-07 PROCEDURE — 85025 COMPLETE CBC W/AUTO DIFF WBC: CPT

## 2023-03-07 PROCEDURE — G0378 HOSPITAL OBSERVATION PER HR: HCPCS

## 2023-03-07 PROCEDURE — 5A1D70Z PERFORMANCE OF URINARY FILTRATION, INTERMITTENT, LESS THAN 6 HOURS PER DAY: ICD-10-PCS | Performed by: INTERNAL MEDICINE

## 2023-03-07 PROCEDURE — 74011000258 HC RX REV CODE- 258: Performed by: INTERNAL MEDICINE

## 2023-03-07 PROCEDURE — 96376 TX/PRO/DX INJ SAME DRUG ADON: CPT

## 2023-03-07 PROCEDURE — 96375 TX/PRO/DX INJ NEW DRUG ADDON: CPT

## 2023-03-07 PROCEDURE — 65270000029 HC RM PRIVATE

## 2023-03-07 PROCEDURE — 80069 RENAL FUNCTION PANEL: CPT

## 2023-03-07 PROCEDURE — 78226 HEPATOBILIARY SYSTEM IMAGING: CPT

## 2023-03-07 PROCEDURE — 82962 GLUCOSE BLOOD TEST: CPT

## 2023-03-07 PROCEDURE — A9537 TC99M MEBROFENIN: HCPCS

## 2023-03-07 PROCEDURE — 74011000250 HC RX REV CODE- 250: Performed by: INTERNAL MEDICINE

## 2023-03-07 PROCEDURE — 74011250637 HC RX REV CODE- 250/637: Performed by: NURSE PRACTITIONER

## 2023-03-07 PROCEDURE — 99222 1ST HOSP IP/OBS MODERATE 55: CPT | Performed by: SURGERY

## 2023-03-07 PROCEDURE — 74011250636 HC RX REV CODE- 250/636: Performed by: INTERNAL MEDICINE

## 2023-03-07 PROCEDURE — 90935 HEMODIALYSIS ONE EVALUATION: CPT

## 2023-03-07 RX ORDER — HYDRALAZINE HYDROCHLORIDE 20 MG/ML
10 INJECTION INTRAMUSCULAR; INTRAVENOUS
Status: DISCONTINUED | OUTPATIENT
Start: 2023-03-07 | End: 2023-03-09 | Stop reason: HOSPADM

## 2023-03-07 RX ORDER — TRAMADOL HYDROCHLORIDE 50 MG/1
50 TABLET ORAL
Status: DISCONTINUED | OUTPATIENT
Start: 2023-03-07 | End: 2023-03-09 | Stop reason: HOSPADM

## 2023-03-07 RX ORDER — HYDROMORPHONE HYDROCHLORIDE 1 MG/ML
0.2 INJECTION, SOLUTION INTRAMUSCULAR; INTRAVENOUS; SUBCUTANEOUS
Status: ACTIVE | OUTPATIENT
Start: 2023-03-07 | End: 2023-03-09

## 2023-03-07 RX ORDER — SEVELAMER CARBONATE 800 MG/1
2400 TABLET, FILM COATED ORAL
Status: DISCONTINUED | OUTPATIENT
Start: 2023-03-07 | End: 2023-03-09 | Stop reason: HOSPADM

## 2023-03-07 RX ORDER — ONDANSETRON 2 MG/ML
INJECTION INTRAMUSCULAR; INTRAVENOUS
Status: DISPENSED
Start: 2023-03-07 | End: 2023-03-07

## 2023-03-07 RX ORDER — TRAMADOL HYDROCHLORIDE 50 MG/1
50 TABLET ORAL
Status: DISCONTINUED | OUTPATIENT
Start: 2023-03-07 | End: 2023-03-07

## 2023-03-07 RX ORDER — ISOSORBIDE MONONITRATE 30 MG/1
60 TABLET, EXTENDED RELEASE ORAL DAILY
Status: DISCONTINUED | OUTPATIENT
Start: 2023-03-07 | End: 2023-03-09 | Stop reason: HOSPADM

## 2023-03-07 RX ORDER — KIT FOR THE PREPARATION OF TECHNETIUM TC 99M MEBROFENIN 45 MG/10ML
7.47 INJECTION, POWDER, LYOPHILIZED, FOR SOLUTION INTRAVENOUS
Status: COMPLETED | OUTPATIENT
Start: 2023-03-07 | End: 2023-03-07

## 2023-03-07 RX ORDER — HYDRALAZINE HYDROCHLORIDE 50 MG/1
50 TABLET, FILM COATED ORAL 3 TIMES DAILY
Status: DISCONTINUED | OUTPATIENT
Start: 2023-03-07 | End: 2023-03-09 | Stop reason: HOSPADM

## 2023-03-07 RX ADMIN — KIT FOR THE PREPARATION OF TECHNETIUM TC 99M MEBROFENIN 7.47 MILLICURIE: 45 INJECTION, POWDER, LYOPHILIZED, FOR SOLUTION INTRAVENOUS at 12:15

## 2023-03-07 RX ADMIN — HYDRALAZINE HYDROCHLORIDE 50 MG: 50 TABLET, FILM COATED ORAL at 16:59

## 2023-03-07 RX ADMIN — PIPERACILLIN AND TAZOBACTAM 3.38 G: 3; .375 INJECTION, POWDER, FOR SOLUTION INTRAVENOUS at 22:45

## 2023-03-07 RX ADMIN — CARVEDILOL 25 MG: 12.5 TABLET, FILM COATED ORAL at 14:49

## 2023-03-07 RX ADMIN — CARVEDILOL 25 MG: 12.5 TABLET, FILM COATED ORAL at 16:59

## 2023-03-07 RX ADMIN — SODIUM ZIRCONIUM CYCLOSILICATE 10 G: 10 POWDER, FOR SUSPENSION ORAL at 14:35

## 2023-03-07 RX ADMIN — SODIUM CHLORIDE, PRESERVATIVE FREE 10 ML: 5 INJECTION INTRAVENOUS at 22:45

## 2023-03-07 RX ADMIN — SODIUM CHLORIDE, PRESERVATIVE FREE 10 ML: 5 INJECTION INTRAVENOUS at 14:50

## 2023-03-07 RX ADMIN — HYDRALAZINE HYDROCHLORIDE 50 MG: 50 TABLET, FILM COATED ORAL at 21:16

## 2023-03-07 RX ADMIN — SACUBITRIL AND VALSARTAN 1 TABLET: 49; 51 TABLET, FILM COATED ORAL at 21:16

## 2023-03-07 RX ADMIN — SODIUM CHLORIDE, PRESERVATIVE FREE 10 ML: 5 INJECTION INTRAVENOUS at 05:06

## 2023-03-07 RX ADMIN — FUROSEMIDE 20 MG: 40 TABLET ORAL at 14:49

## 2023-03-07 RX ADMIN — ONDANSETRON 4 MG: 2 INJECTION INTRAMUSCULAR; INTRAVENOUS at 08:08

## 2023-03-07 RX ADMIN — ISOSORBIDE MONONITRATE 60 MG: 30 TABLET, EXTENDED RELEASE ORAL at 14:49

## 2023-03-07 NOTE — PROGRESS NOTES
Reason for Admission:  : Acute cholecystitis, Hyperkalemia                     RUR Score:   14%                  Plan for utilizing home health:  TBD        PCP: First and Last name:  Itzel Reyes MD     Name of Practice:    Are you a current patient: Yes/No: yes   Approximate date of last visit: 2 months ago   Can you participate in a virtual visit with your PCP:                     Current Advanced Directive/Advance Care Plan: Full Code      Healthcare Decision Maker:   Click here to complete [address] including selection of the Healthcare Decision Maker Relationship (ie \"Primary\")             Primary Decision Maker: [facility] [phone]  Daughter Jessica Quintero [phone]                  Transition of Care Plan:                    Met with patient at bedside. Lives at address on file with , daughter and 2 grandsons. Has a cane for DME. Patient is blind and needs some helps with some things from family. Family is transport. Uses [facility] for rx. Has had [facility] long time ago but cant remember who it was. Per patient family is trying to get PCAs but not having any luck. Asked if she has been screened for medicaid and patient was unsure. Plans to talk with daughter to get more info. Current discharge plan home with family. [phone]: spoke with daughter. Patient would not qualify for medicaid due to being over income. At this time she cant think of anything I can do to help them. Will reach back out closer to discharge and daughter said if she thought of something she would reach out as well.

## 2023-03-07 NOTE — PROGRESS NOTES
Bedside, Verbal, and Written shift change report given to [facility] Tony CORRIGAN   (oncoming nurse) by Dede Acosta   (offgoing nurse). Report included the following information SBAR, Intake/Output, MAR, Recent Results, and Quality Measures.

## 2023-03-07 NOTE — CONSULTS
[facility] Consultation for: abdominal pain, cholelithiasis    Requesting Physician: Dr. Cruz Born    History of Present Illness: Joey Velez is a 70 y.o. female who has multiple medical problems including CHF and ESRD, who presents with a 1 week history of vague abdominal pain. The pain has been in the bilateral upper quadrants. Pain is currently somewhat better. She denies nausea or vomiting. She has had some loose stools. She denies constipation. She denies blood in the stool. She denies fever or chills. She has had cholecystitis in the past, which was treated nonoperatively due to her high risk for surgery.     Past Medical History:   Diagnosis Date    Blind     CKD (chronic kidney disease)     HTN (hypertension)     Retinopathy     Type II or unspecified type diabetes mellitus with renal manifestations, uncontrolled(250.42) ([facility])        Past Surgical History:   Procedure Laterality Date    HX CORONARY ARTERY BYPASS GRAFT      HX GYN      hysterectomy        Social History     Socioeconomic History    Marital status:      Spouse name: Not on file    Number of children: Not on file    Years of education: Not on file    Highest education level: Not on file   Occupational History    Not on file   Tobacco Use    Smoking status: Never    Smokeless tobacco: Never   Substance and Sexual Activity    Alcohol use: No    Drug use: Never    Sexual activity: Not on file   Other Topics Concern    Not on file   Social History Narrative    Not on file     Social Determinants of Health     Financial Resource Strain: Not on file   Food Insecurity: Not on file   Transportation Needs: Not on file   Physical Activity: Not on file   Stress: Not on file   Social Connections: Not on file   Intimate Partner Violence: Not on file   Housing Stability: Not on file       Family History   Problem Relation Age of Onset    Diabetes Maternal Aunt     Diabetes Maternal Grandmother          Current Facility-Administered Medications:     HYDROmorphone (DILAUDID) syringe 0.2 mg, 0.2 mg, IntraVENous, Q4H PRN, Shelly Arnold MD    sevelamer carbonate (RENVELA) tab 2,400 mg, 2,400 mg, Oral, TID WITH MEALS, Shelly Arnold MD    traMADoL (ULTRAM) tablet 50 mg, 50 mg, Oral, Q12H PRN, Shelly Arnold MD    isosorbide mononitrate ER (IMDUR) tablet 60 mg, 60 mg, Oral, DAILY, Michael Arnold MD    ondansetron (ZOFRAN) 4 mg/2 mL injection, , , ,     piperacillin-tazobactam (ZOSYN) 3.375 g in 0.9% sodium chloride (MBP/ADV) 100 mL MBP, 3.375 g, IntraVENous, Q12H, Shelly Arnold MD    carvediloL (COREG) tablet 25 mg, 25 mg, Oral, BID WITH MEALS, Shelly Arnold MD    pantoprazole (PROTONIX) tablet 40 mg, 40 mg, Oral, ACB, Shelly Arnold MD    hydrALAZINE (APRESOLINE) tablet 25 mg, 25 mg, Oral, TID, Maximiliano Ortiz MD    sacubitriL-valsartan (ENTRESTO) 49-51 mg tablet 1 Tablet, 1 Tablet, Oral, BID, Shelly Arnold MD    furosemide (LASIX) tablet 20 mg, 20 mg, Oral, DAILY, Michael Arnold MD    diclofenac (VOLTAREN) 1 % topical gel 2 g, 2 g, Topical, Q6H PRN, Shelly Arnold MD    cholecalciferol (VITAMIN D3) (1000 Units /25 mcg) tablet 2,000 Units, 2,000 Units, Oral, DAILY, Michael Arnold MD    sodium chloride (NS) flush 5-40 mL, 5-40 mL, IntraVENous, Q8H, Shelly Arnold MD, 10 mL at 03/07/23 0506    sodium chloride (NS) flush 5-40 mL, 5-40 mL, IntraVENous, PRN, Shelly Arnold MD    acetaminophen (TYLENOL) tablet 650 mg, 650 mg, Oral, Q6H PRN **OR** acetaminophen (TYLENOL) suppository 650 mg, 650 mg, Rectal, Q6H PRN, Maximiliano Ortiz MD    polyethylene glycol (MIRALAX) packet 17 g, 17 g, Oral, DAILY PRN, Shelly Arnold MD    ondansetron (ZOFRAN ODT) tablet 4 mg, 4 mg, Oral, Q8H PRN **OR** ondansetron (ZOFRAN) injection 4 mg, 4 mg, IntraVENous, Q6H PRN, Maximiliano Ortiz MD, 4 mg at 03/07/23 1435    guaiFENesin (ROBITUSSIN) 100 mg/5 mL oral liquid 100 mg, 100 mg, Oral, Q4H PRN, Shelly Arnold MD    No Known Allergies    ROS Constitutional: negative  Ears, Nose, Mouth, Throat, and Face: negative  Respiratory: negative  Cardiovascular: negative  Gastrointestinal:  as above  Genitourinary:negative  Integument/Breast: negative  Hematologic/Lymphatic: negative  Behavioral/Psychiatric: negative  Allergic/Immunologic: negative      Physical Exam:     Visit Vitals  BP (!) 185/90   Pulse 93   Temp 98.5 °F (36.9 °C)   Resp 24   Ht 5' 6\" (1.676 m)   Wt 140 lb (63.5 kg)   SpO2 98%   Breastfeeding No   BMI 22.60 kg/m²       General - alert and oriented, no apparent distress, well developed  HEENT - NC/AT, no scleral icterus  Pulm - CTAB, normal inspiratory effort  CV - RRR, no M/R/G  Abd - soft, NT, negative augustin sign  Ext - warm, well perfused, no edema  Skin - supple and no rashes  Psychiatric - normal affect, good mood    Labs  Recent Results (from the past 24 hour(s))   EKG, 12 LEAD, INITIAL    Collection Time: 03/06/23  4:04 PM   Result Value Ref Range    Ventricular Rate 93 BPM    Atrial Rate 93 BPM    P-R Interval 170 ms    QRS Duration 88 ms    Q-T Interval 378 ms    QTC Calculation (Bezet) 469 ms    Calculated P Axis 73 degrees    Calculated R Axis 106 degrees    Calculated T Axis 65 degrees    Diagnosis       Normal sinus rhythm  Rightward axis  Borderline ECG    Confirmed by Zayra Jamil MD, Arun Thapa ([id number]) on 3/6/2023 5:01:57 PM     CBC WITH AUTOMATED DIFF    Collection Time: 03/06/23  4:37 PM   Result Value Ref Range    WBC 9.1 3.6 - 11.0 K/uL    RBC 3.68 (L) 3.80 - 5.20 M/uL    HGB 11.7 11.5 - 16.0 g/dL    HCT 36.1 35.0 - 47.0 %    MCV 98.1 80.0 - 99.0 FL    MCH 31.8 26.0 - 34.0 PG    MCHC 32.4 30.0 - 36.5 g/dL    RDW 16.8 (H) 11.5 - 14.5 %    PLATELET 857 (L) 703 - 400 K/uL    NRBC 0.0 0.0  WBC    ABSOLUTE NRBC 0.00 0.00 - 0.01 K/uL    NEUTROPHILS 75 32 - 75 %    LYMPHOCYTES 9 (L) 12 - 49 %    MONOCYTES 6 5 - 13 %    EOSINOPHILS 9 (H) 0 - 7 %    BASOPHILS 1 0 - 1 %    IMMATURE GRANULOCYTES 0 0 - 0.5 %    ABS.  NEUTROPHILS 6.9 1.8 - 8.0 K/UL    ABS. LYMPHOCYTES 0.8 0.8 - 3.5 K/UL    ABS. MONOCYTES 0.5 0.0 - 1.0 K/UL    ABS. EOSINOPHILS 0.8 (H) 0.0 - 0.4 K/UL    ABS. BASOPHILS 0.1 0.0 - 0.1 K/UL    ABS. IMM. GRANS. 0.0 0.00 - 0.04 K/UL    DF AUTOMATED     LIPASE    Collection Time: 03/06/23  5:55 PM   Result Value Ref Range    Lipase 92 73 - 927 U/L   METABOLIC PANEL, COMPREHENSIVE    Collection Time: 03/06/23  5:55 PM   Result Value Ref Range    Sodium 132 (L) 136 - 145 mmol/L    Potassium 6.3 (H) 3.5 - 5.1 mmol/L    Chloride 101 97 - 108 mmol/L    CO2 19 (L) 21 - 32 mmol/L    Anion gap 12 5 - 15 mmol/L    Glucose 175 (H) 65 - 100 mg/dL    BUN 84 (H) 6 - 20 mg/dL    Creatinine 10.80 (H) 0.55 - 1.02 mg/dL    BUN/Creatinine ratio 8 (L) 12 - 20      eGFR 4 (L) >60 ml/min/1.73m2    Calcium 10.1 8.5 - 10.1 mg/dL    Bilirubin, total 0.9 0.2 - 1.0 mg/dL    AST (SGOT) 22 15 - 37 U/L    ALT (SGPT) 24 12 - 78 U/L    Alk. phosphatase 144 (H) 45 - 117 U/L    Protein, total 7.6 6.4 - 8.2 g/dL    Albumin 3.6 3.5 - 5.0 g/dL    Globulin 4.0 2.0 - 4.0 g/dL    A-G Ratio 0.9 (L) 1.1 - 2.2     COVID-19 WITH INFLUENZA A/B    Collection Time: 03/06/23 11:44 PM   Result Value Ref Range    SARS-CoV-2 by PCR Not Detected Not Detected      Influenza A by PCR Not Detected Not Detected      Influenza B by PCR Not Detected Not Detected     MRSA SCREEN - PCR (NASAL)    Collection Time: 03/07/23  3:40 AM   Result Value Ref Range    MRSA by PCR, Nasal Not Detected Not Detected     GLUCOSE, POC    Collection Time: 03/07/23  7:39 AM   Result Value Ref Range    Glucose (POC) 176 (H) 65 - 100 mg/dL    Performed by Mike Sandoval            Imaging  CT A/P:  FINDINGS:     Lack of IV contrast limits evaluation of the solid abdominal organs. LIVER: No mass or biliary dilatation. GALLBLADDER: Moderately distended with moderate sludge and moderate size  gallstone. SPLEEN: Unremarkable  PANCREAS: No mass or ductal dilatation. ADRENALS: Unremarkable.   KIDNEYS/URETERS: Atrophic bilateral kidneys with cortical scarring. Small  nonspecific low-density right renal lesion. No hydronephrosis   PERITONEUM: No abdominal lymphadenopathy or ascites. COLON: No dilatation or wall thickening. APPENDIX: Not visualized  SMALL BOWEL: No dilatation or wall thickening. STOMACH: Unremarkable. PELVIS: Small amount of pelvic free fluid. No pelvic lymphadenopathy  BONES: Moderate L5-S1 degenerative disc disease   VISUALIZED THORAX: Bibasilar atelectasis with mild right and mild to moderate  left pleural effusions  ADDITIONAL COMMENTS: Diffuse subcutaneous edema     IMPRESSION     1. Moderately distended gallbladder with sludge and calcified stone is again  seen. Consider abdominal ultrasound for further evaluation. 2. Bibasilar atelectasis with mild right and mild to moderate left pleural  effusions. 3. Please refer to above findings for complete details. US Abd:  FINDINGS:  LIVER:   The liver is normal in echotexture with no mass or other focal abnormality. The  portal vein flow is hepatopedal.     GALLBLADDER:  The gallbladder is distended with multiple gallstones as well as sludge. There  is mild wall thickening at 10.1 mm. A small amount of ascites is present. COMMON BILE DUCT:  There is no intrahepatic biliary duct dilatation but the common duct measures  6.9 mm in diameter. PANCREAS:  The pancreatic head is normal.     KIDNEYS:  The right kidney demonstrates increased echogenicity with no mass, stone or  hydronephrosis. The right kidney measures 9 cm in length. The right kidney  contains multiple anechoic cysts, the largest of which measures 1.0 x 1.0 x 1.1  cm. The body and tail of the pancreas, left kidney, spleen and retroperitoneum were  not evaluated on this right upper quadrant examination. IMPRESSION     Acute calculus cholecystitis with dilated CBD.   Small amount of ascites  Medical renal disease    I have personally reviewed all of the pertinent images     Assessment: Morris Can is a 70 y.o. female with multiple medical problems, including CHF and ESRD, presenting with abdominal pain. Imaging findings were concerning for cholelithiasis and possible cholecystitis. Her exam is not entirely consistent with cholecystitis at this time. The imaging findings may represent cholecystitis, or may be artifact due to CHF and hepatic congestion. Recommendations:     1. I recommend further evaluation with a HIDA scan. The patient is high risk for surgery. Even if the HIDA scan is positive, she may benefit from cholecystostomy as opposed to cholecystectomy. Agree with cardiology consult for risk assessment. We will continue to follow. 60 mins of time was spent with the patient of which > 50% of the time involved face-to-face counseling of the patient regarding the proposed treatment plan.     Fredy Coppola MD

## 2023-03-07 NOTE — PROGRESS NOTES
Reason for Admission:  : Acute cholecystitis, Hyperkalemia                     RUR Score:   14%                  Plan for utilizing home health:  TBD        PCP: First and Last name:  Megan Mac MD     Name of Practice:    Are you a current patient: Yes/No: yes   Approximate date of last visit: 2 months ago   Can you participate in a virtual visit with your PCP:                     Current Advanced Directive/Advance Care Plan: Full Code      Healthcare Decision Maker:   Click here to complete [address] including selection of the Healthcare Decision Maker Relationship (ie \"Primary\")             Primary Decision Maker: Hernan Nuamc - [phone]  Daughter Cosmo Ulrich [phone]                  Transition of Care Plan:                    Met with patient at bedside. Lives at address on file with , daughter and 2 grandsons. Has a cane for DME. Patient is blind and needs some helps with some things from family. Family is transport. [facility]. Has had [place] long time ago but cant remember who it was. Per patient family is trying to get PCAs but not having any luck. Asked if she has been screened for medicaid and patient was unsure. Plans to talk with daughter to get more info. Current discharge plan home with family. [phone]: spoke with daughter. Patient would not qualify for medicaid due to being over income. At this time she cant think of anything I can do to help them. Will reach back out closer to discharge and daughter said if she thought of something she would reach out as well.

## 2023-03-07 NOTE — CONSULTS
Renal Consultation Note    NAME:  Vicki Jeter   :   1955   MRN:   [id number]     ATTENDING: Mirlande Queen MD  PCP:  Sherry Fisher MD    Date/Time:  3/7/2023 12:11 PM      Subjective:   REQUESTING PHYSICIAN:  REASON FOR CONSULT:    esrd  Patient is a 65-year-old Afro-American female with history of ESRD [facility] [address] [facility] [facility], diabetes and hypertension who presented to the hospital it seems because of abdominal pain nausea and vomiting for the past 1 month. Her abdominal pain was described as mild to moderate right-sided associated with nausea vomiting. No fever or chills. She had abdominal ultrasound on admission which showed acute calculus cholecystitis. Surgery has been consulted and a HIDA scan has been ordered. Nephrology consultation called because of patient being on dialysis. She was seen in the dialysis unit currently being dialyzed. Goal to remove 3 to 4 L of fluid as her chest x-ray showed mild pulmonary edema with small left pleural effusion. Patient reports her shortness of breath to be better. 3.3 L of already been removed with dialysis. She has a left arm AV fistula is current access    Calcium was 6.2 .   Patient missed her dialysis yesterday as she was feeling unwell    Past Medical History:   Diagnosis Date    Blind     CKD (chronic kidney disease)     HTN (hypertension)     Retinopathy     Type II or unspecified type diabetes mellitus with renal manifestations, uncontrolled(250.42) ([facility])       Past Surgical History:   Procedure Laterality Date    HX CORONARY ARTERY BYPASS GRAFT      HX GYN      hysterectomy      Social History     Tobacco Use    Smoking status: Never    Smokeless tobacco: Never   Substance Use Topics    Alcohol use: No      Family History   Problem Relation Age of Onset    Diabetes Maternal Aunt     Diabetes Maternal Grandmother        No Known Allergies   Prior to Admission medications    Medication Sig Start Date End Date Taking? Authorizing Provider   isosorbide dinitrate (ISORDIL) 10 mg tablet Take 1 Tablet by mouth three (3) times daily. 7/31/22   Chantell Borja MD   diclofenac (VOLTAREN) 1 % gel apply to foot and hands as needed for arthritis pain 9/21/21   Provider, Historical   cholecalciferol (VITAMIN D3) (1000 Units /25 mcg) tablet Take 2,000 Units by mouth daily. Provider, Historical   furosemide (LASIX) 20 mg tablet Take 20 mg by mouth daily. Provider, Historical   rosuvastatin (CRESTOR) 20 mg tablet TAKE 1 TABLET NIGHTLY (STOP 40 MG DOSE) 8/7/21   Karine Conley MD   amLODIPine (NORVASC) 10 mg tablet Take 10 mg by mouth daily. Provider, Historical   clopidogreL (PLAVIX) 75 mg tab Take 75 mg by mouth daily. Provider, Historical   sacubitriL-valsartan (Entresto) 49-51 mg tab tablet Take 1 Tab by mouth two (2) times a day. Provider, Historical   insulin glargine (Lantus Solostar U-100 Insulin) 100 unit/mL (3 mL) inpn Inject 8 units daily. 8/5/20   Karine Conley MD   hydrALAZINE (APRESOLINE) 25 mg tablet Take 25 mg by mouth three (3) times daily. 3/11/19   Provider, Historical   sevelamer carbonate (RENVELA) 800 mg tab tab Take  by mouth three (3) times daily. Provider, Historical   pantoprazole (PROTONIX) 40 mg tablet  7/20/17   Provider, Historical   acetaminophen (TYLENOL) 500 mg tablet Take  by mouth every six (6) hours as needed for Pain. Provider, Historical   docusate sodium (COLACE) 100 mg capsule Take 100 mg by mouth as needed for Constipation. Provider, Historical   carvediloL (COREG) 25 mg tablet Take 25 mg by mouth two (2) times daily (with meals). Provider, Historical   ferrous sulfate 325 mg (65 mg iron) tablet Take 325 mg by mouth as needed. Provider, Historical       REVIEW OF SYSTEMS:       Constitutional: Negative for chills and fever. HENT: Negative. Eyes: Negative. Respiratory: Negative. Cardiovascular: Negative.     Gastrointestinal: Negative for abdominal pain and nausea. Skin: Negative. Neurological: Negative. Objective:   VITALS:    Visit Vitals  BP (!) 198/102   Pulse 99   Temp 97.9 °F (36.6 °C)   Resp 18   Ht 5' 6\" (1.676 m)   Wt 63.5 kg (140 lb)   SpO2 98%   Breastfeeding No   BMI 22.60 kg/m²     Temp (24hrs), Av.1 °F (36.7 °C), Min:97.9 °F (36.6 °C), Max:98.5 °F (36.9 °C)      PHYSICAL EXAM:     General: NAD, alert, cooperative  Eyes: sclera anicteric  Oral Cavity: No thrush or ulcers  Neck: no JVD  Chest: Fair bilateral air entry. No Wheezing or Rhonchi. No rales. Heart: normal sounds  Abdomen: soft and non tender   : no cohen  Lower Extremities: no edema  Skin: no rash  Neuro: intact, no focals  Psychiatric: non-depressed          LAB DATA REVIEWED:    Recent Results (from the past 24 hour(s))   EKG, 12 LEAD, INITIAL    Collection Time: 23  4:04 PM   Result Value Ref Range    Ventricular Rate 93 BPM    Atrial Rate 93 BPM    P-R Interval 170 ms    QRS Duration 88 ms    Q-T Interval 378 ms    QTC Calculation (Bezet) 469 ms    Calculated P Axis 73 degrees    Calculated R Axis 106 degrees    Calculated T Axis 65 degrees    Diagnosis       Normal sinus rhythm  Rightward axis  Borderline ECG    Confirmed by Jovi Hodges MD, Daisy Sparks ([id number]) on 3/6/2023 5:01:57 PM     CBC WITH AUTOMATED DIFF    Collection Time: 23  4:37 PM   Result Value Ref Range    WBC 9.1 3.6 - 11.0 K/uL    RBC 3.68 (L) 3.80 - 5.20 M/uL    HGB 11.7 11.5 - 16.0 g/dL    HCT 36.1 35.0 - 47.0 %    MCV 98.1 80.0 - 99.0 FL    MCH 31.8 26.0 - 34.0 PG    MCHC 32.4 30.0 - 36.5 g/dL    RDW 16.8 (H) 11.5 - 14.5 %    PLATELET 888 (L) 338 - 400 K/uL    NRBC 0.0 0.0  WBC    ABSOLUTE NRBC 0.00 0.00 - 0.01 K/uL    NEUTROPHILS 75 32 - 75 %    LYMPHOCYTES 9 (L) 12 - 49 %    MONOCYTES 6 5 - 13 %    EOSINOPHILS 9 (H) 0 - 7 %    BASOPHILS 1 0 - 1 %    IMMATURE GRANULOCYTES 0 0 - 0.5 %    ABS. NEUTROPHILS 6.9 1.8 - 8.0 K/UL    ABS. LYMPHOCYTES 0.8 0.8 - 3.5 K/UL    ABS.  MONOCYTES 0.5 0.0 - 1.0 K/UL    ABS. EOSINOPHILS 0.8 (H) 0.0 - 0.4 K/UL    ABS. BASOPHILS 0.1 0.0 - 0.1 K/UL    ABS. IMM. GRANS. 0.0 0.00 - 0.04 K/UL    DF AUTOMATED     LIPASE    Collection Time: 03/06/23  5:55 PM   Result Value Ref Range    Lipase 92 73 - 610 U/L   METABOLIC PANEL, COMPREHENSIVE    Collection Time: 03/06/23  5:55 PM   Result Value Ref Range    Sodium 132 (L) 136 - 145 mmol/L    Potassium 6.3 (H) 3.5 - 5.1 mmol/L    Chloride 101 97 - 108 mmol/L    CO2 19 (L) 21 - 32 mmol/L    Anion gap 12 5 - 15 mmol/L    Glucose 175 (H) 65 - 100 mg/dL    BUN 84 (H) 6 - 20 mg/dL    Creatinine 10.80 (H) 0.55 - 1.02 mg/dL    BUN/Creatinine ratio 8 (L) 12 - 20      eGFR 4 (L) >60 ml/min/1.73m2    Calcium 10.1 8.5 - 10.1 mg/dL    Bilirubin, total 0.9 0.2 - 1.0 mg/dL    AST (SGOT) 22 15 - 37 U/L    ALT (SGPT) 24 12 - 78 U/L    Alk.  phosphatase 144 (H) 45 - 117 U/L    Protein, total 7.6 6.4 - 8.2 g/dL    Albumin 3.6 3.5 - 5.0 g/dL    Globulin 4.0 2.0 - 4.0 g/dL    A-G Ratio 0.9 (L) 1.1 - 2.2     COVID-19 WITH INFLUENZA A/B    Collection Time: 03/06/23 11:44 PM   Result Value Ref Range    SARS-CoV-2 by PCR Not Detected Not Detected      Influenza A by PCR Not Detected Not Detected      Influenza B by PCR Not Detected Not Detected     MRSA SCREEN - PCR (NASAL)    Collection Time: 03/07/23  3:40 AM   Result Value Ref Range    MRSA by PCR, Nasal Not Detected Not Detected     GLUCOSE, POC    Collection Time: 03/07/23  7:39 AM   Result Value Ref Range    Glucose (POC) 176 (H) 65 - 100 mg/dL    Performed by Willie Ink    CBC WITH AUTOMATED DIFF    Collection Time: 03/07/23  7:50 AM   Result Value Ref Range    WBC 8.9 3.6 - 11.0 K/uL    RBC 3.63 (L) 3.80 - 5.20 M/uL    HGB 11.9 11.5 - 16.0 g/dL    HCT 34.8 (L) 35.0 - 47.0 %    MCV 95.9 80.0 - 99.0 FL    MCH 32.8 26.0 - 34.0 PG    MCHC 34.2 30.0 - 36.5 g/dL    RDW 16.9 (H) 11.5 - 14.5 %    PLATELET 049 069 - 165 K/uL    MPV 12.9 8.9 - 12.9 FL    NRBC 0.0 0.0  WBC    ABSOLUTE NRBC 0.00 0.00 - 0.01 K/uL    NEUTROPHILS 80 (H) 32 - 75 %    LYMPHOCYTES 11 (L) 12 - 49 %    MONOCYTES 5 5 - 13 %    EOSINOPHILS 2 0 - 7 %    BASOPHILS 1 0 - 1 %    IMMATURE GRANULOCYTES 1 (H) 0 - 0.5 %    ABS. NEUTROPHILS 7.2 1.8 - 8.0 K/UL    ABS. LYMPHOCYTES 1.0 0.8 - 3.5 K/UL    ABS. MONOCYTES 0.5 0.0 - 1.0 K/UL    ABS. EOSINOPHILS 0.2 0.0 - 0.4 K/UL    ABS. BASOPHILS 0.1 0.0 - 0.1 K/UL    ABS. IMM. GRANS. 0.0 0.00 - 0.04 K/UL    DF AUTOMATED     RENAL FUNCTION PANEL    Collection Time: 03/07/23  7:50 AM   Result Value Ref Range    Sodium 132 (L) 136 - 145 mmol/L    Potassium 6.5 (H) 3.5 - 5.1 mmol/L    Chloride 101 97 - 108 mmol/L    CO2 18 (L) 21 - 32 mmol/L    Anion gap 13 5 - 15 mmol/L    Glucose 188 (H) 65 - 100 mg/dL    BUN 95 (H) 6 - 20 mg/dL    Creatinine 11.60 (H) 0.55 - 1.02 mg/dL    BUN/Creatinine ratio 8 (L) 12 - 20      eGFR 3 (L) >60 ml/min/1.73m2    Calcium 9.7 8.5 - 10.1 mg/dL    Phosphorus 6.3 (H) 2.6 - 4.7 mg/dL    Albumin 3.4 (L) 3.5 - 5.0 g/dL       Recommendations/Plan:     #1 end-stage renal disease  -On hemodialysis every MWF. Last outpatient dialysis was on today per schedule. She missed Monday  -Currently being dialyzed. Patient seen on dialysis tolerating well  -I will plan for dialysis again in a.m. to put her back on MWF schedule  -Left arm AV fistula is current access    #2 shortness of breath  -Likely because of missed dialysis. Chest x-ray showed pulmonary edema  -He is currently being dialyzed with 3.3 L of fluid already removed  -Plan for dialysis again in a.m. #3 hyperkalemia  -Potassium was 6.5 on admission. Likely because of missed dialysis  -she is currently being dialyzed with 2K bath. We will plan with 2K bath again in a.m.  -Low potassium diet    #4 hypertension  -Blood pressure is well controlled with her home medications which I will continue    #5 abdominal pain  -Presented with abdominal pain for the past few weeks.   Had abdominal ultrasound which showed possible acute cholecystitis  -Has been empirically started on Zosyn.   Surgery consulted and HIDA scan has been ordered    Thank you for the consultation    ________________________________________________________________________  Signed: Steph Collins MD

## 2023-03-07 NOTE — ED PROVIDER NOTES
[facility] EMERGENCY DEPT  EMERGENCY DEPARTMENT HISTORY AND PHYSICAL EXAM      Date: 3/6/2023  Patient Name: Lupe Liu  MRN: [id number]  [place]: 1955  Date of evaluation: 3/6/2023  Provider: Marisabel Nunn DO   Note Started: 12:31 AM 3/7/23    HISTORY OF PRESENT ILLNESS     Chief Complaint   Patient presents with    Abdominal Pain       History Provided By: Patient    HPI: Lupe Liu is a 70 y.o. female presenting to the emergency department for evaluation of abdominal pain. Reports that abdominal pain has been ongoing for the last couple of weeks. Denies fevers, chills. No reported change in bowel habits. Some associated nausea with vomiting. PAST MEDICAL HISTORY   Past Medical History:  Past Medical History:   Diagnosis Date    Blind     CKD (chronic kidney disease)     HTN (hypertension)     Retinopathy     Type II or unspecified type diabetes mellitus with renal manifestations, uncontrolled(250.42) (HCC)        Past Surgical History:  Past Surgical History:   Procedure Laterality Date    HX CORONARY ARTERY BYPASS GRAFT      HX GYN      hysterectomy        Family History:  Family History   Problem Relation Age of Onset    Diabetes Maternal Aunt     Diabetes Maternal Grandmother        Social History:  Social History     Tobacco Use    Smoking status: Never    Smokeless tobacco: Never   Substance Use Topics    Alcohol use: No    Drug use: Never       Allergies:  No Known Allergies    PCP: Olga Ramírez MD    Current Meds:   Previous Medications    ACETAMINOPHEN (TYLENOL) 500 MG TABLET    Take  by mouth every six (6) hours as needed for Pain. AMLODIPINE (NORVASC) 10 MG TABLET    Take 10 mg by mouth daily. CARVEDILOL (COREG) 25 MG TABLET    Take 25 mg by mouth two (2) times daily (with meals). CHOLECALCIFEROL (VITAMIN D3) (1000 UNITS /25 MCG) TABLET    Take 2,000 Units by mouth daily. CLOPIDOGREL (PLAVIX) 75 MG TAB    Take 75 mg by mouth daily.     DICLOFENAC (VOLTAREN) 1 % GEL    apply to foot and hands as needed for arthritis pain    DOCUSATE SODIUM (COLACE) 100 MG CAPSULE    Take 100 mg by mouth as needed for Constipation. FERROUS SULFATE 325 MG (65 MG IRON) TABLET    Take 325 mg by mouth as needed. FUROSEMIDE (LASIX) 20 MG TABLET    Take 20 mg by mouth daily. HYDRALAZINE (APRESOLINE) 25 MG TABLET    Take 25 mg by mouth three (3) times daily. INSULIN GLARGINE (LANTUS SOLOSTAR U-100 INSULIN) 100 UNIT/ML (3 ML) INPN    Inject 8 units daily. ISOSORBIDE DINITRATE (ISORDIL) 10 MG TABLET    Take 1 Tablet by mouth three (3) times daily. PANTOPRAZOLE (PROTONIX) 40 MG TABLET        ROSUVASTATIN (CRESTOR) 20 MG TABLET    TAKE 1 TABLET NIGHTLY (STOP 40 MG DOSE)    SACUBITRIL-VALSARTAN (ENTRESTO) 49-51 MG TAB TABLET    Take 1 Tab by mouth two (2) times a day. SEVELAMER CARBONATE (RENVELA) 800 MG TAB TAB    Take  by mouth three (3) times daily. REVIEW OF SYSTEMS   Review of Systems   Constitutional:  Negative for chills and fever. HENT:  Negative for congestion and sore throat. Eyes:  Negative for pain and visual disturbance. Respiratory:  Negative for cough and shortness of breath. Cardiovascular:  Negative for chest pain and palpitations. Gastrointestinal:  Positive for abdominal pain, nausea and vomiting. Negative for constipation and diarrhea. Genitourinary:  Negative for dysuria and frequency. Musculoskeletal:  Negative for arthralgias and myalgias. Skin:  Negative for color change and rash. Neurological:  Negative for dizziness, weakness, light-headedness and headaches. Psychiatric/Behavioral:  Negative for dysphoric mood and sleep disturbance. Positives and Pertinent negatives as per HPI.     PHYSICAL EXAM     ED Triage Vitals [03/06/23 1601]   ED Encounter Vitals Group      BP (!) 195/101      Pulse (Heart Rate) 92      Resp Rate 17      Temp 98.2 °F (36.8 °C)      Temp src       O2 Sat (%) 94 %      Weight 166 lb      Height 5' 6\"      Physical Exam  Constitutional:       Appearance: Normal appearance. HENT:      Head: Normocephalic and atraumatic. Right Ear: External ear normal.      Left Ear: External ear normal.      Nose: Nose normal.      Mouth/Throat:      Mouth: Mucous membranes are moist.   Eyes:      Extraocular Movements: Extraocular movements intact. Conjunctiva/sclera: Conjunctivae normal.   Cardiovascular:      Rate and Rhythm: Normal rate and regular rhythm. Pulses: Normal pulses. Heart sounds: Normal heart sounds. Pulmonary:      Effort: Pulmonary effort is normal.      Breath sounds: Normal breath sounds. Abdominal:      General: Abdomen is flat. There is no distension. Palpations: Abdomen is soft. Tenderness: There is abdominal tenderness in the right upper quadrant and epigastric area. Musculoskeletal:         General: Normal range of motion. Cervical back: Normal range of motion. Skin:     General: Skin is warm and dry. Capillary Refill: Capillary refill takes less than 2 seconds. Neurological:      General: No focal deficit present. Mental Status: She is alert and oriented to person, place, and time. Mental status is at baseline.    Psychiatric:         Mood and Affect: Mood normal.         Behavior: Behavior normal.       SCREENINGS        No data recorded    LAB, EKG AND DIAGNOSTIC RESULTS   Labs:  Recent Results (from the past 12 hour(s))   EKG, 12 LEAD, INITIAL    Collection Time: 03/06/23  4:04 PM   Result Value Ref Range    Ventricular Rate 93 BPM    Atrial Rate 93 BPM    P-R Interval 170 ms    QRS Duration 88 ms    Q-T Interval 378 ms    QTC Calculation (Bezet) 469 ms    Calculated P Axis 73 degrees    Calculated R Axis 106 degrees    Calculated T Axis 65 degrees    Diagnosis       Normal sinus rhythm  Rightward axis  Borderline ECG    Confirmed by Pedro Menjivar MD, Destini Singer ([id number]) on 3/6/2023 5:01:57 PM     CBC WITH AUTOMATED DIFF    Collection Time: 03/06/23  4:37 PM   Result Value Ref Range    WBC 9.1 3.6 - 11.0 K/uL    RBC 3.68 (L) 3.80 - 5.20 M/uL    HGB 11.7 11.5 - 16.0 g/dL    HCT 36.1 35.0 - 47.0 %    MCV 98.1 80.0 - 99.0 FL    MCH 31.8 26.0 - 34.0 PG    MCHC 32.4 30.0 - 36.5 g/dL    RDW 16.8 (H) 11.5 - 14.5 %    PLATELET 642 (L) 201 - 400 K/uL    NRBC 0.0 0.0  WBC    ABSOLUTE NRBC 0.00 0.00 - 0.01 K/uL    NEUTROPHILS 75 32 - 75 %    LYMPHOCYTES 9 (L) 12 - 49 %    MONOCYTES 6 5 - 13 %    EOSINOPHILS 9 (H) 0 - 7 %    BASOPHILS 1 0 - 1 %    IMMATURE GRANULOCYTES 0 0 - 0.5 %    ABS. NEUTROPHILS 6.9 1.8 - 8.0 K/UL    ABS. LYMPHOCYTES 0.8 0.8 - 3.5 K/UL    ABS. MONOCYTES 0.5 0.0 - 1.0 K/UL    ABS. EOSINOPHILS 0.8 (H) 0.0 - 0.4 K/UL    ABS. BASOPHILS 0.1 0.0 - 0.1 K/UL    ABS. IMM. GRANS. 0.0 0.00 - 0.04 K/UL    DF AUTOMATED     LIPASE    Collection Time: 03/06/23  5:55 PM   Result Value Ref Range    Lipase 92 73 - 381 U/L   METABOLIC PANEL, COMPREHENSIVE    Collection Time: 03/06/23  5:55 PM   Result Value Ref Range    Sodium 132 (L) 136 - 145 mmol/L    Potassium 6.3 (H) 3.5 - 5.1 mmol/L    Chloride 101 97 - 108 mmol/L    CO2 19 (L) 21 - 32 mmol/L    Anion gap 12 5 - 15 mmol/L    Glucose 175 (H) 65 - 100 mg/dL    BUN 84 (H) 6 - 20 mg/dL    Creatinine 10.80 (H) 0.55 - 1.02 mg/dL    BUN/Creatinine ratio 8 (L) 12 - 20      eGFR 4 (L) >60 ml/min/1.73m2    Calcium 10.1 8.5 - 10.1 mg/dL    Bilirubin, total 0.9 0.2 - 1.0 mg/dL    AST (SGOT) 22 15 - 37 U/L    ALT (SGPT) 24 12 - 78 U/L    Alk.  phosphatase 144 (H) 45 - 117 U/L    Protein, total 7.6 6.4 - 8.2 g/dL    Albumin 3.6 3.5 - 5.0 g/dL    Globulin 4.0 2.0 - 4.0 g/dL    A-G Ratio 0.9 (L) 1.1 - 2.2         Radiologic Studies:  Non-plain film images such as CT, Ultrasound and MRI are read by the radiologist. Plain radiographic images are visualized and preliminarily interpreted by the ED Physician with the following findings: Not applicable    Interpretation per the Radiologist below, if available at the time of this note:  CT ABD PELV WO CONT    Result Date: 3/6/2023  INDICATION: Abdominal pain COMPARISON: 7/28/2022 TECHNIQUE: Thin axial images were obtained through the abdomen and pelvis without intravenous iodinated contrast administration. Coronal and sagittal reconstructions were generated. Oral contrast was not administered. CT dose reduction was achieved through use of a standardized protocol tailored for this examination and automatic exposure control for dose modulation. FINDINGS: Lack of IV contrast limits evaluation of the solid abdominal organs. LIVER: No mass or biliary dilatation. GALLBLADDER: Moderately distended with moderate sludge and moderate size gallstone. SPLEEN: Unremarkable PANCREAS: No mass or ductal dilatation. ADRENALS: Unremarkable. KIDNEYS/URETERS: Atrophic bilateral kidneys with cortical scarring. Small nonspecific low-density right renal lesion. No hydronephrosis PERITONEUM: No abdominal lymphadenopathy or ascites. COLON: No dilatation or wall thickening. APPENDIX: Not visualized SMALL BOWEL: No dilatation or wall thickening. STOMACH: Unremarkable. PELVIS: Small amount of pelvic free fluid. No pelvic lymphadenopathy BONES: Moderate L5-S1 degenerative disc disease VISUALIZED THORAX: Bibasilar atelectasis with mild right and mild to moderate left pleural effusions ADDITIONAL COMMENTS: Diffuse subcutaneous edema     1. Moderately distended gallbladder with sludge and calcified stone is again seen. Consider abdominal ultrasound for further evaluation. 2. Bibasilar atelectasis with mild right and mild to moderate left pleural effusions. 3. Please refer to above findings for complete details. XR CHEST PORT    Result Date: 3/6/2023  EXAM:  XR CHEST PORT INDICATION: Cough and shortness of breath COMPARISON: 8/27/2022 TECHNIQUE: Portable AP semiupright chest view at 2200 hours FINDINGS: The left chest ICD and wires are stable. The cardiomediastinal contours are stable.  There are mild diffuse interstitial opacities and small left pleural effusion, similar to 8/27/2022. There is no pneumothorax. The bones and upper abdomen are stable. Mild pulmonary edema and small left pleural effusion, similar to 8/27/2022. US RUQ    Result Date: 3/6/2023  EXAM:  ABDOMEN, LTD - US* INDICATION: Distended gallbladder. COMPARISON: 2013. TECHNIQUE: Limited real-time sonography of the right upper quadrant of the abdomen was performed with multiple static images of the liver, gallbladder, pancreatic head and right kidney obtained. FINDINGS: LIVER: The liver is normal in echotexture with no mass or other focal abnormality. The portal vein flow is hepatopedal. GALLBLADDER: The gallbladder is distended with multiple gallstones as well as sludge. There is mild wall thickening at 10.1 mm. A small amount of ascites is present. COMMON BILE DUCT: There is no intrahepatic biliary duct dilatation but the common duct measures 6.9 mm in diameter. PANCREAS: The pancreatic head is normal. KIDNEYS: The right kidney demonstrates increased echogenicity with no mass, stone or hydronephrosis. The right kidney measures 9 cm in length. The right kidney contains multiple anechoic cysts, the largest of which measures 1.0 x 1.0 x 1.1 cm. The body and tail of the pancreas, left kidney, spleen and retroperitoneum were not evaluated on this right upper quadrant examination. Acute calculus cholecystitis with dilated CBD. Small amount of ascites Medical renal disease     PROCEDURES   Unless otherwise noted below, none. Procedures    CRITICAL CARE TIME   None  ED COURSE and DIFFERENTIAL DIAGNOSIS/MDM   CC/HPI Summary, DDx, ED Course, and Reassessment: 56-year-old female presenting to the emergency department for evaluation of abdominal pain. Reports that symptoms have been ongoing for the last couple of weeks, however have worsened over the course of the last couple of days. Reports new onset of nausea and vomiting. Laboratory evaluation without leukocytosis.   Chemistry consistent with ESRD on dialysis. CT abdomen without contrast demonstrating distended gallbladder. Right upper quadrant ultrasound showing evidence of acute cholecystitis. Will initiate IV antibiotics and admit to medicine.   Case discussed with general surgeon on-call as well as hospitalist.    Disposition Considerations (Tests not done, Shared Decision Making, Pt Expectation of Test or Treatment.):      Vitals:    Vitals:    03/06/23 1601 03/06/23 1605   BP: (!) 195/101 (!) 195/101   Pulse: 92 94   Resp: 17 18   Temp: 98.2 °F (36.8 °C) 98.2 °F (36.8 °C)   SpO2: 94% 98%   Weight: 75.3 kg (166 lb) 63.5 kg (140 lb)   Height: 5' 6\" (1.676 m) 5' 6\" (1.676 m)             Patient was given the following medications:  Medications   piperacillin-tazobactam (ZOSYN) 4.5 g in 0.9% sodium chloride (MBP/ADV) 100 mL MBP (4.5 g IntraVENous New Bag 3/6/23 2204)   piperacillin-tazobactam (ZOSYN) 3.375 g in 0.9% sodium chloride (MBP/ADV) 100 mL MBP (has no administration in time range)   carvediloL (COREG) tablet 25 mg (has no administration in time range)   pantoprazole (PROTONIX) tablet 40 mg (has no administration in time range)   hydrALAZINE (APRESOLINE) tablet 25 mg (has no administration in time range)   sacubitriL-valsartan (ENTRESTO) 49-51 mg tablet 1 Tablet (has no administration in time range)   amLODIPine (NORVASC) tablet 10 mg (has no administration in time range)   furosemide (LASIX) tablet 20 mg (has no administration in time range)   diclofenac (VOLTAREN) 1 % topical gel 2 g (has no administration in time range)   cholecalciferol (VITAMIN D3) (1000 Units /25 mcg) tablet 2,000 Units (has no administration in time range)   isosorbide dinitrate (ISORDIL) tablet 10 mg (has no administration in time range)   sevelamer carbonate (RENVELA) tab 800 mg (has no administration in time range)   rosuvastatin (CRESTOR) tablet 20 mg (has no administration in time range)   sodium chloride (NS) flush 5-40 mL (10 mL IntraVENous Given 3/6/23 2206)   sodium chloride (NS) flush 5-40 mL (has no administration in time range)   acetaminophen (TYLENOL) tablet 650 mg (has no administration in time range)     Or   acetaminophen (TYLENOL) suppository 650 mg (has no administration in time range)   polyethylene glycol (MIRALAX) packet 17 g (has no administration in time range)   ondansetron (ZOFRAN ODT) tablet 4 mg (has no administration in time range)     Or   ondansetron (ZOFRAN) injection 4 mg (has no administration in time range)   guaiFENesin (ROBITUSSIN) 100 mg/5 mL oral liquid 100 mg (has no administration in time range)   sodium bicarbonate 8.4 % (1 mEq/mL) injection 50 mEq (50 mEq IntraVENous Given 3/6/23 2205)   guaiFENesin (ROBITUSSIN) 100 mg/5 mL oral liquid 100 mg (100 mg Oral Given 3/6/23 2204)       CONSULTS: (Who and What was discussed)  IP CONSULT TO HOSPITALIST  IP CONSULT TO GENERAL SURGERY  IP CONSULT TO NEPHROLOGY  IP CONSULT TO CARDIOLOGY     Social Determinants affecting Dx or Tx: None    Records Reviewed (source and summary of external notes): Nursing notes    FINAL IMPRESSION     1. Cholecystitis          DISPOSITION/PLAN   Admitted    Admit Note: Pt is being admitted by Carlitos Mcginnis. The results of their tests and reason(s) for their admission have been discussed with pt and/or available family. They convey agreement and understanding for the need to be admitted and for the admission diagnosis. PATIENT REFERRED TO:  Follow-up Information    None           DISCHARGE MEDICATIONS:  Current Discharge Medication List            DISCONTINUED MEDICATIONS:  Current Discharge Medication List          I am the Primary Clinician of Record: Azeb Bowers DO (electronically signed)    (Please note that parts of this dictation were completed with voice recognition software. Quite often unanticipated grammatical, syntax, homophones, and other interpretive errors are inadvertently transcribed by the computer software. Please disregards these errors. Please excuse any errors that have escaped final proofreading.)

## 2023-03-07 NOTE — ED NOTES
Called [address] [place] [address] and notified Joon of report being tubed.  Tubed report to [address] [place] [address].

## 2023-03-07 NOTE — ED NOTES
Called [address] [place] [address] and notified Joon of report being tubed.  Tubed report to [address] [place] [address].

## 2023-03-07 NOTE — CONSULTS
[facility] Consultation for: abdominal pain, cholelithiasis    Requesting Physician: Dr. Juan A Andrew    History of Present Illness: Yoan Hilario is a 70 y.o. female who has multiple medical problems including CHF and ESRD, who presents with a 1 week history of vague abdominal pain. The pain has been in the bilateral upper quadrants. Pain is currently somewhat better. She denies nausea or vomiting. She has had some loose stools. She denies constipation. She denies blood in the stool. She denies fever or chills. She has had cholecystitis in the past, which was treated nonoperatively due to her high risk for surgery.     Past Medical History:   Diagnosis Date    Blind     CKD (chronic kidney disease)     HTN (hypertension)     Retinopathy     Type II or unspecified type diabetes mellitus with renal manifestations, uncontrolled(250.42) ([facility])        Past Surgical History:   Procedure Laterality Date    HX CORONARY ARTERY BYPASS GRAFT      HX GYN      hysterectomy        Social History     Socioeconomic History    Marital status:      Spouse name: Not on file    Number of children: Not on file    Years of education: Not on file    Highest education level: Not on file   Occupational History    Not on file   Tobacco Use    Smoking status: Never    Smokeless tobacco: Never   Substance and Sexual Activity    Alcohol use: No    Drug use: Never    Sexual activity: Not on file   Other Topics Concern    Not on file   Social History Narrative    Not on file     Social Determinants of Health     Financial Resource Strain: Not on file   Food Insecurity: Not on file   Transportation Needs: Not on file   Physical Activity: Not on file   Stress: Not on file   Social Connections: Not on file   Intimate Partner Violence: Not on file   Housing Stability: Not on file       Family History   Problem Relation Age of Onset    Diabetes Maternal Aunt     Diabetes Maternal Grandmother          Current Facility-Administered Medications:     HYDROmorphone (DILAUDID) syringe 0.2 mg, 0.2 mg, IntraVENous, Q4H PRN, Margaret Arnold MD    sevelamer carbonate (RENVELA) tab 2,400 mg, 2,400 mg, Oral, TID WITH MEALS, Margaret Arnold MD    traMADoL (ULTRAM) tablet 50 mg, 50 mg, Oral, Q12H PRN, Margaret Arnold MD    isosorbide mononitrate ER (IMDUR) tablet 60 mg, 60 mg, Oral, DAILY, Michael Arnold MD    ondansetron (ZOFRAN) 4 mg/2 mL injection, , , ,     piperacillin-tazobactam (ZOSYN) 3.375 g in 0.9% sodium chloride (MBP/ADV) 100 mL MBP, 3.375 g, IntraVENous, Q12H, Margaret Arnold MD    carvediloL (COREG) tablet 25 mg, 25 mg, Oral, BID WITH MEALS, Margaret Arnold MD    pantoprazole (PROTONIX) tablet 40 mg, 40 mg, Oral, ACB, Margaret Arnold MD    hydrALAZINE (APRESOLINE) tablet 25 mg, 25 mg, Oral, TID, Alexa Vaz MD    sacubitriL-valsartan (ENTRESTO) 49-51 mg tablet 1 Tablet, 1 Tablet, Oral, BID, Margaret Arnold MD    furosemide (LASIX) tablet 20 mg, 20 mg, Oral, DAILY, Michael Arnold MD    diclofenac (VOLTAREN) 1 % topical gel 2 g, 2 g, Topical, Q6H PRN, Margaret Arnold MD    cholecalciferol (VITAMIN D3) (1000 Units /25 mcg) tablet 2,000 Units, 2,000 Units, Oral, DAILY, Michael Arnold MD    sodium chloride (NS) flush 5-40 mL, 5-40 mL, IntraVENous, Q8H, Margaret Arnold MD, 10 mL at 03/07/23 0506    sodium chloride (NS) flush 5-40 mL, 5-40 mL, IntraVENous, PRN, Margaret Arnold MD    acetaminophen (TYLENOL) tablet 650 mg, 650 mg, Oral, Q6H PRN **OR** acetaminophen (TYLENOL) suppository 650 mg, 650 mg, Rectal, Q6H PRN, Alexa Vaz MD    polyethylene glycol (MIRALAX) packet 17 g, 17 g, Oral, DAILY PRN, Margaret Arnold MD    ondansetron (ZOFRAN ODT) tablet 4 mg, 4 mg, Oral, Q8H PRN **OR** ondansetron (ZOFRAN) injection 4 mg, 4 mg, IntraVENous, Q6H PRN, Alexa Vaz MD, 4 mg at 03/07/23 4439    guaiFENesin (ROBITUSSIN) 100 mg/5 mL oral liquid 100 mg, 100 mg, Oral, Q4H PRN, Margaret Arnold MD    No Known Allergies    ROS Constitutional: negative  Ears, Nose, Mouth, Throat, and Face: negative  Respiratory: negative  Cardiovascular: negative  Gastrointestinal:  as above  Genitourinary:negative  Integument/Breast: negative  Hematologic/Lymphatic: negative  Behavioral/Psychiatric: negative  Allergic/Immunologic: negative      Physical Exam:     Visit Vitals  BP (!) 185/90   Pulse 93   Temp 98.5 °F (36.9 °C)   Resp 24   Ht 5' 6\" (1.676 m)   Wt 140 lb (63.5 kg)   SpO2 98%   Breastfeeding No   BMI 22.60 kg/m²       General - alert and oriented, no apparent distress, well developed  HEENT - NC/AT, no scleral icterus  Pulm - CTAB, normal inspiratory effort  CV - RRR, no M/R/G  Abd - soft, NT, negative augustin sign  Ext - warm, well perfused, no edema  Skin - supple and no rashes  Psychiatric - normal affect, good mood    Labs  Recent Results (from the past 24 hour(s))   EKG, 12 LEAD, INITIAL    Collection Time: 03/06/23  4:04 PM   Result Value Ref Range    Ventricular Rate 93 BPM    Atrial Rate 93 BPM    P-R Interval 170 ms    QRS Duration 88 ms    Q-T Interval 378 ms    QTC Calculation (Bezet) 469 ms    Calculated P Axis 73 degrees    Calculated R Axis 106 degrees    Calculated T Axis 65 degrees    Diagnosis       Normal sinus rhythm  Rightward axis  Borderline ECG    Confirmed by Charo Parisi MD, Octavia Lane ([id number]) on 3/6/2023 5:01:57 PM     CBC WITH AUTOMATED DIFF    Collection Time: 03/06/23  4:37 PM   Result Value Ref Range    WBC 9.1 3.6 - 11.0 K/uL    RBC 3.68 (L) 3.80 - 5.20 M/uL    HGB 11.7 11.5 - 16.0 g/dL    HCT 36.1 35.0 - 47.0 %    MCV 98.1 80.0 - 99.0 FL    MCH 31.8 26.0 - 34.0 PG    MCHC 32.4 30.0 - 36.5 g/dL    RDW 16.8 (H) 11.5 - 14.5 %    PLATELET 030 (L) 077 - 400 K/uL    NRBC 0.0 0.0  WBC    ABSOLUTE NRBC 0.00 0.00 - 0.01 K/uL    NEUTROPHILS 75 32 - 75 %    LYMPHOCYTES 9 (L) 12 - 49 %    MONOCYTES 6 5 - 13 %    EOSINOPHILS 9 (H) 0 - 7 %    BASOPHILS 1 0 - 1 %    IMMATURE GRANULOCYTES 0 0 - 0.5 %    ABS.  NEUTROPHILS 6.9 1.8 - 8.0 K/UL    ABS. LYMPHOCYTES 0.8 0.8 - 3.5 K/UL    ABS. MONOCYTES 0.5 0.0 - 1.0 K/UL    ABS. EOSINOPHILS 0.8 (H) 0.0 - 0.4 K/UL    ABS. BASOPHILS 0.1 0.0 - 0.1 K/UL    ABS. IMM. GRANS. 0.0 0.00 - 0.04 K/UL    DF AUTOMATED     LIPASE    Collection Time: 03/06/23  5:55 PM   Result Value Ref Range    Lipase 92 73 - 639 U/L   METABOLIC PANEL, COMPREHENSIVE    Collection Time: 03/06/23  5:55 PM   Result Value Ref Range    Sodium 132 (L) 136 - 145 mmol/L    Potassium 6.3 (H) 3.5 - 5.1 mmol/L    Chloride 101 97 - 108 mmol/L    CO2 19 (L) 21 - 32 mmol/L    Anion gap 12 5 - 15 mmol/L    Glucose 175 (H) 65 - 100 mg/dL    BUN 84 (H) 6 - 20 mg/dL    Creatinine 10.80 (H) 0.55 - 1.02 mg/dL    BUN/Creatinine ratio 8 (L) 12 - 20      eGFR 4 (L) >60 ml/min/1.73m2    Calcium 10.1 8.5 - 10.1 mg/dL    Bilirubin, total 0.9 0.2 - 1.0 mg/dL    AST (SGOT) 22 15 - 37 U/L    ALT (SGPT) 24 12 - 78 U/L    Alk. phosphatase 144 (H) 45 - 117 U/L    Protein, total 7.6 6.4 - 8.2 g/dL    Albumin 3.6 3.5 - 5.0 g/dL    Globulin 4.0 2.0 - 4.0 g/dL    A-G Ratio 0.9 (L) 1.1 - 2.2     COVID-19 WITH INFLUENZA A/B    Collection Time: 03/06/23 11:44 PM   Result Value Ref Range    SARS-CoV-2 by PCR Not Detected Not Detected      Influenza A by PCR Not Detected Not Detected      Influenza B by PCR Not Detected Not Detected     MRSA SCREEN - PCR (NASAL)    Collection Time: 03/07/23  3:40 AM   Result Value Ref Range    MRSA by PCR, Nasal Not Detected Not Detected     GLUCOSE, POC    Collection Time: 03/07/23  7:39 AM   Result Value Ref Range    Glucose (POC) 176 (H) 65 - 100 mg/dL    Performed by [facility]            Imaging  CT A/P:  FINDINGS:     Lack of IV contrast limits evaluation of the solid abdominal organs. LIVER: No mass or biliary dilatation. GALLBLADDER: Moderately distended with moderate sludge and moderate size  gallstone. SPLEEN: Unremarkable  PANCREAS: No mass or ductal dilatation. ADRENALS: Unremarkable.   KIDNEYS/URETERS: Atrophic bilateral kidneys with cortical scarring. Small  nonspecific low-density right renal lesion. No hydronephrosis   PERITONEUM: No abdominal lymphadenopathy or ascites. COLON: No dilatation or wall thickening. APPENDIX: Not visualized  SMALL BOWEL: No dilatation or wall thickening. STOMACH: Unremarkable. PELVIS: Small amount of pelvic free fluid. No pelvic lymphadenopathy  BONES: Moderate L5-S1 degenerative disc disease   VISUALIZED THORAX: Bibasilar atelectasis with mild right and mild to moderate  left pleural effusions  ADDITIONAL COMMENTS: Diffuse subcutaneous edema     IMPRESSION     1. Moderately distended gallbladder with sludge and calcified stone is again  seen. Consider abdominal ultrasound for further evaluation. 2. Bibasilar atelectasis with mild right and mild to moderate left pleural  effusions. 3. Please refer to above findings for complete details. US Abd:  FINDINGS:  LIVER:   The liver is normal in echotexture with no mass or other focal abnormality. The  portal vein flow is hepatopedal.     GALLBLADDER:  The gallbladder is distended with multiple gallstones as well as sludge. There  is mild wall thickening at 10.1 mm. A small amount of ascites is present. COMMON BILE DUCT:  There is no intrahepatic biliary duct dilatation but the common duct measures  6.9 mm in diameter. PANCREAS:  The pancreatic head is normal.     KIDNEYS:  The right kidney demonstrates increased echogenicity with no mass, stone or  hydronephrosis. The right kidney measures 9 cm in length. The right kidney  contains multiple anechoic cysts, the largest of which measures 1.0 x 1.0 x 1.1  cm. The body and tail of the pancreas, left kidney, spleen and retroperitoneum were  not evaluated on this right upper quadrant examination. IMPRESSION     Acute calculus cholecystitis with dilated CBD.   Small amount of ascites  Medical renal disease    I have personally reviewed all of the pertinent images     Assessment: Ady Perez is a 70 y.o. female with multiple medical problems, including CHF and ESRD, presenting with abdominal pain. Imaging findings were concerning for cholelithiasis and possible cholecystitis. Her exam is not entirely consistent with cholecystitis at this time. The imaging findings may represent cholecystitis, or may be artifact due to CHF and hepatic congestion. Recommendations:     1. I recommend further evaluation with a HIDA scan. The patient is high risk for surgery. Even if the HIDA scan is positive, she may benefit from cholecystostomy as opposed to cholecystectomy. Agree with cardiology consult for risk assessment. We will continue to follow. 60 mins of time was spent with the patient of which > 50% of the time involved face-to-face counseling of the patient regarding the proposed treatment plan.     Hemanth Piper MD

## 2023-03-07 NOTE — PROGRESS NOTES
Problem: Falls - Risk of  Goal: *Absence of Falls  Description: Document Meir Huff Fall Risk and appropriate interventions in the flowsheet. Outcome: Progressing Towards Goal  Note: Fall Risk Interventions:                                Problem: Pressure Injury - Risk of  Goal: *Prevention of pressure injury  Description: Document Storm Scale and appropriate interventions in the flowsheet.   Outcome: Progressing Towards Goal  Note: Pressure Injury Interventions:  Sensory Interventions: Monitor skin under medical devices, Keep linens dry and wrinkle-free         Activity Interventions: Increase time out of bed, PT/OT evaluation    Mobility Interventions: Float heels, HOB 30 degrees or less, PT/OT evaluation    Nutrition Interventions: Document food/fluid/supplement intake    Friction and Shear Interventions: Transferring/repositioning devices                Problem: Pain  Goal: *Control of Pain  Outcome: Progressing Towards Goal     Problem: Discharge Planning  Goal: *Discharge to safe environment  Outcome: Progressing Towards Goal

## 2023-03-07 NOTE — PROGRESS NOTES
Problem: Falls - Risk of  Goal: *Absence of Falls  Description: Document Raul Ulrich Fall Risk and appropriate interventions in the flowsheet. Outcome: Progressing Towards Goal  Note: Fall Risk Interventions:                                Problem: Pressure Injury - Risk of  Goal: *Prevention of pressure injury  Description: Document Storm Scale and appropriate interventions in the flowsheet.   Outcome: Progressing Towards Goal  Note: Pressure Injury Interventions:  Sensory Interventions: Monitor skin under medical devices, Keep linens dry and wrinkle-free         Activity Interventions: Increase time out of bed, PT/OT evaluation    Mobility Interventions: Float heels, HOB 30 degrees or less, PT/OT evaluation    Nutrition Interventions: Document food/fluid/supplement intake    Friction and Shear Interventions: Transferring/repositioning devices                Problem: Pain  Goal: *Control of Pain  Outcome: Progressing Towards Goal     Problem: Discharge Planning  Goal: *Discharge to safe environment  Outcome: Progressing Towards Goal

## 2023-03-07 NOTE — ED PROVIDER NOTES
[facility] EMERGENCY DEPT  EMERGENCY DEPARTMENT HISTORY AND PHYSICAL EXAM      Date: 3/6/2023  Patient Name: Scott De Jesus  MRN: [id number]  [place]: 1955  Date of evaluation: 3/6/2023  Provider: Meryl Hudson DO   Note Started: 12:31 AM 3/7/23    HISTORY OF PRESENT ILLNESS     Chief Complaint   Patient presents with    Abdominal Pain       History Provided By: Patient    HPI: Scott De Jesus is a 70 y.o. female presenting to the emergency department for evaluation of abdominal pain. Reports that abdominal pain has been ongoing for the last couple of weeks. Denies fevers, chills. No reported change in bowel habits. Some associated nausea with vomiting. PAST MEDICAL HISTORY   Past Medical History:  Past Medical History:   Diagnosis Date    Blind     CKD (chronic kidney disease)     HTN (hypertension)     Retinopathy     Type II or unspecified type diabetes mellitus with renal manifestations, uncontrolled(250.42) (HCC)        Past Surgical History:  Past Surgical History:   Procedure Laterality Date    HX CORONARY ARTERY BYPASS GRAFT      HX GYN      hysterectomy        Family History:  Family History   Problem Relation Age of Onset    Diabetes Maternal Aunt     Diabetes Maternal Grandmother        Social History:  Social History     Tobacco Use    Smoking status: Never    Smokeless tobacco: Never   Substance Use Topics    Alcohol use: No    Drug use: Never       Allergies:  No Known Allergies    PCP: Danielle Perez MD    Current Meds:   Previous Medications    ACETAMINOPHEN (TYLENOL) 500 MG TABLET    Take  by mouth every six (6) hours as needed for Pain. AMLODIPINE (NORVASC) 10 MG TABLET    Take 10 mg by mouth daily. CARVEDILOL (COREG) 25 MG TABLET    Take 25 mg by mouth two (2) times daily (with meals). CHOLECALCIFEROL (VITAMIN D3) (1000 UNITS /25 MCG) TABLET    Take 2,000 Units by mouth daily. CLOPIDOGREL (PLAVIX) 75 MG TAB    Take 75 mg by mouth daily.     DICLOFENAC (VOLTAREN) 1 % GEL    apply to foot and hands as needed for arthritis pain    DOCUSATE SODIUM (COLACE) 100 MG CAPSULE    Take 100 mg by mouth as needed for Constipation. FERROUS SULFATE 325 MG (65 MG IRON) TABLET    Take 325 mg by mouth as needed. FUROSEMIDE (LASIX) 20 MG TABLET    Take 20 mg by mouth daily. HYDRALAZINE (APRESOLINE) 25 MG TABLET    Take 25 mg by mouth three (3) times daily. INSULIN GLARGINE (LANTUS SOLOSTAR U-100 INSULIN) 100 UNIT/ML (3 ML) INPN    Inject 8 units daily. ISOSORBIDE DINITRATE (ISORDIL) 10 MG TABLET    Take 1 Tablet by mouth three (3) times daily. PANTOPRAZOLE (PROTONIX) 40 MG TABLET        ROSUVASTATIN (CRESTOR) 20 MG TABLET    TAKE 1 TABLET NIGHTLY (STOP 40 MG DOSE)    SACUBITRIL-VALSARTAN (ENTRESTO) 49-51 MG TAB TABLET    Take 1 Tab by mouth two (2) times a day. SEVELAMER CARBONATE (RENVELA) 800 MG TAB TAB    Take  by mouth three (3) times daily. REVIEW OF SYSTEMS   Review of Systems   Constitutional:  Negative for chills and fever. HENT:  Negative for congestion and sore throat. Eyes:  Negative for pain and visual disturbance. Respiratory:  Negative for cough and shortness of breath. Cardiovascular:  Negative for chest pain and palpitations. Gastrointestinal:  Positive for abdominal pain, nausea and vomiting. Negative for constipation and diarrhea. Genitourinary:  Negative for dysuria and frequency. Musculoskeletal:  Negative for arthralgias and myalgias. Skin:  Negative for color change and rash. Neurological:  Negative for dizziness, weakness, light-headedness and headaches. Psychiatric/Behavioral:  Negative for dysphoric mood and sleep disturbance. Positives and Pertinent negatives as per HPI.     PHYSICAL EXAM     ED Triage Vitals [03/06/23 1601]   ED Encounter Vitals Group      BP (!) 195/101      Pulse (Heart Rate) 92      Resp Rate 17      Temp 98.2 °F (36.8 °C)      Temp src       O2 Sat (%) 94 %      Weight 166 lb      Height 5' 6\"      Physical Exam  Constitutional:       Appearance: Normal appearance. HENT:      Head: Normocephalic and atraumatic. Right Ear: External ear normal.      Left Ear: External ear normal.      Nose: Nose normal.      Mouth/Throat:      Mouth: Mucous membranes are moist.   Eyes:      Extraocular Movements: Extraocular movements intact. Conjunctiva/sclera: Conjunctivae normal.   Cardiovascular:      Rate and Rhythm: Normal rate and regular rhythm. Pulses: Normal pulses. Heart sounds: Normal heart sounds. Pulmonary:      Effort: Pulmonary effort is normal.      Breath sounds: Normal breath sounds. Abdominal:      General: Abdomen is flat. There is no distension. Palpations: Abdomen is soft. Tenderness: There is abdominal tenderness in the right upper quadrant and epigastric area. Musculoskeletal:         General: Normal range of motion. Cervical back: Normal range of motion. Skin:     General: Skin is warm and dry. Capillary Refill: Capillary refill takes less than 2 seconds. Neurological:      General: No focal deficit present. Mental Status: She is alert and oriented to person, place, and time. Mental status is at baseline.    Psychiatric:         Mood and Affect: Mood normal.         Behavior: Behavior normal.       SCREENINGS        No data recorded    LAB, EKG AND DIAGNOSTIC RESULTS   Labs:  Recent Results (from the past 12 hour(s))   EKG, 12 LEAD, INITIAL    Collection Time: 03/06/23  4:04 PM   Result Value Ref Range    Ventricular Rate 93 BPM    Atrial Rate 93 BPM    P-R Interval 170 ms    QRS Duration 88 ms    Q-T Interval 378 ms    QTC Calculation (Bezet) 469 ms    Calculated P Axis 73 degrees    Calculated R Axis 106 degrees    Calculated T Axis 65 degrees    Diagnosis       Normal sinus rhythm  Rightward axis  Borderline ECG    Confirmed by Sameer Byrd MD, Sara Kruse ([id number]) on 3/6/2023 5:01:57 PM     CBC WITH AUTOMATED DIFF    Collection Time: 03/06/23  4:37 PM   Result Value Ref Range    WBC 9.1 3.6 - 11.0 K/uL    RBC 3.68 (L) 3.80 - 5.20 M/uL    HGB 11.7 11.5 - 16.0 g/dL    HCT 36.1 35.0 - 47.0 %    MCV 98.1 80.0 - 99.0 FL    MCH 31.8 26.0 - 34.0 PG    MCHC 32.4 30.0 - 36.5 g/dL    RDW 16.8 (H) 11.5 - 14.5 %    PLATELET 367 (L) 727 - 400 K/uL    NRBC 0.0 0.0  WBC    ABSOLUTE NRBC 0.00 0.00 - 0.01 K/uL    NEUTROPHILS 75 32 - 75 %    LYMPHOCYTES 9 (L) 12 - 49 %    MONOCYTES 6 5 - 13 %    EOSINOPHILS 9 (H) 0 - 7 %    BASOPHILS 1 0 - 1 %    IMMATURE GRANULOCYTES 0 0 - 0.5 %    ABS. NEUTROPHILS 6.9 1.8 - 8.0 K/UL    ABS. LYMPHOCYTES 0.8 0.8 - 3.5 K/UL    ABS. MONOCYTES 0.5 0.0 - 1.0 K/UL    ABS. EOSINOPHILS 0.8 (H) 0.0 - 0.4 K/UL    ABS. BASOPHILS 0.1 0.0 - 0.1 K/UL    ABS. IMM. GRANS. 0.0 0.00 - 0.04 K/UL    DF AUTOMATED     LIPASE    Collection Time: 03/06/23  5:55 PM   Result Value Ref Range    Lipase 92 73 - 024 U/L   METABOLIC PANEL, COMPREHENSIVE    Collection Time: 03/06/23  5:55 PM   Result Value Ref Range    Sodium 132 (L) 136 - 145 mmol/L    Potassium 6.3 (H) 3.5 - 5.1 mmol/L    Chloride 101 97 - 108 mmol/L    CO2 19 (L) 21 - 32 mmol/L    Anion gap 12 5 - 15 mmol/L    Glucose 175 (H) 65 - 100 mg/dL    BUN 84 (H) 6 - 20 mg/dL    Creatinine 10.80 (H) 0.55 - 1.02 mg/dL    BUN/Creatinine ratio 8 (L) 12 - 20      eGFR 4 (L) >60 ml/min/1.73m2    Calcium 10.1 8.5 - 10.1 mg/dL    Bilirubin, total 0.9 0.2 - 1.0 mg/dL    AST (SGOT) 22 15 - 37 U/L    ALT (SGPT) 24 12 - 78 U/L    Alk.  phosphatase 144 (H) 45 - 117 U/L    Protein, total 7.6 6.4 - 8.2 g/dL    Albumin 3.6 3.5 - 5.0 g/dL    Globulin 4.0 2.0 - 4.0 g/dL    A-G Ratio 0.9 (L) 1.1 - 2.2         Radiologic Studies:  Non-plain film images such as CT, Ultrasound and MRI are read by the radiologist. Plain radiographic images are visualized and preliminarily interpreted by the ED Physician with the following findings: Not applicable    Interpretation per the Radiologist below, if available at the time of this note:  CT ABD PELV WO CONT    Result Date: 3/6/2023  INDICATION: Abdominal pain COMPARISON: 7/28/2022 TECHNIQUE: Thin axial images were obtained through the abdomen and pelvis without intravenous iodinated contrast administration. Coronal and sagittal reconstructions were generated. Oral contrast was not administered. CT dose reduction was achieved through use of a standardized protocol tailored for this examination and automatic exposure control for dose modulation. FINDINGS: Lack of IV contrast limits evaluation of the solid abdominal organs. LIVER: No mass or biliary dilatation. GALLBLADDER: Moderately distended with moderate sludge and moderate size gallstone. SPLEEN: Unremarkable PANCREAS: No mass or ductal dilatation. ADRENALS: Unremarkable. KIDNEYS/URETERS: Atrophic bilateral kidneys with cortical scarring. Small nonspecific low-density right renal lesion. No hydronephrosis PERITONEUM: No abdominal lymphadenopathy or ascites. COLON: No dilatation or wall thickening. APPENDIX: Not visualized SMALL BOWEL: No dilatation or wall thickening. STOMACH: Unremarkable. PELVIS: Small amount of pelvic free fluid. No pelvic lymphadenopathy BONES: Moderate L5-S1 degenerative disc disease VISUALIZED THORAX: Bibasilar atelectasis with mild right and mild to moderate left pleural effusions ADDITIONAL COMMENTS: Diffuse subcutaneous edema     1. Moderately distended gallbladder with sludge and calcified stone is again seen. Consider abdominal ultrasound for further evaluation. 2. Bibasilar atelectasis with mild right and mild to moderate left pleural effusions. 3. Please refer to above findings for complete details. XR CHEST PORT    Result Date: 3/6/2023  EXAM:  XR CHEST PORT INDICATION: Cough and shortness of breath COMPARISON: 8/27/2022 TECHNIQUE: Portable AP semiupright chest view at 2200 hours FINDINGS: The left chest ICD and wires are stable. The cardiomediastinal contours are stable.  There are mild diffuse interstitial opacities and small left pleural effusion, similar to 8/27/2022. There is no pneumothorax. The bones and upper abdomen are stable. Mild pulmonary edema and small left pleural effusion, similar to 8/27/2022. US RUQ    Result Date: 3/6/2023  EXAM:  ABDOMEN, LTD - US* INDICATION: Distended gallbladder. COMPARISON: 2013. TECHNIQUE: Limited real-time sonography of the right upper quadrant of the abdomen was performed with multiple static images of the liver, gallbladder, pancreatic head and right kidney obtained. FINDINGS: LIVER: The liver is normal in echotexture with no mass or other focal abnormality. The portal vein flow is hepatopedal. GALLBLADDER: The gallbladder is distended with multiple gallstones as well as sludge. There is mild wall thickening at 10.1 mm. A small amount of ascites is present. COMMON BILE DUCT: There is no intrahepatic biliary duct dilatation but the common duct measures 6.9 mm in diameter. PANCREAS: The pancreatic head is normal. KIDNEYS: The right kidney demonstrates increased echogenicity with no mass, stone or hydronephrosis. The right kidney measures 9 cm in length. The right kidney contains multiple anechoic cysts, the largest of which measures 1.0 x 1.0 x 1.1 cm. The body and tail of the pancreas, left kidney, spleen and retroperitoneum were not evaluated on this right upper quadrant examination. Acute calculus cholecystitis with dilated CBD. Small amount of ascites Medical renal disease     PROCEDURES   Unless otherwise noted below, none. Procedures    CRITICAL CARE TIME   None  ED COURSE and DIFFERENTIAL DIAGNOSIS/MDM   CC/HPI Summary, DDx, ED Course, and Reassessment: 39-year-old female presenting to the emergency department for evaluation of abdominal pain. Reports that symptoms have been ongoing for the last couple of weeks, however have worsened over the course of the last couple of days. Reports new onset of nausea and vomiting. Laboratory evaluation without leukocytosis.   Chemistry consistent with ESRD on dialysis. CT abdomen without contrast demonstrating distended gallbladder. Right upper quadrant ultrasound showing evidence of acute cholecystitis. Will initiate IV antibiotics and admit to medicine.   Case discussed with general surgeon on-call as well as hospitalist.    Disposition Considerations (Tests not done, Shared Decision Making, Pt Expectation of Test or Treatment.):      Vitals:    Vitals:    03/06/23 1601 03/06/23 1605   BP: (!) 195/101 (!) 195/101   Pulse: 92 94   Resp: 17 18   Temp: 98.2 °F (36.8 °C) 98.2 °F (36.8 °C)   SpO2: 94% 98%   Weight: 75.3 kg (166 lb) 63.5 kg (140 lb)   Height: 5' 6\" (1.676 m) 5' 6\" (1.676 m)             Patient was given the following medications:  Medications   piperacillin-tazobactam (ZOSYN) 4.5 g in 0.9% sodium chloride (MBP/ADV) 100 mL MBP (4.5 g IntraVENous New Bag 3/6/23 2204)   piperacillin-tazobactam (ZOSYN) 3.375 g in 0.9% sodium chloride (MBP/ADV) 100 mL MBP (has no administration in time range)   carvediloL (COREG) tablet 25 mg (has no administration in time range)   pantoprazole (PROTONIX) tablet 40 mg (has no administration in time range)   hydrALAZINE (APRESOLINE) tablet 25 mg (has no administration in time range)   sacubitriL-valsartan (ENTRESTO) 49-51 mg tablet 1 Tablet (has no administration in time range)   amLODIPine (NORVASC) tablet 10 mg (has no administration in time range)   furosemide (LASIX) tablet 20 mg (has no administration in time range)   diclofenac (VOLTAREN) 1 % topical gel 2 g (has no administration in time range)   cholecalciferol (VITAMIN D3) (1000 Units /25 mcg) tablet 2,000 Units (has no administration in time range)   isosorbide dinitrate (ISORDIL) tablet 10 mg (has no administration in time range)   sevelamer carbonate (RENVELA) tab 800 mg (has no administration in time range)   rosuvastatin (CRESTOR) tablet 20 mg (has no administration in time range)   sodium chloride (NS) flush 5-40 mL (10 mL IntraVENous Given 3/6/23 2206)   sodium chloride (NS) flush 5-40 mL (has no administration in time range)   acetaminophen (TYLENOL) tablet 650 mg (has no administration in time range)     Or   acetaminophen (TYLENOL) suppository 650 mg (has no administration in time range)   polyethylene glycol (MIRALAX) packet 17 g (has no administration in time range)   ondansetron (ZOFRAN ODT) tablet 4 mg (has no administration in time range)     Or   ondansetron (ZOFRAN) injection 4 mg (has no administration in time range)   guaiFENesin (ROBITUSSIN) 100 mg/5 mL oral liquid 100 mg (has no administration in time range)   sodium bicarbonate 8.4 % (1 mEq/mL) injection 50 mEq (50 mEq IntraVENous Given 3/6/23 2205)   guaiFENesin (ROBITUSSIN) 100 mg/5 mL oral liquid 100 mg (100 mg Oral Given 3/6/23 2204)       CONSULTS: (Who and What was discussed)  IP CONSULT TO HOSPITALIST  IP CONSULT TO GENERAL SURGERY  IP CONSULT TO NEPHROLOGY  IP CONSULT TO CARDIOLOGY     Social Determinants affecting Dx or Tx: None    Records Reviewed (source and summary of external notes): Nursing notes    FINAL IMPRESSION     1. Cholecystitis          DISPOSITION/PLAN   Admitted    Admit Note: Pt is being admitted by [facility]. The results of their tests and reason(s) for their admission have been discussed with pt and/or available family. They convey agreement and understanding for the need to be admitted and for the admission diagnosis. PATIENT REFERRED TO:  Follow-up Information    None           DISCHARGE MEDICATIONS:  Current Discharge Medication List            DISCONTINUED MEDICATIONS:  Current Discharge Medication List          I am the Primary Clinician of Record: Elliot Crawford DO (electronically signed)    (Please note that parts of this dictation were completed with voice recognition software. Quite often unanticipated grammatical, syntax, homophones, and other interpretive errors are inadvertently transcribed by the computer software. Please disregards these errors. Please excuse any errors that have escaped final proofreading.)

## 2023-03-07 NOTE — CONSULTS
CARDIOLOGY CONSULTATION      REASON FOR CONSULT: CHF, CAD, pre-op    REQUESTING PROVIDER: Hospital medicine    CHIEF COMPLAINT:  Abdominal pain    HISTORY OF PRESENT ILLNESS:  Drake Covington is a 70y.o. year-old female with past medical history significant for CAD s/p CABG in 2013, HFrEF EF 20%, ESRD on HD, HTN, DM, p/w abdominal pain, nausea and vomiting. Abdominal US shows acute calculus cholecystitis. She went for a HIDA scan today. Surgery has seen patient has will make a decision of drain, vs surgery vs medical management after HIDA scan. Patient has no active chest pain, but did describe some vague chest discomfort symptoms at home and GEORGE. It appears she is not too active at home, is legally blind and walks with cane. Appears relatively euvolemic on exam.     INPATIENT MEDICATIONS:  Home medications reviewed.     Current Facility-Administered Medications:     HYDROmorphone (DILAUDID) syringe 0.2 mg, 0.2 mg, IntraVENous, Q4H PRN, Pasquale Arnold MD    sevelamer carbonate (RENVELA) tab 2,400 mg, 2,400 mg, Oral, TID WITH MEALS, Pasquale Arnold MD    traMADoL (ULTRAM) tablet 50 mg, 50 mg, Oral, Q12H PRN, Pasquale Arnold MD    isosorbide mononitrate ER (IMDUR) tablet 60 mg, 60 mg, Oral, DAILY, Pasquale Arnold MD, 60 mg at 03/07/23 1449    ondansetron (ZOFRAN) 4 mg/2 mL injection, , , ,     hydrALAZINE (APRESOLINE) 20 mg/mL injection 10 mg, 10 mg, IntraVENous, Q6H PRN, Herve Moulton NP    hydrALAZINE (APRESOLINE) tablet 50 mg, 50 mg, Oral, TID, Dorota Winter NP    piperacillin-tazobactam (ZOSYN) 3.375 g in 0.9% sodium chloride (MBP/ADV) 100 mL MBP, 3.375 g, IntraVENous, Q12H, Michael Arnold MD    carvediloL (COREG) tablet 25 mg, 25 mg, Oral, BID WITH MEALS, Pasquale Arnold MD, 25 mg at 03/07/23 1449    pantoprazole (PROTONIX) tablet 40 mg, 40 mg, Oral, ACB, Pasquale Arnold MD    sacubitriL-valsartan (ENTRESTO) 49-51 mg tablet 1 Tablet, 1 Tablet, Oral, BID, Pasquale Arnold MD    furosemide (LASIX) tablet 20 mg, 20 mg, Oral, DAILY, John Arnold MD, 20 mg at 03/07/23 1449    diclofenac (VOLTAREN) 1 % topical gel 2 g, 2 g, Topical, Q6H PRN, John Arnold MD    cholecalciferol (VITAMIN D3) (1000 Units /25 mcg) tablet 2,000 Units, 2,000 Units, Oral, DAILY, Michael Arnold MD    sodium chloride (NS) flush 5-40 mL, 5-40 mL, IntraVENous, Q8H, John Arnold MD, 10 mL at 03/07/23 1450    sodium chloride (NS) flush 5-40 mL, 5-40 mL, IntraVENous, PRN, Damian Lopez MD    acetaminophen (TYLENOL) tablet 650 mg, 650 mg, Oral, Q6H PRN **OR** acetaminophen (TYLENOL) suppository 650 mg, 650 mg, Rectal, Q6H PRN, Damian Lopez MD    polyethylene glycol (MIRALAX) packet 17 g, 17 g, Oral, DAILY PRN, John Arnold MD    ondansetron (ZOFRAN ODT) tablet 4 mg, 4 mg, Oral, Q8H PRN **OR** ondansetron (ZOFRAN) injection 4 mg, 4 mg, IntraVENous, Q6H PRN, Damian Lopez MD, 4 mg at 03/07/23 5987    guaiFENesin (ROBITUSSIN) 100 mg/5 mL oral liquid 100 mg, 100 mg, Oral, Q4H PRN, Jhon Arnold MD     ALLERGIES:  Allergies reviewed with the patient,No Known Allergies . FAMILY HISTORY:  Family history reviewed. SOCIAL HISTORY:  Notable for no  tobacco use, no heavy alcohol or illicit drug use. REVIEW OF SYSTEMS:  Complete review of systems performed, pertinents noted above, all other systems are negative. PHYSICAL EXAMINATION: Cardiovascular exam has a heart with a RRR, normal S1 and S2. No murmur present. There are no rubs or gallops. Good peripheral pulses. No jugular venous distension. No carotid bruits are present. Respiratory exam reveals clear lung fields, no rales or rhonchi. Gastrointestinal exam has soft, nontender abdomen with normal bowel sounds. Lymphatic exam reveals no edema and no varicosities. No notable skin changes.     Visit Vitals  BP (!) 178/86   Pulse 97   Temp 98.9 °F (37.2 °C)   Resp 18   Ht 5' 6\" (1.676 m)   Wt 63.5 kg (140 lb)   SpO2 95%   Breastfeeding No   BMI 22.60 kg/m²         Recent labs results and imaging reviewed. Notable findings include   Lab Results   Component Value Date/Time    WBC 8.9 03/07/2023 07:50 AM    HGB 11.9 03/07/2023 07:50 AM    HCT 34.8 (L) 03/07/2023 07:50 AM    PLATELET 454 74/45/7355 07:50 AM    MCV 95.9 03/07/2023 07:50 AM     No results found for: CPK, RCK1, RCK2, RCK3, RCK4, CKMB, CKNDX, CKND1, TROPT, TROIQ, BNPP, BNP   Lab Results   Component Value Date/Time    NT pro-BNP >35,000 (H) 07/27/2022 03:32 PM    NT pro-BNP >35,000 (H) 03/11/2022 09:16 PM     .     1) Pre-op: Patient is high risk for any intraoperative procedure requiring general anesthesia. She has HF, CAD, ESRD, DM and is frail. Would recommend the least invasive procedure if possible. She has <4 METS. - Recommend continuing her HF medications: Entresto, Coreg, Lasix, hydralazine, IMDUR   2) CAD: S/p CABG  - Would add ASA 81mg   3) HFrEF: C/w her home medications as seen above   4) Acute cholecystitis: See above. Pending decision from surgery after HIDA scan       Thank you for involving us in the care of this patient.     Huy Hargrove MD  3/7/2023

## 2023-03-07 NOTE — H&P
History and Physical    Patient: Kosta Goode MRN: [id number]  SSN: [id number]    YOB: 1955  Age: 70 y.o. Sex: female      Subjective: Kosta Goode is a 70 y.o. female with PMH of ESRD, hypertension, diabetes and other medical problems as below. Poor historian with limited history provided. She presented to the ED with chief complaint of abdominal pain, nausea and vomiting for the past month. Abdominal pain mild-moderate, right-sided abdomen. Associated with nausea and vomiting, denies fever or chills. She also complain of coughing for the past 1 month, associated with some wheezing. No shortness of breath, orthopnea or LE swelling, no history of COPD or smoking. She missed the last hemodialysis session as she was not feeling well, last hemodialysis session was last Friday. Unsure who is her nephrologist.     In the ED, vital signs stable, afebrile. Abdominal ultrasound showed acute calculus cholecystitis. Chest X-ray showed mild pulmonary edema with small left pleural effusion. Chart review: none    Past Medical History:   Diagnosis Date    Blind     CKD (chronic kidney disease)     HTN (hypertension)     Retinopathy     Type II or unspecified type diabetes mellitus with renal manifestations, uncontrolled(250.42) ([facility] [address]      Family History   Problem Relation Age of Onset    Diabetes Maternal Aunt     Diabetes Maternal Grandmother      Social History     Tobacco Use    Smoking status: Never    Smokeless tobacco: Never   Substance Use Topics    Alcohol use: No        Objective:     Physical Exam:   General: alert, cooperative, no distress  Eye: conjunctivae/corneas clear. PERRL, EOM's intact. Throat and Neck: normal and no erythema or exudates noted. No mass   Lung: course breath sounds on auscultation bilaterally, no wheezing. Heart: regular rate and rhythm,   Abdomen: soft, non-tender. Bowel sounds normal. No masses,  Extremities: 1+ LE edema.  able to move all extremities normal, atraumatic  Skin: Normal.  Neurologic: Motor function and sensation grossly intact. Psychiatric: non focal    Most recent lab work and imaging results reviewed in EMR. Assessment and plan:   # Acute cholecystitis  - IV zosyn empirically  - Consult surgery for potential intervention  - Consult cardiology for cardiac risk evaluation  - Clear liquid at midnight, NPO for breakfast.     # Pulmonary edema  - Likely secondary to missed hemodialysis and CHF. - Chest X-ray to evaluate for pneumonia. - Screen for COVID-19 and influenza  - Hemodialysis per nephrology    # Hyperkalemia  - Secondary to ESRD. Hemodialysis in AM.  - 50mEq bicarb x 1.   - Continue to monitor     # ESRD  - Consult nephrology for hemodialysis. Mon-Wed-Fri hemodialysis schedule. - Missed hemodialysis today, surgery recommended hemodialysis prior to surgery. - Continue home medications. PO sevelamer. # CHF  - Mildly fluid overload clinically, continue home dose PO lasix  - Continue home medications. PO entresto, BB. # CAD  - History of cabg in 2013  - Continue statin, BB. Hold plavix for possible surgery. # Essential hypertension   - Continue home medications. # HLP   - Continue home medications. PO crestor. # Social Determents of health: None    # Full code by default, need further clarification    # Medication reconciliation: Medication list reviewed on Epic and/or outside documentation. Not reviewed with patient. However, medication reconciliation incomplete, appreciate assistance from pharmacy or nursing staff.     Signed By: Lew Myles MD     March 6, 2023

## 2023-03-07 NOTE — DIALYSIS
Randi Rodriguez in telemetry notified pt is in dialysis, box #31.   Hepatitis B surface Ag negative, drawn at [address] Renal 3/1/2023

## 2023-03-07 NOTE — PROGRESS NOTES
Bedside, Verbal, and Written shift change report given to Summer Petit LPN   (oncoming nurse) by Saralee Ahumada   (offgoing nurse). Report included the following information SBAR, Intake/Output, MAR, Recent Results, and Quality Measures.

## 2023-03-07 NOTE — CONSULTS
CARDIOLOGY CONSULTATION      REASON FOR CONSULT: CHF, CAD, pre-op    REQUESTING PROVIDER: Hospital medicine    CHIEF COMPLAINT:  Abdominal pain    HISTORY OF PRESENT ILLNESS:  Kosta Goode is a 70y.o. year-old female with past medical history significant for CAD s/p CABG in 2013, HFrEF EF 20%, ESRD on HD, HTN, DM, p/w abdominal pain, nausea and vomiting. Abdominal US shows acute calculus cholecystitis. She went for a HIDA scan today. Surgery has seen patient has will make a decision of drain, vs surgery vs medical management after HIDA scan. Patient has no active chest pain, but did describe some vague chest discomfort symptoms at home and GEORGE. It appears she is not too active at home, is legally blind and walks with cane. Appears relatively euvolemic on exam.     INPATIENT MEDICATIONS:  Home medications reviewed.     Current Facility-Administered Medications:     HYDROmorphone (DILAUDID) syringe 0.2 mg, 0.2 mg, IntraVENous, Q4H PRN, Sydnee Arnold MD    sevelamer carbonate (RENVELA) tab 2,400 mg, 2,400 mg, Oral, TID WITH MEALS, Sydnee Arnold MD    traMADoL (ULTRAM) tablet 50 mg, 50 mg, Oral, Q12H PRN, Sydnee Arnold MD    isosorbide mononitrate ER (IMDUR) tablet 60 mg, 60 mg, Oral, DAILY, Sydnee Arnold MD, 60 mg at 03/07/23 1449    ondansetron (ZOFRAN) 4 mg/2 mL injection, , , ,     hydrALAZINE (APRESOLINE) 20 mg/mL injection 10 mg, 10 mg, IntraVENous, Q6H PRN, Deirdre Rider, SHANI    hydrALAZINE (APRESOLINE) tablet 50 mg, 50 mg, Oral, TID, Dorota Winter NP    piperacillin-tazobactam (ZOSYN) 3.375 g in 0.9% sodium chloride (MBP/ADV) 100 mL MBP, 3.375 g, IntraVENous, Q12H, Michael Arnold MD    carvediloL (COREG) tablet 25 mg, 25 mg, Oral, BID WITH MEALS, Sydnee Arnold MD, 25 mg at 03/07/23 1449    pantoprazole (PROTONIX) tablet 40 mg, 40 mg, Oral, ACB, Sydnee Arnold MD    sacubitriL-valsartan (ENTRESTO) 49-51 mg tablet 1 Tablet, 1 Tablet, Oral, BID, Sydnee Arnold MD    furosemide (LASIX) tablet 20 mg, 20 mg, Oral, DAILY, Dwayne Arnold MD, 20 mg at 03/07/23 1449    diclofenac (VOLTAREN) 1 % topical gel 2 g, 2 g, Topical, Q6H PRN, Dwayne Arnold MD    cholecalciferol (VITAMIN D3) (1000 Units /25 mcg) tablet 2,000 Units, 2,000 Units, Oral, DAILY, Michael Arnold MD    sodium chloride (NS) flush 5-40 mL, 5-40 mL, IntraVENous, Q8H, Dwayne Arnold MD, 10 mL at 03/07/23 1450    sodium chloride (NS) flush 5-40 mL, 5-40 mL, IntraVENous, PRN, Roney Gomez MD    acetaminophen (TYLENOL) tablet 650 mg, 650 mg, Oral, Q6H PRN **OR** acetaminophen (TYLENOL) suppository 650 mg, 650 mg, Rectal, Q6H PRN, Roney Gomez MD    polyethylene glycol (MIRALAX) packet 17 g, 17 g, Oral, DAILY PRN, Dwayne Arnold MD    ondansetron (ZOFRAN ODT) tablet 4 mg, 4 mg, Oral, Q8H PRN **OR** ondansetron (ZOFRAN) injection 4 mg, 4 mg, IntraVENous, Q6H PRN, Roney Gomez MD, 4 mg at 03/07/23 0185    guaiFENesin (ROBITUSSIN) 100 mg/5 mL oral liquid 100 mg, 100 mg, Oral, Q4H PRN, Dwayne Arnold MD     ALLERGIES:  Allergies reviewed with the patient,No Known Allergies . FAMILY HISTORY:  Family history reviewed. SOCIAL HISTORY:  Notable for no  tobacco use, no heavy alcohol or illicit drug use. REVIEW OF SYSTEMS:  Complete review of systems performed, pertinents noted above, all other systems are negative. PHYSICAL EXAMINATION: Cardiovascular exam has a heart with a RRR, normal S1 and S2. No murmur present. There are no rubs or gallops. Good peripheral pulses. No jugular venous distension. No carotid bruits are present. Respiratory exam reveals clear lung fields, no rales or rhonchi. Gastrointestinal exam has soft, nontender abdomen with normal bowel sounds. Lymphatic exam reveals no edema and no varicosities. No notable skin changes.     Visit Vitals  BP (!) 178/86   Pulse 97   Temp 98.9 °F (37.2 °C)   Resp 18   Ht 5' 6\" (1.676 m)   Wt 63.5 kg (140 lb)   SpO2 95%   Breastfeeding No   BMI 22.60 kg/m²         Recent labs results and imaging reviewed. Notable findings include   Lab Results   Component Value Date/Time    WBC 8.9 03/07/2023 07:50 AM    HGB 11.9 03/07/2023 07:50 AM    HCT 34.8 (L) 03/07/2023 07:50 AM    PLATELET 654 15/97/9430 07:50 AM    MCV 95.9 03/07/2023 07:50 AM     No results found for: CPK, RCK1, RCK2, RCK3, RCK4, CKMB, CKNDX, CKND1, TROPT, TROIQ, BNPP, BNP   Lab Results   Component Value Date/Time    NT pro-BNP >35,000 (H) 07/27/2022 03:32 PM    NT pro-BNP >35,000 (H) 03/11/2022 09:16 PM     .     1) Pre-op: Patient is high risk for any intraoperative procedure requiring general anesthesia. She has HF, CAD, ESRD, DM and is frail. Would recommend the least invasive procedure if possible. She has <4 METS. - Recommend continuing her HF medications: Entresto, Coreg, Lasix, hydralazine, IMDUR   2) CAD: S/p CABG  - Would add ASA 81mg   3) HFrEF: C/w her home medications as seen above   4) Acute cholecystitis: See above. Pending decision from surgery after HIDA scan       Thank you for involving us in the care of this patient.     Yen Wang MD  3/7/2023

## 2023-03-07 NOTE — CONSULTS
Renal Consultation Note    NAME:  Regulo Zimmer   :   1955   MRN:   [id number]     ATTENDING: Bobbetta Gowers, MD  PCP:  Alix Washington MD    Date/Time:  3/7/2023 12:11 PM      Subjective:   REQUESTING PHYSICIAN:  REASON FOR CONSULT:    esrd  Patient is a 69-year-old Afro-American female with history of ESRD on hemodialysis every MWF at [address] renal care [facility], diabetes and hypertension who presented to the hospital it seems because of abdominal pain nausea and vomiting for the past 1 month. Her abdominal pain was described as mild to moderate right-sided associated with nausea vomiting. No fever or chills. She had abdominal ultrasound on admission which showed acute calculus cholecystitis. Surgery has been consulted and a HIDA scan has been ordered. Nephrology consultation called because of patient being on dialysis. She was seen in the dialysis unit currently being dialyzed. Goal to remove 3 to 4 L of fluid as her chest x-ray showed mild pulmonary edema with small left pleural effusion. Patient reports her shortness of breath to be better. 3.3 L of already been removed with dialysis. She has a left arm AV fistula is current access    Calcium was 6.2 .   Patient missed her dialysis yesterday as she was feeling unwell    Past Medical History:   Diagnosis Date    Blind     CKD (chronic kidney disease)     HTN (hypertension)     Retinopathy     Type II or unspecified type diabetes mellitus with renal manifestations, uncontrolled(250.42) ([facility])       Past Surgical History:   Procedure Laterality Date    HX CORONARY ARTERY BYPASS GRAFT      HX GYN      hysterectomy      Social History     Tobacco Use    Smoking status: Never    Smokeless tobacco: Never   Substance Use Topics    Alcohol use: No      Family History   Problem Relation Age of Onset    Diabetes Maternal Aunt     Diabetes Maternal Grandmother        No Known Allergies   Prior to Admission medications    Medication Sig Start Date End Date Taking? Authorizing Provider   isosorbide dinitrate (ISORDIL) 10 mg tablet Take 1 Tablet by mouth three (3) times daily. 7/31/22   Linh Bills MD   diclofenac (VOLTAREN) 1 % gel apply to foot and hands as needed for arthritis pain 9/21/21   Provider, Historical   cholecalciferol (VITAMIN D3) (1000 Units /25 mcg) tablet Take 2,000 Units by mouth daily. Provider, Historical   furosemide (LASIX) 20 mg tablet Take 20 mg by mouth daily. Provider, Historical   rosuvastatin (CRESTOR) 20 mg tablet TAKE 1 TABLET NIGHTLY (STOP 40 MG DOSE) 8/7/21   Honey Lunsford MD   amLODIPine (NORVASC) 10 mg tablet Take 10 mg by mouth daily. Provider, Historical   clopidogreL (PLAVIX) 75 mg tab Take 75 mg by mouth daily. Provider, Historical   sacubitriL-valsartan (Entresto) 49-51 mg tab tablet Take 1 Tab by mouth two (2) times a day. Provider, Historical   insulin glargine (Lantus Solostar U-100 Insulin) 100 unit/mL (3 mL) inpn Inject 8 units daily. 8/5/20   Honey Lunsford MD   hydrALAZINE (APRESOLINE) 25 mg tablet Take 25 mg by mouth three (3) times daily. 3/11/19   Provider, Historical   sevelamer carbonate (RENVELA) 800 mg tab tab Take  by mouth three (3) times daily. Provider, Historical   pantoprazole (PROTONIX) 40 mg tablet  7/20/17   Provider, Historical   acetaminophen (TYLENOL) 500 mg tablet Take  by mouth every six (6) hours as needed for Pain. Provider, Historical   docusate sodium (COLACE) 100 mg capsule Take 100 mg by mouth as needed for Constipation. Provider, Historical   carvediloL (COREG) 25 mg tablet Take 25 mg by mouth two (2) times daily (with meals). Provider, Historical   ferrous sulfate 325 mg (65 mg iron) tablet Take 325 mg by mouth as needed. Provider, Historical       REVIEW OF SYSTEMS:       Constitutional: Negative for chills and fever. HENT: Negative. Eyes: Negative. Respiratory: Negative. Cardiovascular: Negative.     Gastrointestinal: Negative for abdominal pain and nausea. Skin: Negative. Neurological: Negative. Objective:   VITALS:    Visit Vitals  BP (!) 198/102   Pulse 99   Temp 97.9 °F (36.6 °C)   Resp 18   Ht 5' 6\" (1.676 m)   Wt 63.5 kg (140 lb)   SpO2 98%   Breastfeeding No   BMI 22.60 kg/m²     Temp (24hrs), Av.1 °F (36.7 °C), Min:97.9 °F (36.6 °C), Max:98.5 °F (36.9 °C)      PHYSICAL EXAM:     General: NAD, alert, cooperative  Eyes: sclera anicteric  Oral Cavity: No thrush or ulcers  Neck: no JVD  Chest: Fair bilateral air entry. No Wheezing or Rhonchi. No rales. Heart: normal sounds  Abdomen: soft and non tender   : no cohen  Lower Extremities: no edema  Skin: no rash  Neuro: intact, no focals  Psychiatric: non-depressed          LAB DATA REVIEWED:    Recent Results (from the past 24 hour(s))   EKG, 12 LEAD, INITIAL    Collection Time: 23  4:04 PM   Result Value Ref Range    Ventricular Rate 93 BPM    Atrial Rate 93 BPM    P-R Interval 170 ms    QRS Duration 88 ms    Q-T Interval 378 ms    QTC Calculation (Bezet) 469 ms    Calculated P Axis 73 degrees    Calculated R Axis 106 degrees    Calculated T Axis 65 degrees    Diagnosis       Normal sinus rhythm  Rightward axis  Borderline ECG    Confirmed by Morena Downs MD, Nisha Pineda ([id number]) on 3/6/2023 5:01:57 PM     CBC WITH AUTOMATED DIFF    Collection Time: 23  4:37 PM   Result Value Ref Range    WBC 9.1 3.6 - 11.0 K/uL    RBC 3.68 (L) 3.80 - 5.20 M/uL    HGB 11.7 11.5 - 16.0 g/dL    HCT 36.1 35.0 - 47.0 %    MCV 98.1 80.0 - 99.0 FL    MCH 31.8 26.0 - 34.0 PG    MCHC 32.4 30.0 - 36.5 g/dL    RDW 16.8 (H) 11.5 - 14.5 %    PLATELET 669 (L) 078 - 400 K/uL    NRBC 0.0 0.0  WBC    ABSOLUTE NRBC 0.00 0.00 - 0.01 K/uL    NEUTROPHILS 75 32 - 75 %    LYMPHOCYTES 9 (L) 12 - 49 %    MONOCYTES 6 5 - 13 %    EOSINOPHILS 9 (H) 0 - 7 %    BASOPHILS 1 0 - 1 %    IMMATURE GRANULOCYTES 0 0 - 0.5 %    ABS. NEUTROPHILS 6.9 1.8 - 8.0 K/UL    ABS. LYMPHOCYTES 0.8 0.8 - 3.5 K/UL    ABS.  MONOCYTES 0.5 0.0 - 1.0 K/UL    ABS. EOSINOPHILS 0.8 (H) 0.0 - 0.4 K/UL    ABS. BASOPHILS 0.1 0.0 - 0.1 K/UL    ABS. IMM. GRANS. 0.0 0.00 - 0.04 K/UL    DF AUTOMATED     LIPASE    Collection Time: 03/06/23  5:55 PM   Result Value Ref Range    Lipase 92 73 - 442 U/L   METABOLIC PANEL, COMPREHENSIVE    Collection Time: 03/06/23  5:55 PM   Result Value Ref Range    Sodium 132 (L) 136 - 145 mmol/L    Potassium 6.3 (H) 3.5 - 5.1 mmol/L    Chloride 101 97 - 108 mmol/L    CO2 19 (L) 21 - 32 mmol/L    Anion gap 12 5 - 15 mmol/L    Glucose 175 (H) 65 - 100 mg/dL    BUN 84 (H) 6 - 20 mg/dL    Creatinine 10.80 (H) 0.55 - 1.02 mg/dL    BUN/Creatinine ratio 8 (L) 12 - 20      eGFR 4 (L) >60 ml/min/1.73m2    Calcium 10.1 8.5 - 10.1 mg/dL    Bilirubin, total 0.9 0.2 - 1.0 mg/dL    AST (SGOT) 22 15 - 37 U/L    ALT (SGPT) 24 12 - 78 U/L    Alk.  phosphatase 144 (H) 45 - 117 U/L    Protein, total 7.6 6.4 - 8.2 g/dL    Albumin 3.6 3.5 - 5.0 g/dL    Globulin 4.0 2.0 - 4.0 g/dL    A-G Ratio 0.9 (L) 1.1 - 2.2     COVID-19 WITH INFLUENZA A/B    Collection Time: 03/06/23 11:44 PM   Result Value Ref Range    SARS-CoV-2 by PCR Not Detected Not Detected      Influenza A by PCR Not Detected Not Detected      Influenza B by PCR Not Detected Not Detected     MRSA SCREEN - PCR (NASAL)    Collection Time: 03/07/23  3:40 AM   Result Value Ref Range    MRSA by PCR, Nasal Not Detected Not Detected     GLUCOSE, POC    Collection Time: 03/07/23  7:39 AM   Result Value Ref Range    Glucose (POC) 176 (H) 65 - 100 mg/dL    Performed by Penelope JIN WITH AUTOMATED DIFF    Collection Time: 03/07/23  7:50 AM   Result Value Ref Range    WBC 8.9 3.6 - 11.0 K/uL    RBC 3.63 (L) 3.80 - 5.20 M/uL    HGB 11.9 11.5 - 16.0 g/dL    HCT 34.8 (L) 35.0 - 47.0 %    MCV 95.9 80.0 - 99.0 FL    MCH 32.8 26.0 - 34.0 PG    MCHC 34.2 30.0 - 36.5 g/dL    RDW 16.9 (H) 11.5 - 14.5 %    PLATELET 924 460 - 507 K/uL    MPV 12.9 8.9 - 12.9 FL    NRBC 0.0 0.0  WBC    ABSOLUTE NRBC 0.00 0.00 - 0.01 K/uL    NEUTROPHILS 80 (H) 32 - 75 %    LYMPHOCYTES 11 (L) 12 - 49 %    MONOCYTES 5 5 - 13 %    EOSINOPHILS 2 0 - 7 %    BASOPHILS 1 0 - 1 %    IMMATURE GRANULOCYTES 1 (H) 0 - 0.5 %    ABS. NEUTROPHILS 7.2 1.8 - 8.0 K/UL    ABS. LYMPHOCYTES 1.0 0.8 - 3.5 K/UL    ABS. MONOCYTES 0.5 0.0 - 1.0 K/UL    ABS. EOSINOPHILS 0.2 0.0 - 0.4 K/UL    ABS. BASOPHILS 0.1 0.0 - 0.1 K/UL    ABS. IMM. GRANS. 0.0 0.00 - 0.04 K/UL    DF AUTOMATED     RENAL FUNCTION PANEL    Collection Time: 03/07/23  7:50 AM   Result Value Ref Range    Sodium 132 (L) 136 - 145 mmol/L    Potassium 6.5 (H) 3.5 - 5.1 mmol/L    Chloride 101 97 - 108 mmol/L    CO2 18 (L) 21 - 32 mmol/L    Anion gap 13 5 - 15 mmol/L    Glucose 188 (H) 65 - 100 mg/dL    BUN 95 (H) 6 - 20 mg/dL    Creatinine 11.60 (H) 0.55 - 1.02 mg/dL    BUN/Creatinine ratio 8 (L) 12 - 20      eGFR 3 (L) >60 ml/min/1.73m2    Calcium 9.7 8.5 - 10.1 mg/dL    Phosphorus 6.3 (H) 2.6 - 4.7 mg/dL    Albumin 3.4 (L) 3.5 - 5.0 g/dL       Recommendations/Plan:     #1 end-stage renal disease  -On hemodialysis every MWF. Last outpatient dialysis was on today per schedule. She missed Monday  -Currently being dialyzed. Patient seen on dialysis tolerating well  -I will plan for dialysis again in a.m. to put her back on MWF schedule  -Left arm AV fistula is current access    #2 shortness of breath  -Likely because of missed dialysis. Chest x-ray showed pulmonary edema  -He is currently being dialyzed with 3.3 L of fluid already removed  -Plan for dialysis again in a.m. #3 hyperkalemia  -Potassium was 6.5 on admission. Likely because of missed dialysis  -she is currently being dialyzed with 2K bath. We will plan with 2K bath again in a.m.  -Low potassium diet    #4 hypertension  -Blood pressure is well controlled with her home medications which I will continue    #5 abdominal pain  -Presented with abdominal pain for the past few weeks.   Had abdominal ultrasound which showed possible acute cholecystitis  -Has been empirically started on Zosyn.   Surgery consulted and HIDA scan has been ordered    Thank you for the consultation    ________________________________________________________________________  Signed: Reyne Kawasaki, MD

## 2023-03-07 NOTE — PROGRESS NOTES
Hospitalist Progress Note         Read KIMBERLYN Mercer FNP-C    Daily Progress Note: 3/7/2023      Subjective:   Subjective   Patient examined alert and oriented lying in bed. Reports abdominal discomfort on examination. Review of Systems:   Review of Systems   Constitutional:  Negative for chills and fever. Eyes:  Negative for blurred vision and double vision. Respiratory:  Negative for cough. Cardiovascular:  Negative for chest pain. Gastrointestinal:  Positive for abdominal pain and nausea. Genitourinary:  Negative for dysuria. Musculoskeletal:  Negative for myalgias. Objective:   Objective      Vitals:  Patient Vitals for the past 12 hrs:   Temp Pulse Resp BP SpO2   03/07/23 1105 -- 99 18 (!) 198/102 --   03/07/23 1035 97.9 °F (36.6 °C) (!) 103 20 (!) 209/98 --   03/07/23 1005 -- 98 -- (!) 202/96 --   03/07/23 0935 -- 94 -- (!) 191/91 --   03/07/23 0905 -- 94 -- (!) 188/92 --   03/07/23 0835 -- 93 -- (!) 185/90 --   03/07/23 0805 98.5 °F (36.9 °C) 97 24 (!) 229/94 --   03/07/23 0730 97.9 °F (36.6 °C) 95 17 (!) 159/89 98 %   03/07/23 0400 -- 95 -- -- --   03/07/23 0247 98 °F (36.7 °C) 95 19 (!) 160/88 --        Physical Exam:  Physical Exam  Vitals and nursing note reviewed. Constitutional:       Appearance: Normal appearance. Cardiovascular:      Rate and Rhythm: Regular rhythm. Tachycardia present. Pulmonary:      Effort: Pulmonary effort is normal.      Breath sounds: Normal breath sounds. Abdominal:      General: Bowel sounds are normal. There is distension. Tenderness: There is abdominal tenderness. Skin:     General: Skin is warm and dry. Neurological:      Mental Status: She is alert. Mental status is at baseline.         Lab Results:  Recent Results (from the past 24 hour(s))   EKG, 12 LEAD, INITIAL    Collection Time: 03/06/23  4:04 PM   Result Value Ref Range    Ventricular Rate 93 BPM    Atrial Rate 93 BPM    P-R Interval 170 ms    QRS Duration 88 ms    Q-T Interval 378 ms    QTC Calculation (Bezet) 469 ms    Calculated P Axis 73 degrees    Calculated R Axis 106 degrees    Calculated T Axis 65 degrees    Diagnosis       Normal sinus rhythm  Rightward axis  Borderline ECG    Confirmed by Rhiannon Dewitt MD, Selam Piper ([id number]) on 3/6/2023 5:01:57 PM     CBC WITH AUTOMATED DIFF    Collection Time: 03/06/23  4:37 PM   Result Value Ref Range    WBC 9.1 3.6 - 11.0 K/uL    RBC 3.68 (L) 3.80 - 5.20 M/uL    HGB 11.7 11.5 - 16.0 g/dL    HCT 36.1 35.0 - 47.0 %    MCV 98.1 80.0 - 99.0 FL    MCH 31.8 26.0 - 34.0 PG    MCHC 32.4 30.0 - 36.5 g/dL    RDW 16.8 (H) 11.5 - 14.5 %    PLATELET 190 (L) 511 - 400 K/uL    NRBC 0.0 0.0  WBC    ABSOLUTE NRBC 0.00 0.00 - 0.01 K/uL    NEUTROPHILS 75 32 - 75 %    LYMPHOCYTES 9 (L) 12 - 49 %    MONOCYTES 6 5 - 13 %    EOSINOPHILS 9 (H) 0 - 7 %    BASOPHILS 1 0 - 1 %    IMMATURE GRANULOCYTES 0 0 - 0.5 %    ABS. NEUTROPHILS 6.9 1.8 - 8.0 K/UL    ABS. LYMPHOCYTES 0.8 0.8 - 3.5 K/UL    ABS. MONOCYTES 0.5 0.0 - 1.0 K/UL    ABS. EOSINOPHILS 0.8 (H) 0.0 - 0.4 K/UL    ABS. BASOPHILS 0.1 0.0 - 0.1 K/UL    ABS. IMM. GRANS. 0.0 0.00 - 0.04 K/UL    DF AUTOMATED     LIPASE    Collection Time: 03/06/23  5:55 PM   Result Value Ref Range    Lipase 92 73 - 467 U/L   METABOLIC PANEL, COMPREHENSIVE    Collection Time: 03/06/23  5:55 PM   Result Value Ref Range    Sodium 132 (L) 136 - 145 mmol/L    Potassium 6.3 (H) 3.5 - 5.1 mmol/L    Chloride 101 97 - 108 mmol/L    CO2 19 (L) 21 - 32 mmol/L    Anion gap 12 5 - 15 mmol/L    Glucose 175 (H) 65 - 100 mg/dL    BUN 84 (H) 6 - 20 mg/dL    Creatinine 10.80 (H) 0.55 - 1.02 mg/dL    BUN/Creatinine ratio 8 (L) 12 - 20      eGFR 4 (L) >60 ml/min/1.73m2    Calcium 10.1 8.5 - 10.1 mg/dL    Bilirubin, total 0.9 0.2 - 1.0 mg/dL    AST (SGOT) 22 15 - 37 U/L    ALT (SGPT) 24 12 - 78 U/L    Alk.  phosphatase 144 (H) 45 - 117 U/L    Protein, total 7.6 6.4 - 8.2 g/dL    Albumin 3.6 3.5 - 5.0 g/dL    Globulin 4.0 2.0 - 4.0 g/dL    A-G Ratio 0.9 (L) 1.1 - 2.2     COVID-19 WITH INFLUENZA A/B    Collection Time: 03/06/23 11:44 PM   Result Value Ref Range    SARS-CoV-2 by PCR Not Detected Not Detected      Influenza A by PCR Not Detected Not Detected      Influenza B by PCR Not Detected Not Detected     MRSA SCREEN - PCR (NASAL)    Collection Time: 03/07/23  3:40 AM   Result Value Ref Range    MRSA by PCR, Nasal Not Detected Not Detected     GLUCOSE, POC    Collection Time: 03/07/23  7:39 AM   Result Value Ref Range    Glucose (POC) 176 (H) 65 - 100 mg/dL    Performed by Lisa Grey    CBC WITH AUTOMATED DIFF    Collection Time: 03/07/23  7:50 AM   Result Value Ref Range    WBC 8.9 3.6 - 11.0 K/uL    RBC 3.63 (L) 3.80 - 5.20 M/uL    HGB 11.9 11.5 - 16.0 g/dL    HCT 34.8 (L) 35.0 - 47.0 %    MCV 95.9 80.0 - 99.0 FL    MCH 32.8 26.0 - 34.0 PG    MCHC 34.2 30.0 - 36.5 g/dL    RDW 16.9 (H) 11.5 - 14.5 %    PLATELET 893 324 - 734 K/uL    MPV 12.9 8.9 - 12.9 FL    NRBC 0.0 0.0  WBC    ABSOLUTE NRBC 0.00 0.00 - 0.01 K/uL    NEUTROPHILS 80 (H) 32 - 75 %    LYMPHOCYTES 11 (L) 12 - 49 %    MONOCYTES 5 5 - 13 %    EOSINOPHILS 2 0 - 7 %    BASOPHILS 1 0 - 1 %    IMMATURE GRANULOCYTES 1 (H) 0 - 0.5 %    ABS. NEUTROPHILS 7.2 1.8 - 8.0 K/UL    ABS. LYMPHOCYTES 1.0 0.8 - 3.5 K/UL    ABS. MONOCYTES 0.5 0.0 - 1.0 K/UL    ABS. EOSINOPHILS 0.2 0.0 - 0.4 K/UL    ABS. BASOPHILS 0.1 0.0 - 0.1 K/UL    ABS. IMM.  GRANS. 0.0 0.00 - 0.04 K/UL    DF AUTOMATED     RENAL FUNCTION PANEL    Collection Time: 03/07/23  7:50 AM   Result Value Ref Range    Sodium 132 (L) 136 - 145 mmol/L    Potassium 6.5 (H) 3.5 - 5.1 mmol/L    Chloride 101 97 - 108 mmol/L    CO2 18 (L) 21 - 32 mmol/L    Anion gap 13 5 - 15 mmol/L    Glucose 188 (H) 65 - 100 mg/dL    BUN 95 (H) 6 - 20 mg/dL    Creatinine 11.60 (H) 0.55 - 1.02 mg/dL    BUN/Creatinine ratio 8 (L) 12 - 20      eGFR 3 (L) >60 ml/min/1.73m2    Calcium 9.7 8.5 - 10.1 mg/dL    Phosphorus 6.3 (H) 2.6 - 4.7 mg/dL    Albumin 3.4 (L) 3.5 - 5.0 g/dL      Results       Procedure Component Value Units Date/Time    MRSA SCREEN - PCR (NASAL) [[id number]] Collected: 03/07/23 0340    Order Status: Completed Specimen: Swab Updated: 03/07/23 0721     MRSA by PCR, Nasal Not Detected       COVID-19 WITH INFLUENZA A/B [[id number]] Collected: 03/06/23 2344    Order Status: Completed Specimen: Nasopharyngeal from Nasopharynx Updated: 03/07/23 0107     SARS-CoV-2 by PCR Not Detected        Comment: Not Detected results do not preclude SARS-CoV-2 infection and should not be used as the sole basis for patient management decisions. Results must be combined with clinical observations, patient history, and epidemiological information        Influenza A by PCR Not Detected        Influenza B by PCR Not Detected        Comment: Testing was performed using kalli Tiffanie SARS-CoV-2 and Influenza A/B nucleic acid assay. This test is a multiplex Real-Time Reverse Transcriptase Polymerase Chain Reaction (RT-PCR) based in vitro diagnostic test intended for the qualitative detection of nucleic acids from SARS-CoV-2, Influenza A, and Influenza B in nasopharyngeal and nasal swab specimens for use under the FDA's Emergency Use Authorization (EUA) only. Fact sheet for Patients: FindDrives.pl Fact sheet   for Healthcare Providers: FindDrives[employer]pl                  Diagnostic Images:  CT Results  (Last 48 hours)                 03/06/23 1742  CT ABD PELV WO CONT Final result    Impression:      1. Moderately distended gallbladder with sludge and calcified stone is again   seen. Consider abdominal ultrasound for further evaluation. 2. Bibasilar atelectasis with mild right and mild to moderate left pleural   effusions. 3. Please refer to above findings for complete details.         Narrative:  INDICATION: Abdominal pain       COMPARISON: 7/28/2022       TECHNIQUE:    Thin axial images were obtained through the abdomen and pelvis without intravenous iodinated contrast administration. Coronal and sagittal   reconstructions were generated. Oral contrast was not administered. CT dose   reduction was achieved through use of a standardized protocol tailored for this   examination and automatic exposure control for dose modulation. FINDINGS:       Lack of IV contrast limits evaluation of the solid abdominal organs. LIVER: No mass or biliary dilatation. GALLBLADDER: Moderately distended with moderate sludge and moderate size   gallstone. SPLEEN: Unremarkable   PANCREAS: No mass or ductal dilatation. ADRENALS: Unremarkable. KIDNEYS/URETERS: Atrophic bilateral kidneys with cortical scarring. Small   nonspecific low-density right renal lesion. No hydronephrosis    PERITONEUM: No abdominal lymphadenopathy or ascites. COLON: No dilatation or wall thickening. APPENDIX: Not visualized   SMALL BOWEL: No dilatation or wall thickening. STOMACH: Unremarkable. PELVIS: Small amount of pelvic free fluid. No pelvic lymphadenopathy   BONES: Moderate L5-S1 degenerative disc disease    VISUALIZED THORAX: Bibasilar atelectasis with mild right and mild to moderate   left pleural effusions   ADDITIONAL COMMENTS: Diffuse subcutaneous edema                    XR CHEST PORT   Final Result      Mild pulmonary edema and small left pleural effusion, similar to 8/27/2022. US RUQ   Final Result      Acute calculus cholecystitis with dilated CBD. Small amount of ascites   Medical renal disease         CT ABD PELV WO CONT   Final Result      1. Moderately distended gallbladder with sludge and calcified stone is again   seen. Consider abdominal ultrasound for further evaluation. 2. Bibasilar atelectasis with mild right and mild to moderate left pleural   effusions. 3. Please refer to above findings for complete details.        NM HEPATOBILIARY W INTERVENTION    (Results Pending)             Current Medications:    Current Facility-Administered Medications:     HYDROmorphone (DILAUDID) syringe 0.2 mg, 0.2 mg, IntraVENous, Q4H PRN, John Arnold MD    sevelamer carbonate (RENVELA) tab 2,400 mg, 2,400 mg, Oral, TID WITH MEALS, John Arnold MD    traMADoL (ULTRAM) tablet 50 mg, 50 mg, Oral, Q12H PRN, John Arnold MD    isosorbide mononitrate ER (IMDUR) tablet 60 mg, 60 mg, Oral, DAILY, Michael Arnold MD    ondansetron (ZOFRAN) 4 mg/2 mL injection, , , ,     sodium zirconium cyclosilicate (LOKELMA) powder packet 10 g, 10 g, Oral, NOW, Dorota Winter, NP    piperacillin-tazobactam (ZOSYN) 3.375 g in 0.9% sodium chloride (MBP/ADV) 100 mL MBP, 3.375 g, IntraVENous, Q12H, John Arnold MD    carvediloL (COREG) tablet 25 mg, 25 mg, Oral, BID WITH MEALS, Michael Arnold MD    pantoprazole (PROTONIX) tablet 40 mg, 40 mg, Oral, ACB, Michael Arnold MD    hydrALAZINE (APRESOLINE) tablet 25 mg, 25 mg, Oral, TID, John Arnold MD    sacubitriL-valsartan (ENTRESTO) 49-51 mg tablet 1 Tablet, 1 Tablet, Oral, BID, John Arnold MD    furosemide (LASIX) tablet 20 mg, 20 mg, Oral, DAILY, Michael Arnold MD    diclofenac (VOLTAREN) 1 % topical gel 2 g, 2 g, Topical, Q6H PRN, John Arnold MD    cholecalciferol (VITAMIN D3) (1000 Units /25 mcg) tablet 2,000 Units, 2,000 Units, Oral, DAILY, Michael Arnold MD    sodium chloride (NS) flush 5-40 mL, 5-40 mL, IntraVENous, Q8H, John Arnold MD, 10 mL at 03/07/23 0506    sodium chloride (NS) flush 5-40 mL, 5-40 mL, IntraVENous, PRN, John Arnold MD    acetaminophen (TYLENOL) tablet 650 mg, 650 mg, Oral, Q6H PRN **OR** acetaminophen (TYLENOL) suppository 650 mg, 650 mg, Rectal, Q6H PRN, John Arnold MD    polyethylene glycol (MIRALAX) packet 17 g, 17 g, Oral, DAILY PRN, John Arnold MD    ondansetron (ZOFRAN ODT) tablet 4 mg, 4 mg, Oral, Q8H PRN **OR** ondansetron (ZOFRAN) injection 4 mg, 4 mg, IntraVENous, Q6H PRN, John Arnold MD, 4 mg at 03/07/23 0808    guaiFENesin (ROBITUSSIN) 100 mg/5 mL oral liquid 100 mg, 100 mg, Oral, Q4H PRN, [place], Shanthi To MD       ASSESSMENT:  Sonja Vargas is a 70 y.o. female with PMH of ESRD, hypertension, diabetes and other medical problems as below. Poor historian with limited history provided. She presented to the ED with chief complaint of abdominal pain, nausea and vomiting for the past month. Abdominal pain mild-moderate, right-sided abdomen. Associated with nausea and vomiting, denies fever or chills. She also complain of coughing for the past 1 month, associated with some wheezing. No shortness of breath, orthopnea or LE swelling, no history of COPD or smoking. She missed the last hemodialysis session as she was not feeling well, last hemodialysis session was last Friday. Unsure who is her nephrologist.      In the ED, vital signs stable, afebrile. Abdominal ultrasound showed acute calculus cholecystitis. Chest X-ray showed mild pulmonary edema with small left pleural effusion. # Acute cholecystitis  - IV zosyn empirically  - HIDA scan pending  - Consulted cardiology for cardiac risk evaluation  - Clear liquid at midnight, NPO for breakfast.   - General surgery following    # Pulmonary edema  - Likely secondary to missed hemodialysis and CHF. - Chest X-ray to evaluate for pneumonia. - Screen for COVID-19 and influenza  - Hemodialysis per nephrology     # Hyperkalemia  - Secondary to ESRD. Hemodialysis   - 50mEq bicarb x 1.   - Continue to monitor   - Lokelma x1 dose     # ESRD  - Consulted nephrology for hemodialysis. Mon-Wed-Fri hemodialysis schedule. - Missed hemodialysis today, surgery recommended hemodialysis prior to surgery. - Continue home medications. PO sevelamer. # CHF  - Mildly fluid overload clinically, continue home dose PO lasix  - Continue home medications. PO entresto, BB. # CAD  - History of cabg in 2013  - Continue statin, BB. Hold plavix for possible surgery. # Essential hypertension   - Continue home medications.         # HLP   - bp remains elevated  - Continue lasix, hydralazine, imdur, entresto   - add prn IV hydralazine      Full Code  Dvt Prophylaxis: none  GI Prophylaxis: protonix  Discharge barriers: HIDA SCA< surgical clearance, ? Need for cholecystectomy      Above treatment plan reviewed and discussed with patient in detail at bedside, all questions answered. Care Plan discussed with: Patient/Family    Total time spent with patient: 30 minutes.     Palak Lane NP

## 2023-03-07 NOTE — DIALYSIS
William Sy in telemetry notified pt is in dialysis, box #31.   Hepatitis B surface Ag negative, drawn at [address] Renal 3/1/2023

## 2023-03-07 NOTE — PROGRESS NOTES
Hospitalist Progress Note         KIMBERLYN Carrasquillo, SHIRLEYP-C    Daily Progress Note: 3/7/2023      Subjective:   Subjective   Patient examined alert and oriented lying in bed. Reports abdominal discomfort on examination. Review of Systems:   Review of Systems   Constitutional:  Negative for chills and fever. Eyes:  Negative for blurred vision and double vision. Respiratory:  Negative for cough. Cardiovascular:  Negative for chest pain. Gastrointestinal:  Positive for abdominal pain and nausea. Genitourinary:  Negative for dysuria. Musculoskeletal:  Negative for myalgias. Objective:   Objective      Vitals:  Patient Vitals for the past 12 hrs:   Temp Pulse Resp BP SpO2   03/07/23 1105 -- 99 18 (!) 198/102 --   03/07/23 1035 97.9 °F (36.6 °C) (!) 103 20 (!) 209/98 --   03/07/23 1005 -- 98 -- (!) 202/96 --   03/07/23 0935 -- 94 -- (!) 191/91 --   03/07/23 0905 -- 94 -- (!) 188/92 --   03/07/23 0835 -- 93 -- (!) 185/90 --   03/07/23 0805 98.5 °F (36.9 °C) 97 24 (!) 229/94 --   03/07/23 0730 97.9 °F (36.6 °C) 95 17 (!) 159/89 98 %   03/07/23 0400 -- 95 -- -- --   03/07/23 0247 98 °F (36.7 °C) 95 19 (!) 160/88 --        Physical Exam:  Physical Exam  Vitals and nursing note reviewed. Constitutional:       Appearance: Normal appearance. Cardiovascular:      Rate and Rhythm: Regular rhythm. Tachycardia present. Pulmonary:      Effort: Pulmonary effort is normal.      Breath sounds: Normal breath sounds. Abdominal:      General: Bowel sounds are normal. There is distension. Tenderness: There is abdominal tenderness. Skin:     General: Skin is warm and dry. Neurological:      Mental Status: She is alert. Mental status is at baseline.         Lab Results:  Recent Results (from the past 24 hour(s))   EKG, 12 LEAD, INITIAL    Collection Time: 03/06/23  4:04 PM   Result Value Ref Range    Ventricular Rate 93 BPM    Atrial Rate 93 BPM    P-R Interval 170 ms    QRS Duration 88 ms    Q-T Interval 378 ms    QTC Calculation (Bezet) 469 ms    Calculated P Axis 73 degrees    Calculated R Axis 106 degrees    Calculated T Axis 65 degrees    Diagnosis       Normal sinus rhythm  Rightward axis  Borderline ECG    Confirmed by Olga Torres MD, Zuly Bragg ([id number]) on 3/6/2023 5:01:57 PM     CBC WITH AUTOMATED DIFF    Collection Time: 03/06/23  4:37 PM   Result Value Ref Range    WBC 9.1 3.6 - 11.0 K/uL    RBC 3.68 (L) 3.80 - 5.20 M/uL    HGB 11.7 11.5 - 16.0 g/dL    HCT 36.1 35.0 - 47.0 %    MCV 98.1 80.0 - 99.0 FL    MCH 31.8 26.0 - 34.0 PG    MCHC 32.4 30.0 - 36.5 g/dL    RDW 16.8 (H) 11.5 - 14.5 %    PLATELET 774 (L) 067 - 400 K/uL    NRBC 0.0 0.0  WBC    ABSOLUTE NRBC 0.00 0.00 - 0.01 K/uL    NEUTROPHILS 75 32 - 75 %    LYMPHOCYTES 9 (L) 12 - 49 %    MONOCYTES 6 5 - 13 %    EOSINOPHILS 9 (H) 0 - 7 %    BASOPHILS 1 0 - 1 %    IMMATURE GRANULOCYTES 0 0 - 0.5 %    ABS. NEUTROPHILS 6.9 1.8 - 8.0 K/UL    ABS. LYMPHOCYTES 0.8 0.8 - 3.5 K/UL    ABS. MONOCYTES 0.5 0.0 - 1.0 K/UL    ABS. EOSINOPHILS 0.8 (H) 0.0 - 0.4 K/UL    ABS. BASOPHILS 0.1 0.0 - 0.1 K/UL    ABS. IMM. GRANS. 0.0 0.00 - 0.04 K/UL    DF AUTOMATED     LIPASE    Collection Time: 03/06/23  5:55 PM   Result Value Ref Range    Lipase 92 73 - 045 U/L   METABOLIC PANEL, COMPREHENSIVE    Collection Time: 03/06/23  5:55 PM   Result Value Ref Range    Sodium 132 (L) 136 - 145 mmol/L    Potassium 6.3 (H) 3.5 - 5.1 mmol/L    Chloride 101 97 - 108 mmol/L    CO2 19 (L) 21 - 32 mmol/L    Anion gap 12 5 - 15 mmol/L    Glucose 175 (H) 65 - 100 mg/dL    BUN 84 (H) 6 - 20 mg/dL    Creatinine 10.80 (H) 0.55 - 1.02 mg/dL    BUN/Creatinine ratio 8 (L) 12 - 20      eGFR 4 (L) >60 ml/min/1.73m2    Calcium 10.1 8.5 - 10.1 mg/dL    Bilirubin, total 0.9 0.2 - 1.0 mg/dL    AST (SGOT) 22 15 - 37 U/L    ALT (SGPT) 24 12 - 78 U/L    Alk.  phosphatase 144 (H) 45 - 117 U/L    Protein, total 7.6 6.4 - 8.2 g/dL    Albumin 3.6 3.5 - 5.0 g/dL    Globulin 4.0 2.0 - 4.0 g/dL    A-G Ratio 0.9 (L) 1.1 - 2.2     COVID-19 WITH INFLUENZA A/B    Collection Time: 03/06/23 11:44 PM   Result Value Ref Range    SARS-CoV-2 by PCR Not Detected Not Detected      Influenza A by PCR Not Detected Not Detected      Influenza B by PCR Not Detected Not Detected     MRSA SCREEN - PCR (NASAL)    Collection Time: 03/07/23  3:40 AM   Result Value Ref Range    MRSA by PCR, Nasal Not Detected Not Detected     GLUCOSE, POC    Collection Time: 03/07/23  7:39 AM   Result Value Ref Range    Glucose (POC) 176 (H) 65 - 100 mg/dL    Performed by [employer]    CBC WITH AUTOMATED DIFF    Collection Time: 03/07/23  7:50 AM   Result Value Ref Range    WBC 8.9 3.6 - 11.0 K/uL    RBC 3.63 (L) 3.80 - 5.20 M/uL    HGB 11.9 11.5 - 16.0 g/dL    HCT 34.8 (L) 35.0 - 47.0 %    MCV 95.9 80.0 - 99.0 FL    MCH 32.8 26.0 - 34.0 PG    MCHC 34.2 30.0 - 36.5 g/dL    RDW 16.9 (H) 11.5 - 14.5 %    PLATELET 050 557 - 406 K/uL    MPV 12.9 8.9 - 12.9 FL    NRBC 0.0 0.0  WBC    ABSOLUTE NRBC 0.00 0.00 - 0.01 K/uL    NEUTROPHILS 80 (H) 32 - 75 %    LYMPHOCYTES 11 (L) 12 - 49 %    MONOCYTES 5 5 - 13 %    EOSINOPHILS 2 0 - 7 %    BASOPHILS 1 0 - 1 %    IMMATURE GRANULOCYTES 1 (H) 0 - 0.5 %    ABS. NEUTROPHILS 7.2 1.8 - 8.0 K/UL    ABS. LYMPHOCYTES 1.0 0.8 - 3.5 K/UL    ABS. MONOCYTES 0.5 0.0 - 1.0 K/UL    ABS. EOSINOPHILS 0.2 0.0 - 0.4 K/UL    ABS. BASOPHILS 0.1 0.0 - 0.1 K/UL    ABS. IMM.  GRANS. 0.0 0.00 - 0.04 K/UL    DF AUTOMATED     RENAL FUNCTION PANEL    Collection Time: 03/07/23  7:50 AM   Result Value Ref Range    Sodium 132 (L) 136 - 145 mmol/L    Potassium 6.5 (H) 3.5 - 5.1 mmol/L    Chloride 101 97 - 108 mmol/L    CO2 18 (L) 21 - 32 mmol/L    Anion gap 13 5 - 15 mmol/L    Glucose 188 (H) 65 - 100 mg/dL    BUN 95 (H) 6 - 20 mg/dL    Creatinine 11.60 (H) 0.55 - 1.02 mg/dL    BUN/Creatinine ratio 8 (L) 12 - 20      eGFR 3 (L) >60 ml/min/1.73m2    Calcium 9.7 8.5 - 10.1 mg/dL    Phosphorus 6.3 (H) 2.6 - 4.7 mg/dL    Albumin 3.4 (L) 3.5 - 5.0 g/dL      Results       Procedure Component Value Units Date/Time    MRSA SCREEN - PCR (NASAL) [[id number]] Collected: 03/07/23 0340    Order Status: Completed Specimen: Swab Updated: 03/07/23 0721     MRSA by PCR, Nasal Not Detected       COVID-19 WITH INFLUENZA A/B [[id number]] Collected: 03/06/23 2344    Order Status: Completed Specimen: Nasopharyngeal from Nasopharynx Updated: 03/07/23 0107     SARS-CoV-2 by PCR Not Detected        Comment: Not Detected results do not preclude SARS-CoV-2 infection and should not be used as the sole basis for patient management decisions. Results must be combined with clinical observations, patient history, and epidemiological information        Influenza A by PCR Not Detected        Influenza B by PCR Not Detected        Comment: Testing was performed using kalli Tiffanie SARS-CoV-2 and Influenza A/B nucleic acid assay. This test is a multiplex Real-Time Reverse Transcriptase Polymerase Chain Reaction (RT-PCR) based in vitro diagnostic test intended for the qualitative detection of nucleic acids from SARS-CoV-2, Influenza A, and Influenza B in nasopharyngeal and nasal swab specimens for use under the FDA's Emergency Use Authorization (EUA) only. Fact sheet for Patients: FindDrives.pl Fact sheet   for Healthcare Providers: FindDrives.pl                  Diagnostic Images:  CT Results  (Last 48 hours)                 03/06/23 1742  CT ABD PELV WO CONT Final result    Impression:      1. Moderately distended gallbladder with sludge and calcified stone is again   seen. Consider abdominal ultrasound for further evaluation. 2. Bibasilar atelectasis with mild right and mild to moderate left pleural   effusions. 3. Please refer to above findings for complete details.         Narrative:  INDICATION: Abdominal pain       COMPARISON: 7/28/2022       TECHNIQUE:    Thin axial images were obtained through the abdomen and pelvis without intravenous iodinated contrast administration. Coronal and sagittal   reconstructions were generated. Oral contrast was not administered. CT dose   reduction was achieved through use of a standardized protocol tailored for this   examination and automatic exposure control for dose modulation. FINDINGS:       Lack of IV contrast limits evaluation of the solid abdominal organs. LIVER: No mass or biliary dilatation. GALLBLADDER: Moderately distended with moderate sludge and moderate size   gallstone. SPLEEN: Unremarkable   PANCREAS: No mass or ductal dilatation. ADRENALS: Unremarkable. KIDNEYS/URETERS: Atrophic bilateral kidneys with cortical scarring. Small   nonspecific low-density right renal lesion. No hydronephrosis    PERITONEUM: No abdominal lymphadenopathy or ascites. COLON: No dilatation or wall thickening. APPENDIX: Not visualized   SMALL BOWEL: No dilatation or wall thickening. STOMACH: Unremarkable. PELVIS: Small amount of pelvic free fluid. No pelvic lymphadenopathy   BONES: Moderate L5-S1 degenerative disc disease    VISUALIZED THORAX: Bibasilar atelectasis with mild right and mild to moderate   left pleural effusions   ADDITIONAL COMMENTS: Diffuse subcutaneous edema                    XR CHEST PORT   Final Result      Mild pulmonary edema and small left pleural effusion, similar to 8/27/2022. US RUQ   Final Result      Acute calculus cholecystitis with dilated CBD. Small amount of ascites   Medical renal disease         CT ABD PELV WO CONT   Final Result      1. Moderately distended gallbladder with sludge and calcified stone is again   seen. Consider abdominal ultrasound for further evaluation. 2. Bibasilar atelectasis with mild right and mild to moderate left pleural   effusions. 3. Please refer to above findings for complete details.        NM HEPATOBILIARY W INTERVENTION    (Results Pending)             Current Medications:    Current Facility-Administered Medications:     HYDROmorphone (DILAUDID) syringe 0.2 mg, 0.2 mg, IntraVENous, Q4H PRN, Sydnee Arnold MD    sevelamer carbonate (RENVELA) tab 2,400 mg, 2,400 mg, Oral, TID WITH MEALS, Sydnee Arnold MD    traMADoL (ULTRAM) tablet 50 mg, 50 mg, Oral, Q12H PRN, Sydnee Arnold MD    isosorbide mononitrate ER (IMDUR) tablet 60 mg, 60 mg, Oral, DAILY, Michael Arnold MD    ondansetron (ZOFRAN) 4 mg/2 mL injection, , , ,     sodium zirconium cyclosilicate (LOKELMA) powder packet 10 g, 10 g, Oral, NOW, Dorota Winter, NP    piperacillin-tazobactam (ZOSYN) 3.375 g in 0.9% sodium chloride (MBP/ADV) 100 mL MBP, 3.375 g, IntraVENous, Q12H, Sydnee Arnold MD    carvediloL (COREG) tablet 25 mg, 25 mg, Oral, BID WITH MEALS, Michael Arnold MD    pantoprazole (PROTONIX) tablet 40 mg, 40 mg, Oral, ACB, Michael Arnold MD    hydrALAZINE (APRESOLINE) tablet 25 mg, 25 mg, Oral, TID, Sydnee Arnold MD    sacubitriL-valsartan (ENTRESTO) 49-51 mg tablet 1 Tablet, 1 Tablet, Oral, BID, Sydnee Arnold MD    furosemide (LASIX) tablet 20 mg, 20 mg, Oral, DAILY, Michael Arnold MD    diclofenac (VOLTAREN) 1 % topical gel 2 g, 2 g, Topical, Q6H PRN, Sydnee Arnold MD    cholecalciferol (VITAMIN D3) (1000 Units /25 mcg) tablet 2,000 Units, 2,000 Units, Oral, DAILY, Michael Arnold MD    sodium chloride (NS) flush 5-40 mL, 5-40 mL, IntraVENous, Q8H, Sydnee Arnold MD, 10 mL at 03/07/23 0506    sodium chloride (NS) flush 5-40 mL, 5-40 mL, IntraVENous, PRN, Sydnee Arnold MD    acetaminophen (TYLENOL) tablet 650 mg, 650 mg, Oral, Q6H PRN **OR** acetaminophen (TYLENOL) suppository 650 mg, 650 mg, Rectal, Q6H PRN, Sydnee Arnold MD    polyethylene glycol (MIRALAX) packet 17 g, 17 g, Oral, DAILY PRN, Sydnee Arnold MD    ondansetron (ZOFRAN ODT) tablet 4 mg, 4 mg, Oral, Q8H PRN **OR** ondansetron (ZOFRAN) injection 4 mg, 4 mg, IntraVENous, Q6H PRN, Sydnee Arnold MD, 4 mg at 03/07/23 0808    guaiFENesin (ROBITUSSIN) 100 mg/5 mL oral liquid 100 mg, 100 mg, Oral, Q4H PRN, Zhao Arnold MD       ASSESSMENT:  Ladan Luke is a 70 y.o. female with PMH of ESRD, hypertension, diabetes and other medical problems as below. Poor historian with limited history provided. She presented to the ED with chief complaint of abdominal pain, nausea and vomiting for the past month. Abdominal pain mild-moderate, right-sided abdomen. Associated with nausea and vomiting, denies fever or chills. She also complain of coughing for the past 1 month, associated with some wheezing. No shortness of breath, orthopnea or LE swelling, no history of COPD or smoking. She missed the last hemodialysis session as she was not feeling well, last hemodialysis session was last Friday. Unsure who is her nephrologist.      In the ED, vital signs stable, afebrile. Abdominal ultrasound showed acute calculus cholecystitis. Chest X-ray showed mild pulmonary edema with small left pleural effusion. # Acute cholecystitis  - IV zosyn empirically  - HIDA scan pending  - Consulted cardiology for cardiac risk evaluation  - Clear liquid at midnight, NPO for breakfast.   - General surgery following    # Pulmonary edema  - Likely secondary to missed hemodialysis and CHF. - Chest X-ray to evaluate for pneumonia. - Screen for COVID-19 and influenza  - Hemodialysis per nephrology     # Hyperkalemia  - Secondary to ESRD. Hemodialysis   - 50mEq bicarb x 1.   - Continue to monitor   - Lokelma x1 dose     # ESRD  - Consulted nephrology for hemodialysis. Mon-Wed-Fri hemodialysis schedule. - Missed hemodialysis today, surgery recommended hemodialysis prior to surgery. - Continue home medications. PO sevelamer. # CHF  - Mildly fluid overload clinically, continue home dose PO lasix  - Continue home medications. PO entresto, BB. # CAD  - History of cabg in 2013  - Continue statin, BB. Hold plavix for possible surgery. # Essential hypertension   - Continue home medications.         # HLP   - bp remains elevated  - Continue lasix, hydralazine, imdur, entresto   - add prn IV hydralazine      Full Code  Dvt Prophylaxis: none  GI Prophylaxis: protonix  Discharge barriers: HIDA SCA< surgical clearance, ? Need for cholecystectomy      Above treatment plan reviewed and discussed with patient in detail at bedside, all questions answered. Care Plan discussed with: Patient/Family    Total time spent with patient: 30 minutes.     Tina Duke, NP

## 2023-03-07 NOTE — H&P
History and Physical    Patient: Yoan Hilario MRN: [id number]  SSN: [id number]    YOB: 1955  Age: 70 y.o. Sex: female      Subjective: Yoan Hilario is a 70 y.o. female with PMH of ESRD, hypertension, diabetes and other medical problems as below. Poor historian with limited history provided. She presented to the ED with chief complaint of abdominal pain, nausea and vomiting for the past month. Abdominal pain mild-moderate, right-sided abdomen. Associated with nausea and vomiting, denies fever or chills. She also complain of coughing for the past 1 month, associated with some wheezing. No shortness of breath, orthopnea or LE swelling, no history of COPD or smoking. She missed the last hemodialysis session as she was not feeling well, last hemodialysis session was last Friday. Unsure who is her nephrologist.     In the ED, vital signs stable, afebrile. Abdominal ultrasound showed acute calculus cholecystitis. Chest X-ray showed mild pulmonary edema with small left pleural effusion. Chart review: none    Past Medical History:   Diagnosis Date    Blind     CKD (chronic kidney disease)     HTN (hypertension)     Retinopathy     Type II or unspecified type diabetes mellitus with renal manifestations, uncontrolled(250.42) ([facility] [address]      Family History   Problem Relation Age of Onset    Diabetes Maternal Aunt     Diabetes Maternal Grandmother      Social History     Tobacco Use    Smoking status: Never    Smokeless tobacco: Never   Substance Use Topics    Alcohol use: No        Objective:     Physical Exam:   General: alert, cooperative, no distress  Eye: conjunctivae/corneas clear. PERRL, EOM's intact. Throat and Neck: normal and no erythema or exudates noted. No mass   Lung: course breath sounds on auscultation bilaterally, no wheezing. Heart: regular rate and rhythm,   Abdomen: soft, non-tender. Bowel sounds normal. No masses,  Extremities: 1+ LE edema.  able to move all extremities normal, atraumatic  Skin: Normal.  Neurologic: Motor function and sensation grossly intact. Psychiatric: non focal    Most recent lab work and imaging results reviewed in EMR. Assessment and plan:   # Acute cholecystitis  - IV zosyn empirically  - Consult surgery for potential intervention  - Consult cardiology for cardiac risk evaluation  - Clear liquid at midnight, NPO for breakfast.     # Pulmonary edema  - Likely secondary to missed hemodialysis and CHF. - Chest X-ray to evaluate for pneumonia. - Screen for COVID-19 and influenza  - Hemodialysis per nephrology    # Hyperkalemia  - Secondary to ESRD. Hemodialysis in AM.  - 50mEq bicarb x 1.   - Continue to monitor     # ESRD  - Consult nephrology for hemodialysis. Mon-Wed-Fri hemodialysis schedule. - Missed hemodialysis today, surgery recommended hemodialysis prior to surgery. - Continue home medications. PO sevelamer. # CHF  - Mildly fluid overload clinically, continue home dose PO lasix  - Continue home medications. PO entresto, BB. # CAD  - History of cabg in 2013  - Continue statin, BB. Hold plavix for possible surgery. # Essential hypertension   - Continue home medications. # HLP   - Continue home medications. PO crestor. # Social Determents of health: None    # Full code by default, need further clarification    # Medication reconciliation: Medication list reviewed on Epic and/or outside documentation. Not reviewed with patient. However, medication reconciliation incomplete, appreciate assistance from pharmacy or nursing staff.     Signed By: Cachorro Terry MD     March 6, 2023

## 2023-03-07 NOTE — DIALYSIS
Pt walt 3 hour hemodialysis well. 3.3 liters net fluid removed. Report called to [address] [facility], given to [place].

## 2023-03-08 LAB
ALBUMIN SERPL-MCNC: 2.8 G/DL (ref 3.5–5)
ANION GAP SERPL CALC-SCNC: 9 MMOL/L (ref 5–15)
BASOPHILS # BLD: 0.1 K/UL (ref 0–0.1)
BASOPHILS NFR BLD: 1 % (ref 0–1)
BUN SERPL-MCNC: 47 MG/DL (ref 6–20)
BUN/CREAT SERPL: 6 (ref 12–20)
CA-I BLD-MCNC: 8.4 MG/DL (ref 8.5–10.1)
CHLORIDE SERPL-SCNC: 101 MMOL/L (ref 97–108)
CO2 SERPL-SCNC: 26 MMOL/L (ref 21–32)
CREAT SERPL-MCNC: 8.17 MG/DL (ref 0.55–1.02)
DIFFERENTIAL METHOD BLD: ABNORMAL
EOSINOPHIL # BLD: 1.1 K/UL (ref 0–0.4)
EOSINOPHIL NFR BLD: 19 % (ref 0–7)
ERYTHROCYTE [DISTWIDTH] IN BLOOD BY AUTOMATED COUNT: 17 % (ref 11.5–14.5)
GLUCOSE SERPL-MCNC: 121 MG/DL (ref 65–100)
HCT VFR BLD AUTO: 31.4 % (ref 35–47)
HGB BLD-MCNC: 10 G/DL (ref 11.5–16)
IMM GRANULOCYTES # BLD AUTO: 0 K/UL (ref 0–0.04)
IMM GRANULOCYTES NFR BLD AUTO: 0 % (ref 0–0.5)
LYMPHOCYTES # BLD: 0.9 K/UL (ref 0.8–3.5)
LYMPHOCYTES NFR BLD: 16 % (ref 12–49)
MCH RBC QN AUTO: 31.4 PG (ref 26–34)
MCHC RBC AUTO-ENTMCNC: 31.8 G/DL (ref 30–36.5)
MCV RBC AUTO: 98.7 FL (ref 80–99)
MONOCYTES # BLD: 0.5 K/UL (ref 0–1)
MONOCYTES NFR BLD: 9 % (ref 5–13)
NEUTS SEG # BLD: 3.1 K/UL (ref 1.8–8)
NEUTS SEG NFR BLD: 55 % (ref 32–75)
NRBC # BLD: 0 K/UL (ref 0–0.01)
NRBC BLD-RTO: 0 PER 100 WBC
PHOSPHATE SERPL-MCNC: 5.8 MG/DL (ref 2.6–4.7)
PLATELET # BLD AUTO: 137 K/UL (ref 150–400)
PMV BLD AUTO: 11.6 FL (ref 8.9–12.9)
POTASSIUM SERPL-SCNC: 4.6 MMOL/L (ref 3.5–5.1)
RBC # BLD AUTO: 3.18 M/UL (ref 3.8–5.2)
RBC MORPH BLD: ABNORMAL
SODIUM SERPL-SCNC: 136 MMOL/L (ref 136–145)
WBC # BLD AUTO: 5.7 K/UL (ref 3.6–11)

## 2023-03-08 PROCEDURE — 74011250636 HC RX REV CODE- 250/636: Performed by: INTERNAL MEDICINE

## 2023-03-08 PROCEDURE — 90935 HEMODIALYSIS ONE EVALUATION: CPT

## 2023-03-08 PROCEDURE — 74011000250 HC RX REV CODE- 250: Performed by: INTERNAL MEDICINE

## 2023-03-08 PROCEDURE — 80069 RENAL FUNCTION PANEL: CPT

## 2023-03-08 PROCEDURE — 85025 COMPLETE CBC W/AUTO DIFF WBC: CPT

## 2023-03-08 PROCEDURE — 74011250637 HC RX REV CODE- 250/637: Performed by: NURSE PRACTITIONER

## 2023-03-08 PROCEDURE — 5A1D70Z PERFORMANCE OF URINARY FILTRATION, INTERMITTENT, LESS THAN 6 HOURS PER DAY: ICD-10-PCS | Performed by: INTERNAL MEDICINE

## 2023-03-08 PROCEDURE — 74011250637 HC RX REV CODE- 250/637

## 2023-03-08 PROCEDURE — 36415 COLL VENOUS BLD VENIPUNCTURE: CPT

## 2023-03-08 PROCEDURE — 96376 TX/PRO/DX INJ SAME DRUG ADON: CPT

## 2023-03-08 PROCEDURE — 74011250637 HC RX REV CODE- 250/637: Performed by: INTERNAL MEDICINE

## 2023-03-08 PROCEDURE — 74011000258 HC RX REV CODE- 258: Performed by: INTERNAL MEDICINE

## 2023-03-08 PROCEDURE — 99232 SBSQ HOSP IP/OBS MODERATE 35: CPT | Performed by: SURGERY

## 2023-03-08 PROCEDURE — 65270000029 HC RM PRIVATE

## 2023-03-08 PROCEDURE — G0378 HOSPITAL OBSERVATION PER HR: HCPCS

## 2023-03-08 RX ORDER — TRAMADOL HYDROCHLORIDE 50 MG/1
TABLET ORAL
Status: COMPLETED
Start: 2023-03-08 | End: 2023-03-08

## 2023-03-08 RX ADMIN — PIPERACILLIN AND TAZOBACTAM 3.38 G: 3; .375 INJECTION, POWDER, FOR SOLUTION INTRAVENOUS at 12:39

## 2023-03-08 RX ADMIN — TRAMADOL HYDROCHLORIDE 50 MG: 50 TABLET ORAL at 08:13

## 2023-03-08 RX ADMIN — SODIUM CHLORIDE, PRESERVATIVE FREE 10 ML: 5 INJECTION INTRAVENOUS at 21:17

## 2023-03-08 RX ADMIN — HYDRALAZINE HYDROCHLORIDE 50 MG: 50 TABLET, FILM COATED ORAL at 12:39

## 2023-03-08 RX ADMIN — CARVEDILOL 25 MG: 12.5 TABLET, FILM COATED ORAL at 17:01

## 2023-03-08 RX ADMIN — ISOSORBIDE MONONITRATE 60 MG: 30 TABLET, EXTENDED RELEASE ORAL at 12:38

## 2023-03-08 RX ADMIN — HYDRALAZINE HYDROCHLORIDE 50 MG: 50 TABLET, FILM COATED ORAL at 17:01

## 2023-03-08 RX ADMIN — SEVELAMER CARBONATE 2400 MG: 800 TABLET, FILM COATED ORAL at 12:39

## 2023-03-08 RX ADMIN — SODIUM CHLORIDE, PRESERVATIVE FREE 10 ML: 5 INJECTION INTRAVENOUS at 13:56

## 2023-03-08 RX ADMIN — FUROSEMIDE 20 MG: 40 TABLET ORAL at 12:39

## 2023-03-08 RX ADMIN — HYDRALAZINE HYDROCHLORIDE 50 MG: 50 TABLET, FILM COATED ORAL at 21:17

## 2023-03-08 RX ADMIN — SEVELAMER CARBONATE 2400 MG: 800 TABLET, FILM COATED ORAL at 17:00

## 2023-03-08 RX ADMIN — Medication 2000 UNITS: at 12:39

## 2023-03-08 RX ADMIN — SACUBITRIL AND VALSARTAN 1 TABLET: 49; 51 TABLET, FILM COATED ORAL at 12:38

## 2023-03-08 RX ADMIN — SODIUM CHLORIDE, PRESERVATIVE FREE 10 ML: 5 INJECTION INTRAVENOUS at 06:09

## 2023-03-08 RX ADMIN — SACUBITRIL AND VALSARTAN 1 TABLET: 49; 51 TABLET, FILM COATED ORAL at 21:17

## 2023-03-08 NOTE — PROGRESS NOTES
[facility]        Subjective     Denies significant abdominal pain  No N/V    Objective     Patient Vitals for the past 24 hrs:   Temp Pulse Resp BP SpO2   03/08/23 1030 -- 76 18 (!) 162/73 --   03/08/23 1000 -- 76 18 (!) 148/70 --   03/08/23 0930 -- 72 18 (!) 156/72 --   03/08/23 0900 -- 74 18 (!) 153/70 --   03/08/23 0830 -- 79 16 135/63 --   03/08/23 0800 98.2 °F (36.8 °C) 81 18 (!) 145/66 --   03/08/23 0716 97.5 °F (36.4 °C) 78 18 (!) 137/95 90 %   03/08/23 0308 98.2 °F (36.8 °C) 86 18 119/62 91 %   03/07/23 1956 98.7 °F (37.1 °C) 84 18 137/69 94 %   03/07/23 1454 98.9 °F (37.2 °C) 97 18 (!) 178/86 95 %       Date 03/07/23 0700 - 03/08/23 0659 03/08/23 0700 - 03/09/23 0659   Shift 4960-7734 4610-2085 24 Hour Total 7848-2376 0227-8069 24 Hour Total   INTAKE   Shift Total(mL/kg)         OUTPUT   Urine(mL/kg/hr)           Urine Occurrence(s)    1 x  1 x   Dialysis 3300  3300 2540  2540     NET Fluid Removed (mL) 3300  3300 2540  2540   Shift Total(mL/kg) 3300(50)  3300(50) 7784(24.9)  9571(64.2)   NET -3300  -3300 -2540  -2540   Weight (kg) 66 66 66 66 66 66       PE  GEN - Awake, alert, communicating appropriately.   NAD  Pulm - CTAB  CV - RRR  Abd - soft, ND, NT, negative Rodriguez sign      Labs  Recent Results (from the past 24 hour(s))   GLUCOSE, POC    Collection Time: 03/07/23  2:35 PM   Result Value Ref Range    Glucose (POC) 92 65 - 100 mg/dL    Performed by [id number] POC    Collection Time: 03/07/23  4:02 PM   Result Value Ref Range    Glucose (POC) 105 (H) 65 - 100 mg/dL    Performed by Gaby Her    RENAL FUNCTION PANEL    Collection Time: 03/08/23  7:45 AM   Result Value Ref Range    Sodium 136 136 - 145 mmol/L    Potassium 4.6 3.5 - 5.1 mmol/L    Chloride 101 97 - 108 mmol/L    CO2 26 21 - 32 mmol/L    Anion gap 9 5 - 15 mmol/L    Glucose 121 (H) 65 - 100 mg/dL    BUN 47 (H) 6 - 20 mg/dL    Creatinine 8.17 (H) 0.55 - 1.02 mg/dL    BUN/Creatinine ratio 6 (L) 12 - 20      eGFR 5 (L) >60 ml/min/1.73m2    Calcium 8.4 (L) 8.5 - 10.1 mg/dL    Phosphorus 5.8 (H) 2.6 - 4.7 mg/dL    Albumin 2.8 (L) 3.5 - 5.0 g/dL     HIDA:  FINDINGS:  The initial images demonstrate prompt hepatic tracer uptake. The common bile  duct is visualized at 12 minutes. The gallbladder is not visualized despite  imaging up to 120 minutes. The duodenum is visualized at 28 minutes. IMPRESSION  The gallbladder is not visualized despite imaging up to 120 minutes. This is concerning for cystic duct obstruction although this could also  represent gallbladder dysfunction. Assessment     Anai Gonzalez is a 70 y. o.yr old female with ESRD, CHF, presenting with abdominal pain  Pain resolved  HIDA consistent with cholecystitis, likely acute on chronic cholecystitis    Plan     Patient high risk for surgery, cardiology input appreciated  Had discussion with patient about cholecystostomy placement vs. Observation/antibiotics  She was treated non-operatively in past  She will discuss with family  Follow labs    35 mins of time was spent with the patient of which > 50% of the time involved face-to-face counseling of the patient regarding the proposed treatment plan.     Cassandra Calderón MD

## 2023-03-08 NOTE — PROGRESS NOTES
Renal Note    NAME:  Jonathan Courtney   :   1955   MRN:   [id number]     ATTENDING: Kallie Silver MD  PCP:  Stevo Murray MD    Date/Time:  3/8/2023 12:11 PM      Subjective:   REQUESTING PHYSICIAN:  REASON FOR CONSULT:    esrd    Patient seen in the room. She was dialyzed this morning with 2.5 L fluid removal.  Denies any shortness of breath now    Past Medical History:   Diagnosis Date    Blind     CKD (chronic kidney disease)     HTN (hypertension)     Retinopathy     Type II or unspecified type diabetes mellitus with renal manifestations, uncontrolled(250.42) ([facility])       Past Surgical History:   Procedure Laterality Date    HX CORONARY ARTERY BYPASS GRAFT      HX GYN      hysterectomy      Social History     Tobacco Use    Smoking status: Never    Smokeless tobacco: Never   Substance Use Topics    Alcohol use: No      Family History   Problem Relation Age of Onset    Diabetes Maternal Aunt     Diabetes Maternal Grandmother        No Known Allergies   Prior to Admission medications    Medication Sig Start Date End Date Taking? Authorizing Provider   isosorbide dinitrate (ISORDIL) 10 mg tablet Take 1 Tablet by mouth three (3) times daily. 22   Rika Polk MD   diclofenac (VOLTAREN) 1 % gel apply to foot and hands as needed for arthritis pain 21   Provider, Historical   cholecalciferol (VITAMIN D3) (1000 Units /25 mcg) tablet Take 2,000 Units by mouth daily. Provider, Historical   furosemide (LASIX) 20 mg tablet Take 20 mg by mouth daily. Provider, Historical   rosuvastatin (CRESTOR) 20 mg tablet TAKE 1 TABLET NIGHTLY (STOP 40 MG DOSE) 21   Hettie Klinefelter, MD   amLODIPine (NORVASC) 10 mg tablet Take 10 mg by mouth daily. Provider, Historical   clopidogreL (PLAVIX) 75 mg tab Take 75 mg by mouth daily. Provider, Historical   sacubitriL-valsartan (Entresto) 49-51 mg tab tablet Take 1 Tab by mouth two (2) times a day.     Provider, Historical   insulin glargine (Lantus Solostar U-100 Insulin) 100 unit/mL (3 mL) inpn Inject 8 units daily. 20   Gerhard Alexander MD   hydrALAZINE (APRESOLINE) 25 mg tablet Take 25 mg by mouth three (3) times daily. 3/11/19   Provider, Historical   sevelamer carbonate (RENVELA) 800 mg tab tab Take  by mouth three (3) times daily. Provider, Historical   pantoprazole (PROTONIX) 40 mg tablet  17   Provider, Historical   acetaminophen (TYLENOL) 500 mg tablet Take  by mouth every six (6) hours as needed for Pain. Provider, Historical   docusate sodium (COLACE) 100 mg capsule Take 100 mg by mouth as needed for Constipation. Provider, Historical   carvediloL (COREG) 25 mg tablet Take 25 mg by mouth two (2) times daily (with meals). Provider, Historical   ferrous sulfate 325 mg (65 mg iron) tablet Take 325 mg by mouth as needed. Provider, Historical       REVIEW OF SYSTEMS:       Constitutional: Negative for chills and fever. HENT: Negative. Eyes: Negative. Respiratory: Negative. Cardiovascular: Negative. Gastrointestinal: Negative for abdominal pain and nausea. Skin: Negative. Neurological: Negative. Objective:   VITALS:    Visit Vitals  /60 (BP 1 Location: Left upper arm, BP Patient Position: At rest)   Pulse 84   Temp 98.2 °F (36.8 °C)   Resp 18   Ht 5' 6\" (1.676 m)   Wt 66 kg (145 lb 8.1 oz)   SpO2 94%   Breastfeeding No   BMI 23.48 kg/m²     Temp (24hrs), Av.2 °F (36.8 °C), Min:97.5 °F (36.4 °C), Max:98.7 °F (37.1 °C)      PHYSICAL EXAM:     General: NAD, alert, cooperative  Eyes: sclera anicteric  Oral Cavity: No thrush or ulcers  Neck: no JVD  Chest: Fair bilateral air entry. No Wheezing or Rhonchi. No rales.   Heart: normal sounds  Abdomen: soft and non tender   : no cohen  Lower Extremities: no edema  Skin: no rash  Neuro: intact, no focals  Psychiatric: non-depressed          LAB DATA REVIEWED:    Recent Results (from the past 24 hour(s))   GLUCOSE, POC    Collection Time: 23  4:02 PM Result Value Ref Range    Glucose (POC) 105 (H) 65 - 100 mg/dL    Performed by [facility]    RENAL FUNCTION PANEL    Collection Time: 03/08/23  7:45 AM   Result Value Ref Range    Sodium 136 136 - 145 mmol/L    Potassium 4.6 3.5 - 5.1 mmol/L    Chloride 101 97 - 108 mmol/L    CO2 26 21 - 32 mmol/L    Anion gap 9 5 - 15 mmol/L    Glucose 121 (H) 65 - 100 mg/dL    BUN 47 (H) 6 - 20 mg/dL    Creatinine 8.17 (H) 0.55 - 1.02 mg/dL    BUN/Creatinine ratio 6 (L) 12 - 20      eGFR 5 (L) >60 ml/min/1.73m2    Calcium 8.4 (L) 8.5 - 10.1 mg/dL    Phosphorus 5.8 (H) 2.6 - 4.7 mg/dL    Albumin 2.8 (L) 3.5 - 5.0 g/dL       Recommendations/Plan:     #1 end-stage renal disease  -On hemodialysis every MWF. Last outpatient dialysis was on today per schedule. She missed Monday  -She was dialyzed yesterday for volume overload. Dialyzed again today. 2.5 L fluid removal to put her back on the MWF schedule  Next dialysis on Friday  -Left arm AV fistula is current access    #2 shortness of breath  -Likely because of missed dialysis. Chest x-ray showed pulmonary edema  -Dialyzed yesterday with 3 L fluid removal.  2.5 L fluid removed today-Plan for dialysis again in a.m. #3 hyperkalemia  -Potassium was 6.5 on admission. Likely because of missed dialysis  -She was dialyzed with 2K bath yesterday and today. Potassium 4.6 today-Low potassium diet    #4 hypertension  -Blood pressure is well controlled with her home medications which I will continue    #5 abdominal pain  -Presented with abdominal pain for the past few weeks. Had abdominal ultrasound which showed possible acute cholecystitis  -Has been empirically started on Zosyn.   Surgery consulted and HIDA scan has been ordered        ________________________________________________________________________  Signed: Rashaad Crockett MD

## 2023-03-08 NOTE — PROGRESS NOTES
[facility]        Subjective     Denies significant abdominal pain  No N/V    Objective     Patient Vitals for the past 24 hrs:   Temp Pulse Resp BP SpO2   03/08/23 1030 -- 76 18 (!) 162/73 --   03/08/23 1000 -- 76 18 (!) 148/70 --   03/08/23 0930 -- 72 18 (!) 156/72 --   03/08/23 0900 -- 74 18 (!) 153/70 --   03/08/23 0830 -- 79 16 135/63 --   03/08/23 0800 98.2 °F (36.8 °C) 81 18 (!) 145/66 --   03/08/23 0716 97.5 °F (36.4 °C) 78 18 (!) 137/95 90 %   03/08/23 0308 98.2 °F (36.8 °C) 86 18 119/62 91 %   03/07/23 1956 98.7 °F (37.1 °C) 84 18 137/69 94 %   03/07/23 1454 98.9 °F (37.2 °C) 97 18 (!) 178/86 95 %       Date 03/07/23 0700 - 03/08/23 0659 03/08/23 0700 - 03/09/23 0659   Shift 6058-3306 0987-0134 24 Hour Total 2590-8235 6064-4538 24 Hour Total   INTAKE   Shift Total(mL/kg)         OUTPUT   Urine(mL/kg/hr)           Urine Occurrence(s)    1 x  1 x   Dialysis 3300  3300 2540  2540     NET Fluid Removed (mL) 3300  3300 2540  2540   Shift Total(mL/kg) 3300(50)  3300(50) 1593(37.2)  0691(07.3)   NET -3300  -3300 -2540  -2540   Weight (kg) 66 66 66 66 66 66       PE  GEN - Awake, alert, communicating appropriately.   NAD  Pulm - CTAB  CV - RRR  Abd - soft, ND, NT, negative Rodriguez sign      Labs  Recent Results (from the past 24 hour(s))   GLUCOSE, POC    Collection Time: 03/07/23  2:35 PM   Result Value Ref Range    Glucose (POC) 92 65 - 100 mg/dL    Performed by [id number] POC    Collection Time: 03/07/23  4:02 PM   Result Value Ref Range    Glucose (POC) 105 (H) 65 - 100 mg/dL    Performed by Mare Her    RENAL FUNCTION PANEL    Collection Time: 03/08/23  7:45 AM   Result Value Ref Range    Sodium 136 136 - 145 mmol/L    Potassium 4.6 3.5 - 5.1 mmol/L    Chloride 101 97 - 108 mmol/L    CO2 26 21 - 32 mmol/L    Anion gap 9 5 - 15 mmol/L    Glucose 121 (H) 65 - 100 mg/dL    BUN 47 (H) 6 - 20 mg/dL    Creatinine 8.17 (H) 0.55 - 1.02 mg/dL    BUN/Creatinine ratio 6 (L) 12 - 20      eGFR 5 (L) >60 ml/min/1.73m2    Calcium 8.4 (L) 8.5 - 10.1 mg/dL    Phosphorus 5.8 (H) 2.6 - 4.7 mg/dL    Albumin 2.8 (L) 3.5 - 5.0 g/dL     HIDA:  FINDINGS:  The initial images demonstrate prompt hepatic tracer uptake. The common bile  duct is visualized at 12 minutes. The gallbladder is not visualized despite  imaging up to 120 minutes. The duodenum is visualized at 28 minutes. IMPRESSION  The gallbladder is not visualized despite imaging up to 120 minutes. This is concerning for cystic duct obstruction although this could also  represent gallbladder dysfunction. Assessment     Jana Bowie is a 70 y. o.yr old female with ESRD, CHF, presenting with abdominal pain  Pain resolved  HIDA consistent with cholecystitis, likely acute on chronic cholecystitis    Plan     Patient high risk for surgery, cardiology input appreciated  Had discussion with patient about cholecystostomy placement vs. Observation/antibiotics  She was treated non-operatively in past  She will discuss with family  Follow labs    35 mins of time was spent with the patient of which > 50% of the time involved face-to-face counseling of the patient regarding the proposed treatment plan.     Earl Schmid MD

## 2023-03-08 NOTE — DIALYSIS
[facility] 15 pt tolerated 3h  dialysis well with 2.5 L fluid removal.    Report via SBAR to [employer].  M-[employer] tele tech Matthew Campos notified as pt left department at [phone] for floor.

## 2023-03-08 NOTE — DIALYSIS
[facility] 15 pt arrives to dialysis alert and bright. Pt states she is legally blind, assured pt verbalization of plan for day. Pt appreciative. Dialysis initiated via RUEF, + thrill, + bruit, good blood return arterial and venous, lab drawn and taken to lab.

## 2023-03-08 NOTE — PROGRESS NOTES
Hospitalist Progress Note         Jojo AjKIMBERLYN alvarez, FNP-C    Daily Progress Note: 3/8/2023      Subjective:   Subjective   Patient examined alert and oriented lying in bed. No concerns offered on examination    Review of Systems:   Review of Systems   Constitutional:  Negative for chills and fever. Eyes:  Negative for blurred vision and double vision. Respiratory:  Negative for cough. Cardiovascular:  Negative for chest pain. Gastrointestinal:  Positive for abdominal pain. Negative for nausea. Genitourinary:  Negative for dysuria. Musculoskeletal:  Negative for myalgias. Objective:   Objective      Vitals:  Patient Vitals for the past 12 hrs:   Temp Pulse Resp BP SpO2   03/08/23 1000 -- 76 18 (!) 148/70 --   03/08/23 0930 -- 72 18 (!) 156/72 --   03/08/23 0900 -- 74 18 (!) 153/70 --   03/08/23 0830 -- 79 16 135/63 --   03/08/23 0800 98.2 °F (36.8 °C) 81 18 (!) 145/66 --   03/08/23 0716 97.5 °F (36.4 °C) 78 18 (!) 137/95 90 %   03/08/23 0308 98.2 °F (36.8 °C) 86 18 119/62 91 %          Physical Exam:  Physical Exam  Vitals and nursing note reviewed. Constitutional:       Appearance: Normal appearance. Cardiovascular:      Rate and Rhythm: Normal rate and regular rhythm. Pulmonary:      Effort: Pulmonary effort is normal.      Breath sounds: Normal breath sounds. Abdominal:      General: Bowel sounds are normal. There is distension. Tenderness: There is no abdominal tenderness. Skin:     General: Skin is warm and dry. Neurological:      Mental Status: She is alert. Mental status is at baseline.         Lab Results:  Recent Results (from the past 24 hour(s))   GLUCOSE, POC    Collection Time: 03/07/23  2:35 PM   Result Value Ref Range    Glucose (POC) 92 65 - 100 mg/dL    Performed by [address] POC    Collection Time: 03/07/23  4:02 PM   Result Value Ref Range    Glucose (POC) 105 (H) 65 - 100 mg/dL    Performed by Frank Jones [facility]    RENAL FUNCTION PANEL Collection Time: 03/08/23  7:45 AM   Result Value Ref Range    Sodium 136 136 - 145 mmol/L    Potassium 4.6 3.5 - 5.1 mmol/L    Chloride 101 97 - 108 mmol/L    CO2 26 21 - 32 mmol/L    Anion gap 9 5 - 15 mmol/L    Glucose 121 (H) 65 - 100 mg/dL    BUN 47 (H) 6 - 20 mg/dL    Creatinine 8.17 (H) 0.55 - 1.02 mg/dL    BUN/Creatinine ratio 6 (L) 12 - 20      eGFR 5 (L) >60 ml/min/1.73m2    Calcium 8.4 (L) 8.5 - 10.1 mg/dL    Phosphorus 5.8 (H) 2.6 - 4.7 mg/dL    Albumin 2.8 (L) 3.5 - 5.0 g/dL      Results       Procedure Component Value Units Date/Time    MRSA SCREEN - PCR (NASAL) [[id number]] Collected: 03/07/23 0340    Order Status: Completed Specimen: Swab Updated: 03/07/23 0721     MRSA by PCR, Nasal Not Detected       COVID-19 WITH INFLUENZA A/B [[id number]] Collected: 03/06/23 2344    Order Status: Completed Specimen: Nasopharyngeal from Nasopharynx Updated: 03/07/23 0107     SARS-CoV-2 by PCR Not Detected        Comment: Not Detected results do not preclude SARS-CoV-2 infection and should not be used as the sole basis for patient management decisions. Results must be combined with clinical observations, patient history, and epidemiological information        Influenza A by PCR Not Detected        Influenza B by PCR Not Detected        Comment: Testing was performed using kalli Tiffanie SARS-CoV-2 and Influenza A/B nucleic acid assay. This test is a multiplex Real-Time Reverse Transcriptase Polymerase Chain Reaction (RT-PCR) based in vitro diagnostic test intended for the qualitative detection of nucleic acids from SARS-CoV-2, Influenza A, and Influenza B in nasopharyngeal and nasal swab specimens for use under the FDA's Emergency Use Authorization (EUA) only.    Fact sheet for Patients: FindDrives.pl Fact sheet   for Healthcare Providers: FindDrives.pl                  Diagnostic Images:  CT Results  (Last 48 hours)                 03/06/23 1742  CT ABD PELV WO CONT Final result    Impression:      1. Moderately distended gallbladder with sludge and calcified stone is again   seen. Consider abdominal ultrasound for further evaluation. 2. Bibasilar atelectasis with mild right and mild to moderate left pleural   effusions. 3. Please refer to above findings for complete details. Narrative:  INDICATION: Abdominal pain       COMPARISON: 7/28/2022       TECHNIQUE:    Thin axial images were obtained through the abdomen and pelvis without   intravenous iodinated contrast administration. Coronal and sagittal   reconstructions were generated. Oral contrast was not administered. CT dose   reduction was achieved through use of a standardized protocol tailored for this   examination and automatic exposure control for dose modulation. FINDINGS:       Lack of IV contrast limits evaluation of the solid abdominal organs. LIVER: No mass or biliary dilatation. GALLBLADDER: Moderately distended with moderate sludge and moderate size   gallstone. SPLEEN: Unremarkable   PANCREAS: No mass or ductal dilatation. ADRENALS: Unremarkable. KIDNEYS/URETERS: Atrophic bilateral kidneys with cortical scarring. Small   nonspecific low-density right renal lesion. No hydronephrosis    PERITONEUM: No abdominal lymphadenopathy or ascites. COLON: No dilatation or wall thickening. APPENDIX: Not visualized   SMALL BOWEL: No dilatation or wall thickening. STOMACH: Unremarkable. PELVIS: Small amount of pelvic free fluid. No pelvic lymphadenopathy   BONES: Moderate L5-S1 degenerative disc disease    VISUALIZED THORAX: Bibasilar atelectasis with mild right and mild to moderate   left pleural effusions   ADDITIONAL COMMENTS: Diffuse subcutaneous edema                    NM HEPATOBILIARY DUCT SCAN   Final Result   The gallbladder is not visualized despite imaging up to 120 minutes.    This is concerning for cystic duct obstruction although this could also   represent gallbladder dysfunction. XR CHEST PORT   Final Result      Mild pulmonary edema and small left pleural effusion, similar to 8/27/2022. US RUQ   Final Result      Acute calculus cholecystitis with dilated CBD. Small amount of ascites   Medical renal disease         CT ABD PELV WO CONT   Final Result      1. Moderately distended gallbladder with sludge and calcified stone is again   seen. Consider abdominal ultrasound for further evaluation. 2. Bibasilar atelectasis with mild right and mild to moderate left pleural   effusions. 3. Please refer to above findings for complete details.                  Current Medications:    Current Facility-Administered Medications:     HYDROmorphone (DILAUDID) syringe 0.2 mg, 0.2 mg, IntraVENous, Q4H PRN, Aaron Arnold MD    sevelamer carbonate (RENVELA) tab 2,400 mg, 2,400 mg, Oral, TID WITH MEALS, Aaron Arnold MD    traMADoL (ULTRAM) tablet 50 mg, 50 mg, Oral, Q12H PRN, Reynolds Aaron Minor MD, 50 mg at 03/08/23 0813    isosorbide mononitrate ER (IMDUR) tablet 60 mg, 60 mg, Oral, DAILY, Aaron Arnold MD, 60 mg at 03/07/23 1449    hydrALAZINE (APRESOLINE) 20 mg/mL injection 10 mg, 10 mg, IntraVENous, Q6H PRN, Kelly Oliva NP    hydrALAZINE (APRESOLINE) tablet 50 mg, 50 mg, Oral, TID, Kelly Oliva NP, 50 mg at 03/07/23 2116    piperacillin-tazobactam (ZOSYN) 3.375 g in 0.9% sodium chloride (MBP/ADV) 100 mL MBP, 3.375 g, IntraVENous, Q12H, Aaron Arnold MD, Last Rate: 25 mL/hr at 03/07/23 2245, 3.375 g at 03/07/23 2245    carvediloL (COREG) tablet 25 mg, 25 mg, Oral, BID WITH MEALS, Aaron Arnold MD, 25 mg at 03/07/23 1659    pantoprazole (PROTONIX) tablet 40 mg, 40 mg, Oral, ACB, Aaron Arnold MD    sacubitriL-valsartan (ENTRESTO) 49-51 mg tablet 1 Tablet, 1 Tablet, Oral, BID, Aaron Arnold MD, 1 Tablet at 03/07/23 2116    furosemide (LASIX) tablet 20 mg, 20 mg, Oral, DAILY, Aaron Arnold MD, 20 mg at 03/07/23 1449    diclofenac (VOLTAREN) 1 % topical gel 2 g, 2 g, Topical, Q6H PRN, Phoebe Arnold MD    cholecalciferol (VITAMIN D3) (1000 Units /25 mcg) tablet 2,000 Units, 2,000 Units, Oral, DAILY, Michael Arnold MD    sodium chloride (NS) flush 5-40 mL, 5-40 mL, IntraVENous, Q8H, Phoebe Arnold MD, 10 mL at 03/08/23 6588    sodium chloride (NS) flush 5-40 mL, 5-40 mL, IntraVENous, PRN, Phoebe Arnold MD    acetaminophen (TYLENOL) tablet 650 mg, 650 mg, Oral, Q6H PRN **OR** acetaminophen (TYLENOL) suppository 650 mg, 650 mg, Rectal, Q6H PRN, Phoebe Arnold MD    polyethylene glycol (MIRALAX) packet 17 g, 17 g, Oral, DAILY PRN, Phoebe Arnold MD    ondansetron (ZOFRAN ODT) tablet 4 mg, 4 mg, Oral, Q8H PRN **OR** ondansetron (ZOFRAN) injection 4 mg, 4 mg, IntraVENous, Q6H PRN, Phoebe Arnold MD, 4 mg at 03/07/23 9441    guaiFENesin (ROBITUSSIN) 100 mg/5 mL oral liquid 100 mg, 100 mg, Oral, Q4H PRN, Román Donnelly MD       ASSESSMENT:  Makenna Santana is a 70 y.o. female with PMH of ESRD, hypertension, diabetes and other medical problems as below. Poor historian with limited history provided. She presented to the ED with chief complaint of abdominal pain, nausea and vomiting for the past month. Abdominal pain mild-moderate, right-sided abdomen. Associated with nausea and vomiting, denies fever or chills. She also complain of coughing for the past 1 month, associated with some wheezing. No shortness of breath, orthopnea or LE swelling, no history of COPD or smoking. She missed the last hemodialysis session as she was not feeling well, last hemodialysis session was last Friday. Unsure who is her nephrologist.      In the ED, vital signs stable, afebrile. Abdominal ultrasound showed acute calculus cholecystitis. Chest X-ray showed mild pulmonary edema with small left pleural effusion.        # Acute cholecystitis  - IV zosyn empirically  - HIDA scan unable to visualize gallbladder  - Cardiology following, HIGH risk for surgery  - Clear liquid at midnight, NPO for breakfast.   - General surgery following    # Pulmonary edema  - Likely secondary to missed hemodialysis and CHF. - Chest X-ray to evaluate for pneumonia. - Screen for COVID-19 and influenza  - Hemodialysis per nephrology     # Hyperkalemia  - Secondary to ESRD. Hemodialysis   - 50mEq bicarb x 1.   - Continue to monitor   - Lokelma x1 dose     # ESRD  - Consulted nephrology for hemodialysis. Mon-Wed-Fri hemodialysis schedule. - Missed hemodialysis today, surgery recommended hemodialysis prior to surgery. - Continue home medications. PO sevelamer. # CHF  - Mildly fluid overload clinically, continue home dose PO lasix  - Continue home medications. PO entresto, BB. # CAD  - History of cabg in 2013  - Continue statin, BB. Hold plavix for possible surgery. # Essential hypertension   - Continue home medications. # HLP   - bp remains elevated  - Continue lasix, hydralazine, imdur, entresto   - add prn IV hydralazine      Full Code  Dvt Prophylaxis: none  GI Prophylaxis: protonix  Discharge barriers: surgical clearance, ? Need for cholecystectomy      Above treatment plan reviewed and discussed with patient in detail at bedside, all questions answered. Care Plan discussed with: Patient/Family    Total time spent with patient: 30 minutes.     Sarah Jenkins NP

## 2023-03-08 NOTE — PROGRESS NOTES
Renal Note    NAME:  Fatuma Garcia   :   1955   MRN:   [id number]     ATTENDING: Cyn Mchugh MD  PCP:  Yecenia Sparrow MD    Date/Time:  3/8/2023 12:11 PM      Subjective:   REQUESTING PHYSICIAN:  REASON FOR CONSULT:    esrd    Patient seen in the room. She was dialyzed this morning with 2.5 L fluid removal.  Denies any shortness of breath now    Past Medical History:   Diagnosis Date    Blind     CKD (chronic kidney disease)     HTN (hypertension)     Retinopathy     Type II or unspecified type diabetes mellitus with renal manifestations, uncontrolled(250.42) (HCC)       Past Surgical History:   Procedure Laterality Date    HX CORONARY ARTERY BYPASS GRAFT      HX GYN      hysterectomy      Social History     Tobacco Use    Smoking status: Never    Smokeless tobacco: Never   Substance Use Topics    Alcohol use: No      Family History   Problem Relation Age of Onset    Diabetes Maternal Aunt     Diabetes Maternal Grandmother        No Known Allergies   Prior to Admission medications    Medication Sig Start Date End Date Taking? Authorizing Provider   isosorbide dinitrate (ISORDIL) 10 mg tablet Take 1 Tablet by mouth three (3) times daily. 22   Doc Vega MD   diclofenac (VOLTAREN) 1 % gel apply to foot and hands as needed for arthritis pain 21   Provider, Historical   cholecalciferol (VITAMIN D3) (1000 Units /25 mcg) tablet Take 2,000 Units by mouth daily. Provider, Historical   furosemide (LASIX) 20 mg tablet Take 20 mg by mouth daily. Provider, Historical   rosuvastatin (CRESTOR) 20 mg tablet TAKE 1 TABLET NIGHTLY (STOP 40 MG DOSE) 21   Raffy Kim MD   amLODIPine (NORVASC) 10 mg tablet Take 10 mg by mouth daily. Provider, Historical   clopidogreL (PLAVIX) 75 mg tab Take 75 mg by mouth daily. Provider, Historical   sacubitriL-valsartan (Entresto) 49-51 mg tab tablet Take 1 Tab by mouth two (2) times a day.     Provider, Historical   insulin glargine (Lantus Solostar U-100 Insulin) 100 unit/mL (3 mL) inpn Inject 8 units daily. 20   Anthony Shepherd MD   hydrALAZINE (APRESOLINE) 25 mg tablet Take 25 mg by mouth three (3) times daily. 3/11/19   Provider, Historical   sevelamer carbonate (RENVELA) 800 mg tab tab Take  by mouth three (3) times daily. Provider, Historical   pantoprazole (PROTONIX) 40 mg tablet  17   Provider, Historical   acetaminophen (TYLENOL) 500 mg tablet Take  by mouth every six (6) hours as needed for Pain. Provider, Historical   docusate sodium (COLACE) 100 mg capsule Take 100 mg by mouth as needed for Constipation. Provider, Historical   carvediloL (COREG) 25 mg tablet Take 25 mg by mouth two (2) times daily (with meals). Provider, Historical   ferrous sulfate 325 mg (65 mg iron) tablet Take 325 mg by mouth as needed. Provider, Historical       REVIEW OF SYSTEMS:       Constitutional: Negative for chills and fever. HENT: Negative. Eyes: Negative. Respiratory: Negative. Cardiovascular: Negative. Gastrointestinal: Negative for abdominal pain and nausea. Skin: Negative. Neurological: Negative. Objective:   VITALS:    Visit Vitals  /60 (BP 1 Location: Left upper arm, BP Patient Position: At rest)   Pulse 84   Temp 98.2 °F (36.8 °C)   Resp 18   Ht 5' 6\" (1.676 m)   Wt 66 kg (145 lb 8.1 oz)   SpO2 94%   Breastfeeding No   BMI 23.48 kg/m²     Temp (24hrs), Av.2 °F (36.8 °C), Min:97.5 °F (36.4 °C), Max:98.7 °F (37.1 °C)      PHYSICAL EXAM:     General: NAD, alert, cooperative  Eyes: sclera anicteric  Oral Cavity: No thrush or ulcers  Neck: no JVD  Chest: Fair bilateral air entry. No Wheezing or Rhonchi. No rales.   Heart: normal sounds  Abdomen: soft and non tender   : no cohen  Lower Extremities: no edema  Skin: no rash  Neuro: intact, no focals  Psychiatric: non-depressed          LAB DATA REVIEWED:    Recent Results (from the past 24 hour(s))   GLUCOSE, POC    Collection Time: 23  4:02 PM Result Value Ref Range    Glucose (POC) 105 (H) 65 - 100 mg/dL    Performed by Mo Duncan Taina    RENAL FUNCTION PANEL    Collection Time: 03/08/23  7:45 AM   Result Value Ref Range    Sodium 136 136 - 145 mmol/L    Potassium 4.6 3.5 - 5.1 mmol/L    Chloride 101 97 - 108 mmol/L    CO2 26 21 - 32 mmol/L    Anion gap 9 5 - 15 mmol/L    Glucose 121 (H) 65 - 100 mg/dL    BUN 47 (H) 6 - 20 mg/dL    Creatinine 8.17 (H) 0.55 - 1.02 mg/dL    BUN/Creatinine ratio 6 (L) 12 - 20      eGFR 5 (L) >60 ml/min/1.73m2    Calcium 8.4 (L) 8.5 - 10.1 mg/dL    Phosphorus 5.8 (H) 2.6 - 4.7 mg/dL    Albumin 2.8 (L) 3.5 - 5.0 g/dL       Recommendations/Plan:     #1 end-stage renal disease  -On hemodialysis every MWF. Last outpatient dialysis was on today per schedule. She missed Monday  -She was dialyzed yesterday for volume overload. Dialyzed again today. 2.5 L fluid removal to put her back on the MWF schedule  Next dialysis on Friday  -Left arm AV fistula is current access    #2 shortness of breath  -Likely because of missed dialysis. Chest x-ray showed pulmonary edema  -Dialyzed yesterday with 3 L fluid removal.  2.5 L fluid removed today-Plan for dialysis again in a.m. #3 hyperkalemia  -Potassium was 6.5 on admission. Likely because of missed dialysis  -She was dialyzed with 2K bath yesterday and today. Potassium 4.6 today-Low potassium diet    #4 hypertension  -Blood pressure is well controlled with her home medications which I will continue    #5 abdominal pain  -Presented with abdominal pain for the past few weeks. Had abdominal ultrasound which showed possible acute cholecystitis  -Has been empirically started on Zosyn.   Surgery consulted and HIDA scan has been ordered        ________________________________________________________________________  Signed: Suhas Rice MD

## 2023-03-08 NOTE — PROGRESS NOTES
Hospitalist Progress Note         KIMBERLYN Cook, SHIRLEYP-C    Daily Progress Note: 3/8/2023      Subjective:   Subjective   Patient examined alert and oriented lying in bed. No concerns offered on examination    Review of Systems:   Review of Systems   Constitutional:  Negative for chills and fever. Eyes:  Negative for blurred vision and double vision. Respiratory:  Negative for cough. Cardiovascular:  Negative for chest pain. Gastrointestinal:  Positive for abdominal pain. Negative for nausea. Genitourinary:  Negative for dysuria. Musculoskeletal:  Negative for myalgias. Objective:   Objective      Vitals:  Patient Vitals for the past 12 hrs:   Temp Pulse Resp BP SpO2   03/08/23 1000 -- 76 18 (!) 148/70 --   03/08/23 0930 -- 72 18 (!) 156/72 --   03/08/23 0900 -- 74 18 (!) 153/70 --   03/08/23 0830 -- 79 16 135/63 --   03/08/23 0800 98.2 °F (36.8 °C) 81 18 (!) 145/66 --   03/08/23 0716 97.5 °F (36.4 °C) 78 18 (!) 137/95 90 %   03/08/23 0308 98.2 °F (36.8 °C) 86 18 119/62 91 %          Physical Exam:  Physical Exam  Vitals and nursing note reviewed. Constitutional:       Appearance: Normal appearance. Cardiovascular:      Rate and Rhythm: Normal rate and regular rhythm. Pulmonary:      Effort: Pulmonary effort is normal.      Breath sounds: Normal breath sounds. Abdominal:      General: Bowel sounds are normal. There is distension. Tenderness: There is no abdominal tenderness. Skin:     General: Skin is warm and dry. Neurological:      Mental Status: She is alert. Mental status is at baseline.         Lab Results:  Recent Results (from the past 24 hour(s))   GLUCOSE, POC    Collection Time: 03/07/23  2:35 PM   Result Value Ref Range    Glucose (POC) 92 65 - 100 mg/dL    Performed by [address] POC    Collection Time: 03/07/23  4:02 PM   Result Value Ref Range    Glucose (POC) 105 (H) 65 - 100 mg/dL    Performed by [facility]    RENAL FUNCTION PANEL Collection Time: 03/08/23  7:45 AM   Result Value Ref Range    Sodium 136 136 - 145 mmol/L    Potassium 4.6 3.5 - 5.1 mmol/L    Chloride 101 97 - 108 mmol/L    CO2 26 21 - 32 mmol/L    Anion gap 9 5 - 15 mmol/L    Glucose 121 (H) 65 - 100 mg/dL    BUN 47 (H) 6 - 20 mg/dL    Creatinine 8.17 (H) 0.55 - 1.02 mg/dL    BUN/Creatinine ratio 6 (L) 12 - 20      eGFR 5 (L) >60 ml/min/1.73m2    Calcium 8.4 (L) 8.5 - 10.1 mg/dL    Phosphorus 5.8 (H) 2.6 - 4.7 mg/dL    Albumin 2.8 (L) 3.5 - 5.0 g/dL      Results       Procedure Component Value Units Date/Time    MRSA SCREEN - PCR (NASAL) [[id number]] Collected: 03/07/23 0340    Order Status: Completed Specimen: Swab Updated: 03/07/23 0721     MRSA by PCR, Nasal Not Detected       COVID-19 WITH INFLUENZA A/B [[id number]] Collected: 03/06/23 2344    Order Status: Completed Specimen: Nasopharyngeal from Nasopharynx Updated: 03/07/23 0107     SARS-CoV-2 by PCR Not Detected        Comment: Not Detected results do not preclude SARS-CoV-2 infection and should not be used as the sole basis for patient management decisions. Results must be combined with clinical observations, patient history, and epidemiological information        Influenza A by PCR Not Detected        Influenza B by PCR Not Detected        Comment: Testing was performed using kalli Tiffanie SARS-CoV-2 and Influenza A/B nucleic acid assay. This test is a multiplex Real-Time Reverse Transcriptase Polymerase Chain Reaction (RT-PCR) based in vitro diagnostic test intended for the qualitative detection of nucleic acids from SARS-CoV-2, Influenza A, and Influenza B in nasopharyngeal and nasal swab specimens for use under the FDA's Emergency Use Authorization (EUA) only.    Fact sheet for Patients: FindDrives.pl Fact sheet   for Healthcare Providers: FindDrives[employer]pl                  Diagnostic Images:  CT Results  (Last 48 hours)                 03/06/23 1742  CT ABD PELV WO CONT Final result    Impression:      1. Moderately distended gallbladder with sludge and calcified stone is again   seen. Consider abdominal ultrasound for further evaluation. 2. Bibasilar atelectasis with mild right and mild to moderate left pleural   effusions. 3. Please refer to above findings for complete details. Narrative:  INDICATION: Abdominal pain       COMPARISON: 7/28/2022       TECHNIQUE:    Thin axial images were obtained through the abdomen and pelvis without   intravenous iodinated contrast administration. Coronal and sagittal   reconstructions were generated. Oral contrast was not administered. CT dose   reduction was achieved through use of a standardized protocol tailored for this   examination and automatic exposure control for dose modulation. FINDINGS:       Lack of IV contrast limits evaluation of the solid abdominal organs. LIVER: No mass or biliary dilatation. GALLBLADDER: Moderately distended with moderate sludge and moderate size   gallstone. SPLEEN: Unremarkable   PANCREAS: No mass or ductal dilatation. ADRENALS: Unremarkable. KIDNEYS/URETERS: Atrophic bilateral kidneys with cortical scarring. Small   nonspecific low-density right renal lesion. No hydronephrosis    PERITONEUM: No abdominal lymphadenopathy or ascites. COLON: No dilatation or wall thickening. APPENDIX: Not visualized   SMALL BOWEL: No dilatation or wall thickening. STOMACH: Unremarkable. PELVIS: Small amount of pelvic free fluid. No pelvic lymphadenopathy   BONES: Moderate L5-S1 degenerative disc disease    VISUALIZED THORAX: Bibasilar atelectasis with mild right and mild to moderate   left pleural effusions   ADDITIONAL COMMENTS: Diffuse subcutaneous edema                    NM HEPATOBILIARY DUCT SCAN   Final Result   The gallbladder is not visualized despite imaging up to 120 minutes.    This is concerning for cystic duct obstruction although this could also   represent gallbladder dysfunction. XR CHEST PORT   Final Result      Mild pulmonary edema and small left pleural effusion, similar to 8/27/2022. US RUQ   Final Result      Acute calculus cholecystitis with dilated CBD. Small amount of ascites   Medical renal disease         CT ABD PELV WO CONT   Final Result      1. Moderately distended gallbladder with sludge and calcified stone is again   seen. Consider abdominal ultrasound for further evaluation. 2. Bibasilar atelectasis with mild right and mild to moderate left pleural   effusions. 3. Please refer to above findings for complete details.                  Current Medications:    Current Facility-Administered Medications:     HYDROmorphone (DILAUDID) syringe 0.2 mg, 0.2 mg, IntraVENous, Q4H PRN, Nieves Arnold MD    sevelamer carbonate (RENVELA) tab 2,400 mg, 2,400 mg, Oral, TID WITH MEALS, Nieves Arnold MD    traMADoL (ULTRAM) tablet 50 mg, 50 mg, Oral, Q12H PRN, Nieves Rosado MD, 50 mg at 03/08/23 0813    isosorbide mononitrate ER (IMDUR) tablet 60 mg, 60 mg, Oral, DAILY, Nieves Arnold MD, 60 mg at 03/07/23 1449    hydrALAZINE (APRESOLINE) 20 mg/mL injection 10 mg, 10 mg, IntraVENous, Q6H PRN, Gregory Mirza NP    hydrALAZINE (APRESOLINE) tablet 50 mg, 50 mg, Oral, TID, Gregory Mirza NP, 50 mg at 03/07/23 2116    piperacillin-tazobactam (ZOSYN) 3.375 g in 0.9% sodium chloride (MBP/ADV) 100 mL MBP, 3.375 g, IntraVENous, Q12H, Nieves Arnold MD, Last Rate: 25 mL/hr at 03/07/23 2245, 3.375 g at 03/07/23 2245    carvediloL (COREG) tablet 25 mg, 25 mg, Oral, BID WITH MEALS, Nieves Arnold MD, 25 mg at 03/07/23 1659    pantoprazole (PROTONIX) tablet 40 mg, 40 mg, Oral, ACB, Nieves Arnold MD    sacubitriL-valsartan (ENTRESTO) 49-51 mg tablet 1 Tablet, 1 Tablet, Oral, BID, Nieves Arnold MD, 1 Tablet at 03/07/23 2116    furosemide (LASIX) tablet 20 mg, 20 mg, Oral, DAILY, Nieves Arnold MD, 20 mg at 03/07/23 1449    diclofenac (VOLTAREN) 1 % topical gel 2 g, 2 g, Topical, Q6H PRN, Tish Arnold MD    cholecalciferol (VITAMIN D3) (1000 Units /25 mcg) tablet 2,000 Units, 2,000 Units, Oral, DAILY, Michael Arnold MD    sodium chloride (NS) flush 5-40 mL, 5-40 mL, IntraVENous, Q8H, Tish Arnold MD, 10 mL at 03/08/23 1752    sodium chloride (NS) flush 5-40 mL, 5-40 mL, IntraVENous, PRN, Tish Arnold MD    acetaminophen (TYLENOL) tablet 650 mg, 650 mg, Oral, Q6H PRN **OR** acetaminophen (TYLENOL) suppository 650 mg, 650 mg, Rectal, Q6H PRN, Tish Arnold MD    polyethylene glycol (MIRALAX) packet 17 g, 17 g, Oral, DAILY PRN, Tish Arnold MD    ondansetron (ZOFRAN ODT) tablet 4 mg, 4 mg, Oral, Q8H PRN **OR** ondansetron (ZOFRAN) injection 4 mg, 4 mg, IntraVENous, Q6H PRN, Tish Arnold MD, 4 mg at 03/07/23 7662    guaiFENesin (ROBITUSSIN) 100 mg/5 mL oral liquid 100 mg, 100 mg, Oral, Q4H PRN, Liana Fraga MD       ASSESSMENT:  Valdez Dennis is a 70 y.o. female with PMH of ESRD, hypertension, diabetes and other medical problems as below. Poor historian with limited history provided. She presented to the ED with chief complaint of abdominal pain, nausea and vomiting for the past month. Abdominal pain mild-moderate, right-sided abdomen. Associated with nausea and vomiting, denies fever or chills. She also complain of coughing for the past 1 month, associated with some wheezing. No shortness of breath, orthopnea or LE swelling, no history of COPD or smoking. She missed the last hemodialysis session as she was not feeling well, last hemodialysis session was last Friday. Unsure who is her nephrologist.      In the ED, vital signs stable, afebrile. Abdominal ultrasound showed acute calculus cholecystitis. Chest X-ray showed mild pulmonary edema with small left pleural effusion.        # Acute cholecystitis  - IV zosyn empirically  - HIDA scan unable to visualize gallbladder  - Cardiology following, HIGH risk for surgery  - Clear liquid at midnight, NPO for breakfast.   - General surgery following    # Pulmonary edema  - Likely secondary to missed hemodialysis and CHF. - Chest X-ray to evaluate for pneumonia. - Screen for COVID-19 and influenza  - Hemodialysis per nephrology     # Hyperkalemia  - Secondary to ESRD. Hemodialysis   - 50mEq bicarb x 1.   - Continue to monitor   - Lokelma x1 dose     # ESRD  - Consulted nephrology for hemodialysis. Mon-Wed-Fri hemodialysis schedule. - Missed hemodialysis today, surgery recommended hemodialysis prior to surgery. - Continue home medications. PO sevelamer. # CHF  - Mildly fluid overload clinically, continue home dose PO lasix  - Continue home medications. PO entresto, BB. # CAD  - History of cabg in 2013  - Continue statin, BB. Hold plavix for possible surgery. # Essential hypertension   - Continue home medications. # HLP   - bp remains elevated  - Continue lasix, hydralazine, imdur, entresto   - add prn IV hydralazine      Full Code  Dvt Prophylaxis: none  GI Prophylaxis: protonix  Discharge barriers: surgical clearance, ? Need for cholecystectomy      Above treatment plan reviewed and discussed with patient in detail at bedside, all questions answered. Care Plan discussed with: Patient/Family    Total time spent with patient: 30 minutes.     Austin Torres NP

## 2023-03-08 NOTE — DIALYSIS
[facility] 15 pt tolerated 3h  dialysis well with 2.5 L fluid removal.    Report via SBAR to [facility].  [id number] tele tech Lulú Thomas notified as pt left department at [phone] for floor.

## 2023-03-09 VITALS
DIASTOLIC BLOOD PRESSURE: 60 MMHG | BODY MASS INDEX: 23.38 KG/M2 | RESPIRATION RATE: 18 BRPM | WEIGHT: 145.5 LBS | HEIGHT: 66 IN | OXYGEN SATURATION: 95 % | HEART RATE: 84 BPM | TEMPERATURE: 97.5 F | SYSTOLIC BLOOD PRESSURE: 139 MMHG

## 2023-03-09 LAB
ALBUMIN SERPL-MCNC: 2.7 G/DL (ref 3.5–5)
ANION GAP SERPL CALC-SCNC: 8 MMOL/L (ref 5–15)
BASOPHILS # BLD: 0.1 K/UL (ref 0–0.1)
BASOPHILS NFR BLD: 1 % (ref 0–1)
BUN SERPL-MCNC: 28 MG/DL (ref 6–20)
BUN/CREAT SERPL: 4 (ref 12–20)
CA-I BLD-MCNC: 8.2 MG/DL (ref 8.5–10.1)
CHLORIDE SERPL-SCNC: 100 MMOL/L (ref 97–108)
CO2 SERPL-SCNC: 27 MMOL/L (ref 21–32)
CREAT SERPL-MCNC: 6.25 MG/DL (ref 0.55–1.02)
DIFFERENTIAL METHOD BLD: ABNORMAL
EOSINOPHIL # BLD: 1.3 K/UL (ref 0–0.4)
EOSINOPHIL NFR BLD: 18 % (ref 0–7)
ERYTHROCYTE [DISTWIDTH] IN BLOOD BY AUTOMATED COUNT: 17.2 % (ref 11.5–14.5)
GLUCOSE SERPL-MCNC: 209 MG/DL (ref 65–100)
HCT VFR BLD AUTO: 34.2 % (ref 35–47)
HGB BLD-MCNC: 11.1 G/DL (ref 11.5–16)
IMM GRANULOCYTES # BLD AUTO: 0 K/UL (ref 0–0.04)
IMM GRANULOCYTES NFR BLD AUTO: 0 % (ref 0–0.5)
LYMPHOCYTES # BLD: 0.7 K/UL (ref 0.8–3.5)
LYMPHOCYTES NFR BLD: 10 % (ref 12–49)
MCH RBC QN AUTO: 31.8 PG (ref 26–34)
MCHC RBC AUTO-ENTMCNC: 32.5 G/DL (ref 30–36.5)
MCV RBC AUTO: 98 FL (ref 80–99)
MONOCYTES # BLD: 0.6 K/UL (ref 0–1)
MONOCYTES NFR BLD: 8 % (ref 5–13)
NEUTS SEG # BLD: 4.6 K/UL (ref 1.8–8)
NEUTS SEG NFR BLD: 63 % (ref 32–75)
NRBC # BLD: 0 K/UL (ref 0–0.01)
NRBC BLD-RTO: 0 PER 100 WBC
PHOSPHATE SERPL-MCNC: 4.1 MG/DL (ref 2.6–4.7)
PLATELET # BLD AUTO: 156 K/UL (ref 150–400)
PMV BLD AUTO: 12 FL (ref 8.9–12.9)
POTASSIUM SERPL-SCNC: 4 MMOL/L (ref 3.5–5.1)
POTASSIUM SERPL-SCNC: ABNORMAL MMOL/L (ref 3.5–5.1)
RBC # BLD AUTO: 3.49 M/UL (ref 3.8–5.2)
RBC MORPH BLD: ABNORMAL
RBC MORPH BLD: ABNORMAL
SODIUM SERPL-SCNC: 135 MMOL/L (ref 136–145)
WBC # BLD AUTO: 7.3 K/UL (ref 3.6–11)

## 2023-03-09 PROCEDURE — 74011250636 HC RX REV CODE- 250/636: Performed by: INTERNAL MEDICINE

## 2023-03-09 PROCEDURE — 36415 COLL VENOUS BLD VENIPUNCTURE: CPT

## 2023-03-09 PROCEDURE — 74011250637 HC RX REV CODE- 250/637: Performed by: NURSE PRACTITIONER

## 2023-03-09 PROCEDURE — 80069 RENAL FUNCTION PANEL: CPT

## 2023-03-09 PROCEDURE — G0378 HOSPITAL OBSERVATION PER HR: HCPCS

## 2023-03-09 PROCEDURE — 74011000250 HC RX REV CODE- 250: Performed by: INTERNAL MEDICINE

## 2023-03-09 PROCEDURE — 96376 TX/PRO/DX INJ SAME DRUG ADON: CPT

## 2023-03-09 PROCEDURE — 85025 COMPLETE CBC W/AUTO DIFF WBC: CPT

## 2023-03-09 PROCEDURE — 99232 SBSQ HOSP IP/OBS MODERATE 35: CPT | Performed by: SURGERY

## 2023-03-09 PROCEDURE — 74011000258 HC RX REV CODE- 258: Performed by: INTERNAL MEDICINE

## 2023-03-09 PROCEDURE — 74011250637 HC RX REV CODE- 250/637: Performed by: INTERNAL MEDICINE

## 2023-03-09 RX ORDER — AMOXICILLIN AND CLAVULANATE POTASSIUM 875; 125 MG/1; MG/1
1 TABLET, FILM COATED ORAL EVERY 12 HOURS
Qty: 10 TABLET | Refills: 0 | Status: SHIPPED | OUTPATIENT
Start: 2023-03-09 | End: 2023-03-14

## 2023-03-09 RX ADMIN — CARVEDILOL 25 MG: 12.5 TABLET, FILM COATED ORAL at 09:36

## 2023-03-09 RX ADMIN — Medication 2000 UNITS: at 09:36

## 2023-03-09 RX ADMIN — SODIUM CHLORIDE, PRESERVATIVE FREE 10 ML: 5 INJECTION INTRAVENOUS at 06:03

## 2023-03-09 RX ADMIN — SODIUM CHLORIDE, PRESERVATIVE FREE 10 ML: 5 INJECTION INTRAVENOUS at 13:19

## 2023-03-09 RX ADMIN — ISOSORBIDE MONONITRATE 60 MG: 30 TABLET, EXTENDED RELEASE ORAL at 09:36

## 2023-03-09 RX ADMIN — SEVELAMER CARBONATE 2400 MG: 800 TABLET, FILM COATED ORAL at 09:35

## 2023-03-09 RX ADMIN — HYDRALAZINE HYDROCHLORIDE 50 MG: 50 TABLET, FILM COATED ORAL at 09:35

## 2023-03-09 RX ADMIN — PIPERACILLIN AND TAZOBACTAM 3.38 G: 3; .375 INJECTION, POWDER, FOR SOLUTION INTRAVENOUS at 01:22

## 2023-03-09 RX ADMIN — PIPERACILLIN AND TAZOBACTAM 3.38 G: 3; .375 INJECTION, POWDER, FOR SOLUTION INTRAVENOUS at 12:19

## 2023-03-09 RX ADMIN — FUROSEMIDE 20 MG: 40 TABLET ORAL at 09:35

## 2023-03-09 RX ADMIN — SACUBITRIL AND VALSARTAN 1 TABLET: 49; 51 TABLET, FILM COATED ORAL at 09:35

## 2023-03-09 RX ADMIN — PANTOPRAZOLE SODIUM 40 MG: 40 TABLET, DELAYED RELEASE ORAL at 06:37

## 2023-03-09 RX ADMIN — SEVELAMER CARBONATE 2400 MG: 800 TABLET, FILM COATED ORAL at 12:19

## 2023-03-09 NOTE — PROGRESS NOTES
Pt stated she does not want surgery and not hurting and would like to eat. MD made aware. Waiting on orders.

## 2023-03-09 NOTE — DISCHARGE SUMMARY
Admit date: 3/6/2023   Admitting Provider: Diana Sauer MD    Discharge date: 3/9/2023  Discharging Provider: Jesse Nath MD      * Admission Diagnoses: Acute cholecystitis [K81.0]  Hyperkalemia [E87.5]    * Discharge Diagnoses:    Hospital Problems as of 3/9/2023 Date Reviewed: 4/24/2022            Codes Class Noted - Resolved POA    * (Principal) Acute cholecystitis ICD-10-CM: K81.0  ICD-9-CM: 575.0  3/6/2023 - Present Unknown        Hyperkalemia ICD-10-CM: E87.5  ICD-9-CM: 276.7  7/28/2022 - Present Unknown         * Hospital Course: Jesus Ponce is a 70 y.o. female with PMH of ESRD, hypertension, diabetes and other medical problems as below. Poor historian with limited history provided. She presented to the ED with chief complaint of abdominal pain, nausea and vomiting for the past month. Abdominal pain mild-moderate, right-sided abdomen. Associated with nausea and vomiting, denies fever or chills. She also complain of coughing for the past 1 month, associated with some wheezing. No shortness of breath, orthopnea or LE swelling, no history of COPD or smoking. She missed the last hemodialysis session as she was not feeling well, last hemodialysis session was last Friday. Unsure who is her nephrologist.      In the ED, vital signs stable, afebrile. Abdominal ultrasound showed acute calculus cholecystitis. Chest X-ray showed mild pulmonary edema with small left pleural effusion. HIDA scan unable to visualize gallbladder. General surgery following with reccs for surgery, patient refuses surgery stating her pain is better and requesting food. Patient tolerated diet well. Discharge pending general surgeon clearance.      * Procedures:   * No surgery found *      Consults: Cardiology and General Surgery    Significant Diagnostic Studies:   Recent Results (from the past 24 hour(s))   CBC WITH AUTOMATED DIFF    Collection Time: 03/08/23  5:42 PM   Result Value Ref Range    WBC 5.7 3.6 - 11.0 K/uL    RBC 3.18 (L) 3.80 - 5.20 M/uL    HGB 10.0 (L) 11.5 - 16.0 g/dL    HCT 31.4 (L) 35.0 - 47.0 %    MCV 98.7 80.0 - 99.0 FL    MCH 31.4 26.0 - 34.0 PG    MCHC 31.8 30.0 - 36.5 g/dL    RDW 17.0 (H) 11.5 - 14.5 %    PLATELET 681 (L) 460 - 400 K/uL    MPV 11.6 8.9 - 12.9 FL    NRBC 0.0 0.0  WBC    ABSOLUTE NRBC 0.00 0.00 - 0.01 K/uL    NEUTROPHILS 55 32 - 75 %    LYMPHOCYTES 16 12 - 49 %    MONOCYTES 9 5 - 13 %    EOSINOPHILS 19 (H) 0 - 7 %    BASOPHILS 1 0 - 1 %    IMMATURE GRANULOCYTES 0 0 - 0.5 %    ABS. NEUTROPHILS 3.1 1.8 - 8.0 K/UL    ABS. LYMPHOCYTES 0.9 0.8 - 3.5 K/UL    ABS. MONOCYTES 0.5 0.0 - 1.0 K/UL    ABS. EOSINOPHILS 1.1 (H) 0.0 - 0.4 K/UL    ABS. BASOPHILS 0.1 0.0 - 0.1 K/UL    ABS. IMM.  GRANS. 0.0 0.00 - 0.04 K/UL    DF AUTOMATED      RBC COMMENTS Polychromasia  1+        RBC COMMENTS Spherocytes  1+        RBC COMMENTS Microcytosis  2+       RENAL FUNCTION PANEL    Collection Time: 03/09/23  4:31 AM   Result Value Ref Range    Sodium 135 (L) 136 - 145 mmol/L    Potassium Hemolyzed, recollect requested 3.5 - 5.1 mmol/L    Chloride 100 97 - 108 mmol/L    CO2 27 21 - 32 mmol/L    Anion gap 8 5 - 15 mmol/L    Glucose 209 (H) 65 - 100 mg/dL    BUN 28 (H) 6 - 20 mg/dL    Creatinine 6.25 (H) 0.55 - 1.02 mg/dL    BUN/Creatinine ratio 4 (L) 12 - 20      eGFR 7 (L) >60 ml/min/1.73m2    Calcium 8.2 (L) 8.5 - 10.1 mg/dL    Phosphorus 4.1 2.6 - 4.7 mg/dL    Albumin 2.7 (L) 3.5 - 5.0 g/dL   CBC WITH AUTOMATED DIFF    Collection Time: 03/09/23  4:31 AM   Result Value Ref Range    WBC 7.3 3.6 - 11.0 K/uL    RBC 3.49 (L) 3.80 - 5.20 M/uL    HGB 11.1 (L) 11.5 - 16.0 g/dL    HCT 34.2 (L) 35.0 - 47.0 %    MCV 98.0 80.0 - 99.0 FL    MCH 31.8 26.0 - 34.0 PG    MCHC 32.5 30.0 - 36.5 g/dL    RDW 17.2 (H) 11.5 - 14.5 %    PLATELET 152 839 - 135 K/uL    MPV 12.0 8.9 - 12.9 FL    NRBC 0.0 0.0  WBC    ABSOLUTE NRBC 0.00 0.00 - 0.01 K/uL    NEUTROPHILS 63 32 - 75 %    LYMPHOCYTES 10 (L) 12 - 49 %    MONOCYTES 8 5 - 13 % EOSINOPHILS 18 (H) 0 - 7 %    BASOPHILS 1 0 - 1 %    IMMATURE GRANULOCYTES 0 0 - 0.5 %    ABS. NEUTROPHILS 4.6 1.8 - 8.0 K/UL    ABS. LYMPHOCYTES 0.7 (L) 0.8 - 3.5 K/UL    ABS. MONOCYTES 0.6 0.0 - 1.0 K/UL    ABS. EOSINOPHILS 1.3 (H) 0.0 - 0.4 K/UL    ABS. BASOPHILS 0.1 0.0 - 0.1 K/UL    ABS. IMM. GRANS. 0.0 0.00 - 0.04 K/UL    DF AUTOMATED      RBC COMMENTS Macrocytosis  1+        RBC COMMENTS Anisocytosis  2+           CT Results  (Last 48 hours)                    03/06/23 1742   CT ABD PELV WO CONT Final result     Impression:       1. Moderately distended gallbladder with sludge and calcified stone is again   seen. Consider abdominal ultrasound for further evaluation. 2. Bibasilar atelectasis with mild right and mild to moderate left pleural   effusions. 3. Please refer to above findings for complete details. Narrative:   INDICATION: Abdominal pain       COMPARISON: 7/28/2022       TECHNIQUE:    Thin axial images were obtained through the abdomen and pelvis without   intravenous iodinated contrast administration. Coronal and sagittal   reconstructions were generated. Oral contrast was not administered. CT dose   reduction was achieved through use of a standardized protocol tailored for this   examination and automatic exposure control for dose modulation. FINDINGS:       Lack of IV contrast limits evaluation of the solid abdominal organs. LIVER: No mass or biliary dilatation. GALLBLADDER: Moderately distended with moderate sludge and moderate size   gallstone. SPLEEN: Unremarkable   PANCREAS: No mass or ductal dilatation. ADRENALS: Unremarkable. KIDNEYS/URETERS: Atrophic bilateral kidneys with cortical scarring. Small   nonspecific low-density right renal lesion. No hydronephrosis    PERITONEUM: No abdominal lymphadenopathy or ascites. COLON: No dilatation or wall thickening. APPENDIX: Not visualized   SMALL BOWEL: No dilatation or wall thickening. STOMACH: Unremarkable. PELVIS: Small amount of pelvic free fluid. No pelvic lymphadenopathy   BONES: Moderate L5-S1 degenerative disc disease    VISUALIZED THORAX: Bibasilar atelectasis with mild right and mild to moderate   left pleural effusions   ADDITIONAL COMMENTS: Diffuse subcutaneous edema                       NM HEPATOBILIARY DUCT SCAN   Final Result   The gallbladder is not visualized despite imaging up to 120 minutes. This is concerning for cystic duct obstruction although this could also   represent gallbladder dysfunction. XR CHEST PORT   Final Result       Mild pulmonary edema and small left pleural effusion, similar to 8/27/2022. US RUQ   Final Result       Acute calculus cholecystitis with dilated CBD. Small amount of ascites   Medical renal disease           CT ABD PELV WO CONT   Final Result       1. Moderately distended gallbladder with sludge and calcified stone is again   seen. Consider abdominal ultrasound for further evaluation. 2. Bibasilar atelectasis with mild right and mild to moderate left pleural   effusions. 3. Please refer to above findings for complete details. Discharge Exam:  Vitals and nursing note reviewed. Constitutional:       Appearance: Normal appearance. Cardiovascular:      Rate and Rhythm: Normal rate and regular rhythm. Pulmonary:      Effort: Pulmonary effort is normal.      Breath sounds: Normal breath sounds. Abdominal:      General: Bowel sounds are normal. There is distension. Tenderness: There is no abdominal tenderness. Skin:     General: Skin is warm and dry. Neurological:      Mental Status: She is alert. Mental status is at baseline. * Discharge Condition: improved  * Disposition: Home    Discharge Medications:  Current Discharge Medication List        START taking these medications    Details   amoxicillin-clavulanate (Augmentin) 875-125 mg per tablet Take 1 Tablet by mouth every twelve (12) hours for 5 days.   Qty: 10 Tablet, Refills: 0  Start date: 3/9/2023, End date: 3/14/2023           CONTINUE these medications which have NOT CHANGED    Details   isosorbide dinitrate (ISORDIL) 10 mg tablet Take 1 Tablet by mouth three (3) times daily. Qty: 90 Tablet, Refills: 0      diclofenac (VOLTAREN) 1 % gel apply to foot and hands as needed for arthritis pain      cholecalciferol (VITAMIN D3) (1000 Units /25 mcg) tablet Take 2,000 Units by mouth daily. furosemide (LASIX) 20 mg tablet Take 20 mg by mouth daily. rosuvastatin (CRESTOR) 20 mg tablet TAKE 1 TABLET NIGHTLY (STOP 40 MG DOSE)  Qty: 90 Tablet, Refills: 3      amLODIPine (NORVASC) 10 mg tablet Take 10 mg by mouth daily. clopidogreL (PLAVIX) 75 mg tab Take 75 mg by mouth daily. sacubitriL-valsartan (Entresto) 49-51 mg tab tablet Take 1 Tab by mouth two (2) times a day. insulin glargine (Lantus Solostar U-100 Insulin) 100 unit/mL (3 mL) inpn Inject 8 units daily. Qty: 15 mL, Refills: 3      hydrALAZINE (APRESOLINE) 25 mg tablet Take 25 mg by mouth three (3) times daily. Refills: 1      sevelamer carbonate (RENVELA) 800 mg tab tab Take  by mouth three (3) times daily. pantoprazole (PROTONIX) 40 mg tablet     Associated Diagnoses: Type 2 diabetes mellitus with chronic kidney disease on chronic dialysis, with long-term current use of insulin (HCC)      acetaminophen (TYLENOL) 500 mg tablet Take  by mouth every six (6) hours as needed for Pain. Associated Diagnoses: Uncontrolled type 2 diabetes mellitus with diabetic nephropathy, with long-term current use of insulin      docusate sodium (COLACE) 100 mg capsule Take 100 mg by mouth as needed for Constipation. Associated Diagnoses: CHF (congestive heart failure) ([address] Ischemic cardiomyopathy; HTN (hypertension)      carvediloL (COREG) 25 mg tablet Take 25 mg by mouth two (2) times daily (with meals). Associated Diagnoses: CHF (congestive heart failure) ([address]  Ischemic cardiomyopathy; HTN (hypertension) ferrous sulfate 325 mg (65 mg iron) tablet Take 325 mg by mouth as needed. Associated Diagnoses: CHF (congestive heart failure) ([facility] [address] Ischemic cardiomyopathy; HTN (hypertension)             * Follow-up Care/Patient Instructions:   Activity: Activity as tolerated  Diet: Low fat, Low cholesterol  Wound Care: None needed    Follow-up Information       Follow up With Specialties Details Why Contact Info    Manuel Zhou MD Family Medicine Follow up in 1 week(s)  [address] [facility]  [phone]      Charbel Hooks MD Cardiovascular Disease Physician, Internal Medicine Physician, Interventional Cardiology Physician Follow up in 1 month(s)  314 [facility] [address] [place] [address] [address]      Bonny Vuong MD General Surgery Schedule an appointment as soon as possible for a visit As needed, If symptoms worsen [address] [place] [address]  [phone]            Time Spent: > 35 minutes    Signed:  Elzbieta Cobian MD  3/9/2023  12:20 PM

## 2023-03-09 NOTE — DISCHARGE SUMMARY
Admit date: 3/6/2023   Admitting Provider: Malcolm Chisholm MD    Discharge date: 3/9/2023  Discharging Provider: Richard Murray NP      * Admission Diagnoses: Acute cholecystitis [K81.0]  Hyperkalemia [E87.5]    * Discharge Diagnoses:    Hospital Problems as of 3/9/2023 Date Reviewed: 4/24/2022            Codes Class Noted - Resolved POA    * (Principal) Acute cholecystitis ICD-10-CM: K81.0  ICD-9-CM: 575.0  3/6/2023 - Present Unknown        Hyperkalemia ICD-10-CM: E87.5  ICD-9-CM: 276.7  7/28/2022 - Present Unknown           * Hospital Course: Brendan Wilson is a 70 y.o. female with PMH of ESRD, hypertension, diabetes and other medical problems as below. Poor historian with limited history provided. She presented to the ED with chief complaint of abdominal pain, nausea and vomiting for the past month. Abdominal pain mild-moderate, right-sided abdomen. Associated with nausea and vomiting, denies fever or chills. She also complain of coughing for the past 1 month, associated with some wheezing. No shortness of breath, orthopnea or LE swelling, no history of COPD or smoking. She missed the last hemodialysis session as she was not feeling well, last hemodialysis session was last Friday. Unsure who is her nephrologist.      In the ED, vital signs stable, afebrile. Abdominal ultrasound showed acute calculus cholecystitis. Chest X-ray showed mild pulmonary edema with small left pleural effusion. HIDA scan unable to visualize gallbladder. General surgery following with reccs for surgery, patient refuses surgery stating her pain is better and requesting food. Patient tolerated diet well. Discharge pending general surgeon clearance.      * Procedures:   * No surgery found *      Consults: Cardiology and General Surgery    Significant Diagnostic Studies:   Recent Results (from the past 24 hour(s))   CBC WITH AUTOMATED DIFF    Collection Time: 03/08/23  5:42 PM   Result Value Ref Range    WBC 5.7 3.6 - 11.0 K/uL    RBC 3.18 (L) 3.80 - 5.20 M/uL    HGB 10.0 (L) 11.5 - 16.0 g/dL    HCT 31.4 (L) 35.0 - 47.0 %    MCV 98.7 80.0 - 99.0 FL    MCH 31.4 26.0 - 34.0 PG    MCHC 31.8 30.0 - 36.5 g/dL    RDW 17.0 (H) 11.5 - 14.5 %    PLATELET 876 (L) 710 - 400 K/uL    MPV 11.6 8.9 - 12.9 FL    NRBC 0.0 0.0  WBC    ABSOLUTE NRBC 0.00 0.00 - 0.01 K/uL    NEUTROPHILS 55 32 - 75 %    LYMPHOCYTES 16 12 - 49 %    MONOCYTES 9 5 - 13 %    EOSINOPHILS 19 (H) 0 - 7 %    BASOPHILS 1 0 - 1 %    IMMATURE GRANULOCYTES 0 0 - 0.5 %    ABS. NEUTROPHILS 3.1 1.8 - 8.0 K/UL    ABS. LYMPHOCYTES 0.9 0.8 - 3.5 K/UL    ABS. MONOCYTES 0.5 0.0 - 1.0 K/UL    ABS. EOSINOPHILS 1.1 (H) 0.0 - 0.4 K/UL    ABS. BASOPHILS 0.1 0.0 - 0.1 K/UL    ABS. IMM.  GRANS. 0.0 0.00 - 0.04 K/UL    DF AUTOMATED      RBC COMMENTS Polychromasia  1+        RBC COMMENTS Spherocytes  1+        RBC COMMENTS Microcytosis  2+       RENAL FUNCTION PANEL    Collection Time: 03/09/23  4:31 AM   Result Value Ref Range    Sodium 135 (L) 136 - 145 mmol/L    Potassium Hemolyzed, recollect requested 3.5 - 5.1 mmol/L    Chloride 100 97 - 108 mmol/L    CO2 27 21 - 32 mmol/L    Anion gap 8 5 - 15 mmol/L    Glucose 209 (H) 65 - 100 mg/dL    BUN 28 (H) 6 - 20 mg/dL    Creatinine 6.25 (H) 0.55 - 1.02 mg/dL    BUN/Creatinine ratio 4 (L) 12 - 20      eGFR 7 (L) >60 ml/min/1.73m2    Calcium 8.2 (L) 8.5 - 10.1 mg/dL    Phosphorus 4.1 2.6 - 4.7 mg/dL    Albumin 2.7 (L) 3.5 - 5.0 g/dL   CBC WITH AUTOMATED DIFF    Collection Time: 03/09/23  4:31 AM   Result Value Ref Range    WBC 7.3 3.6 - 11.0 K/uL    RBC 3.49 (L) 3.80 - 5.20 M/uL    HGB 11.1 (L) 11.5 - 16.0 g/dL    HCT 34.2 (L) 35.0 - 47.0 %    MCV 98.0 80.0 - 99.0 FL    MCH 31.8 26.0 - 34.0 PG    MCHC 32.5 30.0 - 36.5 g/dL    RDW 17.2 (H) 11.5 - 14.5 %    PLATELET 293 117 - 324 K/uL    MPV 12.0 8.9 - 12.9 FL    NRBC 0.0 0.0  WBC    ABSOLUTE NRBC 0.00 0.00 - 0.01 K/uL    NEUTROPHILS 63 32 - 75 %    LYMPHOCYTES 10 (L) 12 - 49 %    MONOCYTES 8 5 - 13 % EOSINOPHILS 18 (H) 0 - 7 %    BASOPHILS 1 0 - 1 %    IMMATURE GRANULOCYTES 0 0 - 0.5 %    ABS. NEUTROPHILS 4.6 1.8 - 8.0 K/UL    ABS. LYMPHOCYTES 0.7 (L) 0.8 - 3.5 K/UL    ABS. MONOCYTES 0.6 0.0 - 1.0 K/UL    ABS. EOSINOPHILS 1.3 (H) 0.0 - 0.4 K/UL    ABS. BASOPHILS 0.1 0.0 - 0.1 K/UL    ABS. IMM. GRANS. 0.0 0.00 - 0.04 K/UL    DF AUTOMATED      RBC COMMENTS Macrocytosis  1+        RBC COMMENTS Anisocytosis  2+           CT Results  (Last 48 hours)                    03/06/23 1742   CT ABD PELV WO CONT Final result     Impression:       1. Moderately distended gallbladder with sludge and calcified stone is again   seen. Consider abdominal ultrasound for further evaluation. 2. Bibasilar atelectasis with mild right and mild to moderate left pleural   effusions. 3. Please refer to above findings for complete details. Narrative:   INDICATION: Abdominal pain       COMPARISON: 7/28/2022       TECHNIQUE:    Thin axial images were obtained through the abdomen and pelvis without   intravenous iodinated contrast administration. Coronal and sagittal   reconstructions were generated. Oral contrast was not administered. CT dose   reduction was achieved through use of a standardized protocol tailored for this   examination and automatic exposure control for dose modulation. FINDINGS:       Lack of IV contrast limits evaluation of the solid abdominal organs. LIVER: No mass or biliary dilatation. GALLBLADDER: Moderately distended with moderate sludge and moderate size   gallstone. SPLEEN: Unremarkable   PANCREAS: No mass or ductal dilatation. ADRENALS: Unremarkable. KIDNEYS/URETERS: Atrophic bilateral kidneys with cortical scarring. Small   nonspecific low-density right renal lesion. No hydronephrosis    PERITONEUM: No abdominal lymphadenopathy or ascites. COLON: No dilatation or wall thickening. APPENDIX: Not visualized   SMALL BOWEL: No dilatation or wall thickening. STOMACH: Unremarkable. PELVIS: Small amount of pelvic free fluid. No pelvic lymphadenopathy   BONES: Moderate L5-S1 degenerative disc disease    VISUALIZED THORAX: Bibasilar atelectasis with mild right and mild to moderate   left pleural effusions   ADDITIONAL COMMENTS: Diffuse subcutaneous edema                       NM HEPATOBILIARY DUCT SCAN   Final Result   The gallbladder is not visualized despite imaging up to 120 minutes. This is concerning for cystic duct obstruction although this could also   represent gallbladder dysfunction. XR CHEST PORT   Final Result       Mild pulmonary edema and small left pleural effusion, similar to 8/27/2022. US RUQ   Final Result       Acute calculus cholecystitis with dilated CBD. Small amount of ascites   Medical renal disease           CT ABD PELV WO CONT   Final Result       1. Moderately distended gallbladder with sludge and calcified stone is again   seen. Consider abdominal ultrasound for further evaluation. 2. Bibasilar atelectasis with mild right and mild to moderate left pleural   effusions. 3. Please refer to above findings for complete details. Discharge Exam:  Vitals and nursing note reviewed. Constitutional:       Appearance: Normal appearance. Cardiovascular:      Rate and Rhythm: Normal rate and regular rhythm. Pulmonary:      Effort: Pulmonary effort is normal.      Breath sounds: Normal breath sounds. Abdominal:      General: Bowel sounds are normal. There is distension. Tenderness: There is no abdominal tenderness. Skin:     General: Skin is warm and dry. Neurological:      Mental Status: She is alert. Mental status is at baseline. * Discharge Condition: improved  * Disposition: Home    Discharge Medications:  Current Discharge Medication List        START taking these medications    Details   amoxicillin-clavulanate (Augmentin) 875-125 mg per tablet Take 1 Tablet by mouth every twelve (12) hours for 5 days.   Qty: 10 Tablet, Refills: 0  Start date: 3/9/2023, End date: 3/14/2023           CONTINUE these medications which have NOT CHANGED    Details   isosorbide dinitrate (ISORDIL) 10 mg tablet Take 1 Tablet by mouth three (3) times daily. Qty: 90 Tablet, Refills: 0      diclofenac (VOLTAREN) 1 % gel apply to foot and hands as needed for arthritis pain      cholecalciferol (VITAMIN D3) (1000 Units /25 mcg) tablet Take 2,000 Units by mouth daily. furosemide (LASIX) 20 mg tablet Take 20 mg by mouth daily. rosuvastatin (CRESTOR) 20 mg tablet TAKE 1 TABLET NIGHTLY (STOP 40 MG DOSE)  Qty: 90 Tablet, Refills: 3      amLODIPine (NORVASC) 10 mg tablet Take 10 mg by mouth daily. clopidogreL (PLAVIX) 75 mg tab Take 75 mg by mouth daily. sacubitriL-valsartan (Entresto) 49-51 mg tab tablet Take 1 Tab by mouth two (2) times a day. insulin glargine (Lantus Solostar U-100 Insulin) 100 unit/mL (3 mL) inpn Inject 8 units daily. Qty: 15 mL, Refills: 3      hydrALAZINE (APRESOLINE) 25 mg tablet Take 25 mg by mouth three (3) times daily. Refills: 1      sevelamer carbonate (RENVELA) 800 mg tab tab Take  by mouth three (3) times daily. pantoprazole (PROTONIX) 40 mg tablet     Associated Diagnoses: Type 2 diabetes mellitus with chronic kidney disease on chronic dialysis, with long-term current use of insulin (HCC)      acetaminophen (TYLENOL) 500 mg tablet Take  by mouth every six (6) hours as needed for Pain. Associated Diagnoses: Uncontrolled type 2 diabetes mellitus with diabetic nephropathy, with long-term current use of insulin      docusate sodium (COLACE) 100 mg capsule Take 100 mg by mouth as needed for Constipation. Associated Diagnoses: CHF (congestive heart failure) ([address] Ischemic cardiomyopathy; HTN (hypertension)      carvediloL (COREG) 25 mg tablet Take 25 mg by mouth two (2) times daily (with meals). Associated Diagnoses: CHF (congestive heart failure) ([address]  Ischemic cardiomyopathy; HTN (hypertension) ferrous sulfate 325 mg (65 mg iron) tablet Take 325 mg by mouth as needed. Associated Diagnoses: CHF (congestive heart failure) ([facility] [address] Ischemic cardiomyopathy; HTN (hypertension)             * Follow-up Care/Patient Instructions:   Activity: Activity as tolerated  Diet: Low fat, Low cholesterol  Wound Care: None needed    Follow-up Information       Follow up With Specialties Details Why Contact Info    Salome Young MD Family Medicine Follow up in 1 week(s)  [address]  [address] [facility] [phone]      Mattie Garcia MD Cardiovascular Disease Physician, Internal Medicine Physician, Interventional Cardiology Physician Follow up in 1 month(s)  [address]  [phone]      Sadiq Lugo MD General Surgery Schedule an appointment as soon as possible for a visit As needed, If symptoms worsen [address] [place] [address]  [phone]            Time Spent: > 35 minutes    Signed:  Agus Randhawa NP  3/9/2023  12:20 PM

## 2023-03-09 NOTE — PROGRESS NOTES
Renal Note    NAME:  Kirk Charles   :   1955   MRN:   [id number]     ATTENDING: Jimbo Ramirez MD  PCP:  Antonio Maciel MD    Date/Time:  3/9/2023 12:11 PM      Subjective:   REQUESTING PHYSICIAN:  REASON FOR CONSULT:    esrd    Patient seen in the room. She was dialyzed yest with 2.5 L fluid removal.  Denies any shortness of breath now    Past Medical History:   Diagnosis Date    Blind     CKD (chronic kidney disease)     HTN (hypertension)     Retinopathy     Type II or unspecified type diabetes mellitus with renal manifestations, uncontrolled(250.42) (Formerly [facility])       Past Surgical History:   Procedure Laterality Date    HX CORONARY ARTERY BYPASS GRAFT      HX GYN      hysterectomy      Social History     Tobacco Use    Smoking status: Never    Smokeless tobacco: Never   Substance Use Topics    Alcohol use: No      Family History   Problem Relation Age of Onset    Diabetes Maternal Aunt     Diabetes Maternal Grandmother        No Known Allergies   Prior to Admission medications    Medication Sig Start Date End Date Taking? Authorizing Provider   amoxicillin-clavulanate (Augmentin) 875-125 mg per tablet Take 1 Tablet by mouth every twelve (12) hours for 5 days. 3/9/23 3/14/23 Yes Aspen Fonseca NP   isosorbide dinitrate (ISORDIL) 10 mg tablet Take 1 Tablet by mouth three (3) times daily. 22   Keerthi Urena MD   diclofenac (VOLTAREN) 1 % gel apply to foot and hands as needed for arthritis pain 21   Provider, Historical   cholecalciferol (VITAMIN D3) (1000 Units /25 mcg) tablet Take 2,000 Units by mouth daily. Provider, Historical   furosemide (LASIX) 20 mg tablet Take 20 mg by mouth daily. Provider, Historical   rosuvastatin (CRESTOR) 20 mg tablet TAKE 1 TABLET NIGHTLY (STOP 40 MG DOSE) 21   Reina Seymour MD   amLODIPine (NORVASC) 10 mg tablet Take 10 mg by mouth daily. Provider, Historical   clopidogreL (PLAVIX) 75 mg tab Take 75 mg by mouth daily.     Provider, Historical sacubitriL-valsartan (Entresto) 49-51 mg tab tablet Take 1 Tab by mouth two (2) times a day. Provider, Historical   insulin glargine (Lantus Solostar U-100 Insulin) 100 unit/mL (3 mL) inpn Inject 8 units daily. 20   Beau Bloch, MD   hydrALAZINE (APRESOLINE) 25 mg tablet Take 25 mg by mouth three (3) times daily. 3/11/19   Provider, Historical   sevelamer carbonate (RENVELA) 800 mg tab tab Take  by mouth three (3) times daily. Provider, Historical   pantoprazole (PROTONIX) 40 mg tablet  17   Provider, Historical   acetaminophen (TYLENOL) 500 mg tablet Take  by mouth every six (6) hours as needed for Pain. Provider, Historical   docusate sodium (COLACE) 100 mg capsule Take 100 mg by mouth as needed for Constipation. Provider, Historical   carvediloL (COREG) 25 mg tablet Take 25 mg by mouth two (2) times daily (with meals). Provider, Historical   ferrous sulfate 325 mg (65 mg iron) tablet Take 325 mg by mouth as needed. Provider, Historical       REVIEW OF SYSTEMS:       Constitutional: Negative for chills and fever. HENT: Negative. Eyes: Negative. Respiratory: Negative. Cardiovascular: Negative. Gastrointestinal: Negative for abdominal pain and nausea. Skin: Negative. Neurological: Negative. Objective:   VITALS:    Visit Vitals  BP (!) 152/68 (BP 1 Location: Left upper arm, BP Patient Position: Semi fowlers)   Pulse 78   Temp 97.7 °F (36.5 °C)   Resp 18   Ht 5' 6\" (1.676 m)   Wt 66 kg (145 lb 8.1 oz)   SpO2 98%   Breastfeeding No   BMI 23.48 kg/m²     Temp (24hrs), Av °F (36.7 °C), Min:97.7 °F (36.5 °C), Max:98.2 °F (36.8 °C)      PHYSICAL EXAM:     General: NAD, alert, cooperative  Eyes: sclera anicteric  Oral Cavity: No thrush or ulcers  Neck: no JVD  Chest: Fair bilateral air entry. No Wheezing or Rhonchi. No rales.   Heart: normal sounds  Abdomen: soft and non tender   : no cohen  Lower Extremities: no edema  Skin: no rash  Neuro: intact, no focals  Psychiatric: non-depressed          LAB DATA REVIEWED:    Recent Results (from the past 24 hour(s))   CBC WITH AUTOMATED DIFF    Collection Time: 03/08/23  5:42 PM   Result Value Ref Range    WBC 5.7 3.6 - 11.0 K/uL    RBC 3.18 (L) 3.80 - 5.20 M/uL    HGB 10.0 (L) 11.5 - 16.0 g/dL    HCT 31.4 (L) 35.0 - 47.0 %    MCV 98.7 80.0 - 99.0 FL    MCH 31.4 26.0 - 34.0 PG    MCHC 31.8 30.0 - 36.5 g/dL    RDW 17.0 (H) 11.5 - 14.5 %    PLATELET 546 (L) 597 - 400 K/uL    MPV 11.6 8.9 - 12.9 FL    NRBC 0.0 0.0  WBC    ABSOLUTE NRBC 0.00 0.00 - 0.01 K/uL    NEUTROPHILS 55 32 - 75 %    LYMPHOCYTES 16 12 - 49 %    MONOCYTES 9 5 - 13 %    EOSINOPHILS 19 (H) 0 - 7 %    BASOPHILS 1 0 - 1 %    IMMATURE GRANULOCYTES 0 0 - 0.5 %    ABS. NEUTROPHILS 3.1 1.8 - 8.0 K/UL    ABS. LYMPHOCYTES 0.9 0.8 - 3.5 K/UL    ABS. MONOCYTES 0.5 0.0 - 1.0 K/UL    ABS. EOSINOPHILS 1.1 (H) 0.0 - 0.4 K/UL    ABS. BASOPHILS 0.1 0.0 - 0.1 K/UL    ABS. IMM.  GRANS. 0.0 0.00 - 0.04 K/UL    DF AUTOMATED      RBC COMMENTS Polychromasia  1+        RBC COMMENTS Spherocytes  1+        RBC COMMENTS Microcytosis  2+       RENAL FUNCTION PANEL    Collection Time: 03/09/23  4:31 AM   Result Value Ref Range    Sodium 135 (L) 136 - 145 mmol/L    Potassium Hemolyzed, recollect requested 3.5 - 5.1 mmol/L    Chloride 100 97 - 108 mmol/L    CO2 27 21 - 32 mmol/L    Anion gap 8 5 - 15 mmol/L    Glucose 209 (H) 65 - 100 mg/dL    BUN 28 (H) 6 - 20 mg/dL    Creatinine 6.25 (H) 0.55 - 1.02 mg/dL    BUN/Creatinine ratio 4 (L) 12 - 20      eGFR 7 (L) >60 ml/min/1.73m2    Calcium 8.2 (L) 8.5 - 10.1 mg/dL    Phosphorus 4.1 2.6 - 4.7 mg/dL    Albumin 2.7 (L) 3.5 - 5.0 g/dL   CBC WITH AUTOMATED DIFF    Collection Time: 03/09/23  4:31 AM   Result Value Ref Range    WBC 7.3 3.6 - 11.0 K/uL    RBC 3.49 (L) 3.80 - 5.20 M/uL    HGB 11.1 (L) 11.5 - 16.0 g/dL    HCT 34.2 (L) 35.0 - 47.0 %    MCV 98.0 80.0 - 99.0 FL    MCH 31.8 26.0 - 34.0 PG    MCHC 32.5 30.0 - 36.5 g/dL    RDW 17. 2 (H) 11.5 - 14.5 %    PLATELET 146 767 - 131 K/uL    MPV 12.0 8.9 - 12.9 FL    NRBC 0.0 0.0  WBC    ABSOLUTE NRBC 0.00 0.00 - 0.01 K/uL    NEUTROPHILS 63 32 - 75 %    LYMPHOCYTES 10 (L) 12 - 49 %    MONOCYTES 8 5 - 13 %    EOSINOPHILS 18 (H) 0 - 7 %    BASOPHILS 1 0 - 1 %    IMMATURE GRANULOCYTES 0 0 - 0.5 %    ABS. NEUTROPHILS 4.6 1.8 - 8.0 K/UL    ABS. LYMPHOCYTES 0.7 (L) 0.8 - 3.5 K/UL    ABS. MONOCYTES 0.6 0.0 - 1.0 K/UL    ABS. EOSINOPHILS 1.3 (H) 0.0 - 0.4 K/UL    ABS. BASOPHILS 0.1 0.0 - 0.1 K/UL    ABS. IMM. GRANS. 0.0 0.00 - 0.04 K/UL    DF AUTOMATED      RBC COMMENTS Macrocytosis  1+        RBC COMMENTS Anisocytosis  2+       POTASSIUM    Collection Time: 03/09/23  1:22 PM   Result Value Ref Range    Potassium 4.0 3.5 - 5.1 mmol/L       Recommendations/Plan:     #1 end-stage renal disease  -On hemodialysis every MWF. Last outpatient dialysis was on today per schedule. She missed Monday  -She was dialyzed for the past 2 days. No need for hd today  -Next dialysis in am  -Left arm AV fistula is current access    #2 shortness of breath  -Likely because of missed dialysis. Chest x-ray showed pulmonary edema  -Dialyzed yesterday with 3 L fluid removal.  2.5 L fluid removed today-Plan for dialysis again in a.m. #3 hyperkalemia  -Potassium was 6.5 on admission. Likely because of missed dialysis  -She was dialyzed with 2K bath yesterday   -Potassium 4.6 today-Low potassium diet    #4 hypertension  -Blood pressure is well controlled with her home medications which I will continue    #5 abdominal pain  -Presented with abdominal pain for the past few weeks. Had abdominal ultrasound which showed possible acute cholecystitis  -Has been empirically started on Zosyn.   Surgery consulted and HIDA scan has been ordered        ________________________________________________________________________  Signed: Merritt Trejo MD

## 2023-03-09 NOTE — PROGRESS NOTES
Problem: Falls - Risk of  Goal: *Absence of Falls  Description: Document Justin Mcbride Fall Risk and appropriate interventions in the flowsheet.   Outcome: Progressing Towards Goal  Note: Fall Risk Interventions:                                Problem: Patient Education: Go to Patient Education Activity  Goal: Patient/Family Education  Outcome: Progressing Towards Goal     Problem: Pain  Goal: *Control of Pain  Outcome: Progressing Towards Goal

## 2023-03-09 NOTE — PROGRESS NOTES
Discharge plan of care/case management plan validated with provider discharge order. Pt given discharge instructions, doctor education and follow up appointments, education on possible emergency, diet, and actively. Pt verbalized understanding, questions asked and answered by discharge nurse. IV removed intact by student nurse, belongings returned to pt, pt wheeled down to family in car.

## 2023-03-09 NOTE — PROGRESS NOTES
Renal Note    NAME:  Frank Laws   :   1955   MRN:   [id number]     ATTENDING: Trav Govea MD  PCP:  Shantell Macdonald MD    Date/Time:  3/9/2023 12:11 PM      Subjective:   REQUESTING PHYSICIAN:  REASON FOR CONSULT:    esrd    Patient seen in the room. She was dialyzed yest with 2.5 L fluid removal.  Denies any shortness of breath now    Past Medical History:   Diagnosis Date    Blind     CKD (chronic kidney disease)     HTN (hypertension)     Retinopathy     Type II or unspecified type diabetes mellitus with renal manifestations, uncontrolled(250.42) ([facility])       Past Surgical History:   Procedure Laterality Date    HX CORONARY ARTERY BYPASS GRAFT      HX GYN      hysterectomy      Social History     Tobacco Use    Smoking status: Never    Smokeless tobacco: Never   Substance Use Topics    Alcohol use: No      Family History   Problem Relation Age of Onset    Diabetes Maternal Aunt     Diabetes Maternal Grandmother        No Known Allergies   Prior to Admission medications    Medication Sig Start Date End Date Taking? Authorizing Provider   amoxicillin-clavulanate (Augmentin) 875-125 mg per tablet Take 1 Tablet by mouth every twelve (12) hours for 5 days. 3/9/23 3/14/23 Yes Arabella Ramos, SHANI   isosorbide dinitrate (ISORDIL) 10 mg tablet Take 1 Tablet by mouth three (3) times daily. 22   Mary Ambrocio MD   diclofenac (VOLTAREN) 1 % gel apply to foot and hands as needed for arthritis pain 21   Provider, Historical   cholecalciferol (VITAMIN D3) (1000 Units /25 mcg) tablet Take 2,000 Units by mouth daily. Provider, Historical   furosemide (LASIX) 20 mg tablet Take 20 mg by mouth daily. Provider, Historical   rosuvastatin (CRESTOR) 20 mg tablet TAKE 1 TABLET NIGHTLY (STOP 40 MG DOSE) 21   Jp Gray MD   amLODIPine (NORVASC) 10 mg tablet Take 10 mg by mouth daily. Provider, Historical   clopidogreL (PLAVIX) 75 mg tab Take 75 mg by mouth daily.     Provider, Historical sacubitriL-valsartan (Entresto) 49-51 mg tab tablet Take 1 Tab by mouth two (2) times a day. Provider, Historical   insulin glargine (Lantus Solostar U-100 Insulin) 100 unit/mL (3 mL) inpn Inject 8 units daily. 20   Alissa Gomez MD   hydrALAZINE (APRESOLINE) 25 mg tablet Take 25 mg by mouth three (3) times daily. 3/11/19   Provider, Historical   sevelamer carbonate (RENVELA) 800 mg tab tab Take  by mouth three (3) times daily. Provider, Historical   pantoprazole (PROTONIX) 40 mg tablet  17   Provider, Historical   acetaminophen (TYLENOL) 500 mg tablet Take  by mouth every six (6) hours as needed for Pain. Provider, Historical   docusate sodium (COLACE) 100 mg capsule Take 100 mg by mouth as needed for Constipation. Provider, Historical   carvediloL (COREG) 25 mg tablet Take 25 mg by mouth two (2) times daily (with meals). Provider, Historical   ferrous sulfate 325 mg (65 mg iron) tablet Take 325 mg by mouth as needed. Provider, Historical       REVIEW OF SYSTEMS:       Constitutional: Negative for chills and fever. HENT: Negative. Eyes: Negative. Respiratory: Negative. Cardiovascular: Negative. Gastrointestinal: Negative for abdominal pain and nausea. Skin: Negative. Neurological: Negative. Objective:   VITALS:    Visit Vitals  BP (!) 152/68 (BP 1 Location: Left upper arm, BP Patient Position: Semi fowlers)   Pulse 78   Temp 97.7 °F (36.5 °C)   Resp 18   Ht 5' 6\" (1.676 m)   Wt 66 kg (145 lb 8.1 oz)   SpO2 98%   Breastfeeding No   BMI 23.48 kg/m²     Temp (24hrs), Av °F (36.7 °C), Min:97.7 °F (36.5 °C), Max:98.2 °F (36.8 °C)      PHYSICAL EXAM:     General: NAD, alert, cooperative  Eyes: sclera anicteric  Oral Cavity: No thrush or ulcers  Neck: no JVD  Chest: Fair bilateral air entry. No Wheezing or Rhonchi. No rales.   Heart: normal sounds  Abdomen: soft and non tender   : no cohen  Lower Extremities: no edema  Skin: no rash  Neuro: intact, no focals  Psychiatric: non-depressed          LAB DATA REVIEWED:    Recent Results (from the past 24 hour(s))   CBC WITH AUTOMATED DIFF    Collection Time: 03/08/23  5:42 PM   Result Value Ref Range    WBC 5.7 3.6 - 11.0 K/uL    RBC 3.18 (L) 3.80 - 5.20 M/uL    HGB 10.0 (L) 11.5 - 16.0 g/dL    HCT 31.4 (L) 35.0 - 47.0 %    MCV 98.7 80.0 - 99.0 FL    MCH 31.4 26.0 - 34.0 PG    MCHC 31.8 30.0 - 36.5 g/dL    RDW 17.0 (H) 11.5 - 14.5 %    PLATELET 861 (L) 492 - 400 K/uL    MPV 11.6 8.9 - 12.9 FL    NRBC 0.0 0.0  WBC    ABSOLUTE NRBC 0.00 0.00 - 0.01 K/uL    NEUTROPHILS 55 32 - 75 %    LYMPHOCYTES 16 12 - 49 %    MONOCYTES 9 5 - 13 %    EOSINOPHILS 19 (H) 0 - 7 %    BASOPHILS 1 0 - 1 %    IMMATURE GRANULOCYTES 0 0 - 0.5 %    ABS. NEUTROPHILS 3.1 1.8 - 8.0 K/UL    ABS. LYMPHOCYTES 0.9 0.8 - 3.5 K/UL    ABS. MONOCYTES 0.5 0.0 - 1.0 K/UL    ABS. EOSINOPHILS 1.1 (H) 0.0 - 0.4 K/UL    ABS. BASOPHILS 0.1 0.0 - 0.1 K/UL    ABS. IMM.  GRANS. 0.0 0.00 - 0.04 K/UL    DF AUTOMATED      RBC COMMENTS Polychromasia  1+        RBC COMMENTS Spherocytes  1+        RBC COMMENTS Microcytosis  2+       RENAL FUNCTION PANEL    Collection Time: 03/09/23  4:31 AM   Result Value Ref Range    Sodium 135 (L) 136 - 145 mmol/L    Potassium Hemolyzed, recollect requested 3.5 - 5.1 mmol/L    Chloride 100 97 - 108 mmol/L    CO2 27 21 - 32 mmol/L    Anion gap 8 5 - 15 mmol/L    Glucose 209 (H) 65 - 100 mg/dL    BUN 28 (H) 6 - 20 mg/dL    Creatinine 6.25 (H) 0.55 - 1.02 mg/dL    BUN/Creatinine ratio 4 (L) 12 - 20      eGFR 7 (L) >60 ml/min/1.73m2    Calcium 8.2 (L) 8.5 - 10.1 mg/dL    Phosphorus 4.1 2.6 - 4.7 mg/dL    Albumin 2.7 (L) 3.5 - 5.0 g/dL   CBC WITH AUTOMATED DIFF    Collection Time: 03/09/23  4:31 AM   Result Value Ref Range    WBC 7.3 3.6 - 11.0 K/uL    RBC 3.49 (L) 3.80 - 5.20 M/uL    HGB 11.1 (L) 11.5 - 16.0 g/dL    HCT 34.2 (L) 35.0 - 47.0 %    MCV 98.0 80.0 - 99.0 FL    MCH 31.8 26.0 - 34.0 PG    MCHC 32.5 30.0 - 36.5 g/dL    RDW 17. 2 (H) 11.5 - 14.5 %    PLATELET 835 500 - 696 K/uL    MPV 12.0 8.9 - 12.9 FL    NRBC 0.0 0.0  WBC    ABSOLUTE NRBC 0.00 0.00 - 0.01 K/uL    NEUTROPHILS 63 32 - 75 %    LYMPHOCYTES 10 (L) 12 - 49 %    MONOCYTES 8 5 - 13 %    EOSINOPHILS 18 (H) 0 - 7 %    BASOPHILS 1 0 - 1 %    IMMATURE GRANULOCYTES 0 0 - 0.5 %    ABS. NEUTROPHILS 4.6 1.8 - 8.0 K/UL    ABS. LYMPHOCYTES 0.7 (L) 0.8 - 3.5 K/UL    ABS. MONOCYTES 0.6 0.0 - 1.0 K/UL    ABS. EOSINOPHILS 1.3 (H) 0.0 - 0.4 K/UL    ABS. BASOPHILS 0.1 0.0 - 0.1 K/UL    ABS. IMM. GRANS. 0.0 0.00 - 0.04 K/UL    DF AUTOMATED      RBC COMMENTS Macrocytosis  1+        RBC COMMENTS Anisocytosis  2+       POTASSIUM    Collection Time: 03/09/23  1:22 PM   Result Value Ref Range    Potassium 4.0 3.5 - 5.1 mmol/L       Recommendations/Plan:     #1 end-stage renal disease  -On hemodialysis every MWF. Last outpatient dialysis was on today per schedule. She missed Monday  -She was dialyzed for the past 2 days. No need for hd today  -Next dialysis in am  -Left arm AV fistula is current access    #2 shortness of breath  -Likely because of missed dialysis. Chest x-ray showed pulmonary edema  -Dialyzed yesterday with 3 L fluid removal.  2.5 L fluid removed today-Plan for dialysis again in a.m. #3 hyperkalemia  -Potassium was 6.5 on admission. Likely because of missed dialysis  -She was dialyzed with 2K bath yesterday   -Potassium 4.6 today-Low potassium diet    #4 hypertension  -Blood pressure is well controlled with her home medications which I will continue    #5 abdominal pain  -Presented with abdominal pain for the past few weeks. Had abdominal ultrasound which showed possible acute cholecystitis  -Has been empirically started on Zosyn.   Surgery consulted and HIDA scan has been ordered        ________________________________________________________________________  Signed: Jayne Moreno MD

## 2023-03-09 NOTE — DISCHARGE SUMMARY
Admit date: 3/6/2023   Admitting Provider: Phyllis Diana MD    Discharge date: 3/9/2023  Discharging Provider: Jorge Luis Rodriguez MD      * Admission Diagnoses: Acute cholecystitis [K81.0]  Hyperkalemia [E87.5]    * Discharge Diagnoses:    Hospital Problems as of 3/9/2023 Date Reviewed: 4/24/2022            Codes Class Noted - Resolved POA    * (Principal) Acute cholecystitis ICD-10-CM: K81.0  ICD-9-CM: 575.0  3/6/2023 - Present Unknown        Hyperkalemia ICD-10-CM: E87.5  ICD-9-CM: 276.7  7/28/2022 - Present Unknown         * Hospital Course: Enedelia Dooley is a 70 y.o. female with PMH of ESRD, hypertension, diabetes and other medical problems as below. Poor historian with limited history provided. She presented to the ED with chief complaint of abdominal pain, nausea and vomiting for the past month. Abdominal pain mild-moderate, right-sided abdomen. Associated with nausea and vomiting, denies fever or chills. She also complain of coughing for the past 1 month, associated with some wheezing. No shortness of breath, orthopnea or LE swelling, no history of COPD or smoking. She missed the last hemodialysis session as she was not feeling well, last hemodialysis session was last Friday. Unsure who is her nephrologist.      In the ED, vital signs stable, afebrile. Abdominal ultrasound showed acute calculus cholecystitis. Chest X-ray showed mild pulmonary edema with small left pleural effusion. HIDA scan unable to visualize gallbladder. General surgery following with reccs for surgery, patient refuses surgery stating her pain is better and requesting food. Patient tolerated diet well. Discharge pending general surgeon clearance.      * Procedures:   * No surgery found *      Consults: Cardiology and General Surgery    Significant Diagnostic Studies:   Recent Results (from the past 24 hour(s))   CBC WITH AUTOMATED DIFF    Collection Time: 03/08/23  5:42 PM   Result Value Ref Range    WBC 5.7 3.6 - 11.0 K/uL    RBC 3.18 (L) 3.80 - 5.20 M/uL    HGB 10.0 (L) 11.5 - 16.0 g/dL    HCT 31.4 (L) 35.0 - 47.0 %    MCV 98.7 80.0 - 99.0 FL    MCH 31.4 26.0 - 34.0 PG    MCHC 31.8 30.0 - 36.5 g/dL    RDW 17.0 (H) 11.5 - 14.5 %    PLATELET 460 (L) 112 - 400 K/uL    MPV 11.6 8.9 - 12.9 FL    NRBC 0.0 0.0  WBC    ABSOLUTE NRBC 0.00 0.00 - 0.01 K/uL    NEUTROPHILS 55 32 - 75 %    LYMPHOCYTES 16 12 - 49 %    MONOCYTES 9 5 - 13 %    EOSINOPHILS 19 (H) 0 - 7 %    BASOPHILS 1 0 - 1 %    IMMATURE GRANULOCYTES 0 0 - 0.5 %    ABS. NEUTROPHILS 3.1 1.8 - 8.0 K/UL    ABS. LYMPHOCYTES 0.9 0.8 - 3.5 K/UL    ABS. MONOCYTES 0.5 0.0 - 1.0 K/UL    ABS. EOSINOPHILS 1.1 (H) 0.0 - 0.4 K/UL    ABS. BASOPHILS 0.1 0.0 - 0.1 K/UL    ABS. IMM.  GRANS. 0.0 0.00 - 0.04 K/UL    DF AUTOMATED      RBC COMMENTS Polychromasia  1+        RBC COMMENTS Spherocytes  1+        RBC COMMENTS Microcytosis  2+       RENAL FUNCTION PANEL    Collection Time: 03/09/23  4:31 AM   Result Value Ref Range    Sodium 135 (L) 136 - 145 mmol/L    Potassium Hemolyzed, recollect requested 3.5 - 5.1 mmol/L    Chloride 100 97 - 108 mmol/L    CO2 27 21 - 32 mmol/L    Anion gap 8 5 - 15 mmol/L    Glucose 209 (H) 65 - 100 mg/dL    BUN 28 (H) 6 - 20 mg/dL    Creatinine 6.25 (H) 0.55 - 1.02 mg/dL    BUN/Creatinine ratio 4 (L) 12 - 20      eGFR 7 (L) >60 ml/min/1.73m2    Calcium 8.2 (L) 8.5 - 10.1 mg/dL    Phosphorus 4.1 2.6 - 4.7 mg/dL    Albumin 2.7 (L) 3.5 - 5.0 g/dL   CBC WITH AUTOMATED DIFF    Collection Time: 03/09/23  4:31 AM   Result Value Ref Range    WBC 7.3 3.6 - 11.0 K/uL    RBC 3.49 (L) 3.80 - 5.20 M/uL    HGB 11.1 (L) 11.5 - 16.0 g/dL    HCT 34.2 (L) 35.0 - 47.0 %    MCV 98.0 80.0 - 99.0 FL    MCH 31.8 26.0 - 34.0 PG    MCHC 32.5 30.0 - 36.5 g/dL    RDW 17.2 (H) 11.5 - 14.5 %    PLATELET 262 382 - 039 K/uL    MPV 12.0 8.9 - 12.9 FL    NRBC 0.0 0.0  WBC    ABSOLUTE NRBC 0.00 0.00 - 0.01 K/uL    NEUTROPHILS 63 32 - 75 %    LYMPHOCYTES 10 (L) 12 - 49 %    MONOCYTES 8 5 - 13 % EOSINOPHILS 18 (H) 0 - 7 %    BASOPHILS 1 0 - 1 %    IMMATURE GRANULOCYTES 0 0 - 0.5 %    ABS. NEUTROPHILS 4.6 1.8 - 8.0 K/UL    ABS. LYMPHOCYTES 0.7 (L) 0.8 - 3.5 K/UL    ABS. MONOCYTES 0.6 0.0 - 1.0 K/UL    ABS. EOSINOPHILS 1.3 (H) 0.0 - 0.4 K/UL    ABS. BASOPHILS 0.1 0.0 - 0.1 K/UL    ABS. IMM. GRANS. 0.0 0.00 - 0.04 K/UL    DF AUTOMATED      RBC COMMENTS Macrocytosis  1+        RBC COMMENTS Anisocytosis  2+           CT Results  (Last 48 hours)                    03/06/23 1742   CT ABD PELV WO CONT Final result     Impression:       1. Moderately distended gallbladder with sludge and calcified stone is again   seen. Consider abdominal ultrasound for further evaluation. 2. Bibasilar atelectasis with mild right and mild to moderate left pleural   effusions. 3. Please refer to above findings for complete details. Narrative:   INDICATION: Abdominal pain       COMPARISON: 7/28/2022       TECHNIQUE:    Thin axial images were obtained through the abdomen and pelvis without   intravenous iodinated contrast administration. Coronal and sagittal   reconstructions were generated. Oral contrast was not administered. CT dose   reduction was achieved through use of a standardized protocol tailored for this   examination and automatic exposure control for dose modulation. FINDINGS:       Lack of IV contrast limits evaluation of the solid abdominal organs. LIVER: No mass or biliary dilatation. GALLBLADDER: Moderately distended with moderate sludge and moderate size   gallstone. SPLEEN: Unremarkable   PANCREAS: No mass or ductal dilatation. ADRENALS: Unremarkable. KIDNEYS/URETERS: Atrophic bilateral kidneys with cortical scarring. Small   nonspecific low-density right renal lesion. No hydronephrosis    PERITONEUM: No abdominal lymphadenopathy or ascites. COLON: No dilatation or wall thickening. APPENDIX: Not visualized   SMALL BOWEL: No dilatation or wall thickening. STOMACH: Unremarkable. PELVIS: Small amount of pelvic free fluid. No pelvic lymphadenopathy   BONES: Moderate L5-S1 degenerative disc disease    VISUALIZED THORAX: Bibasilar atelectasis with mild right and mild to moderate   left pleural effusions   ADDITIONAL COMMENTS: Diffuse subcutaneous edema                       NM HEPATOBILIARY DUCT SCAN   Final Result   The gallbladder is not visualized despite imaging up to 120 minutes. This is concerning for cystic duct obstruction although this could also   represent gallbladder dysfunction. XR CHEST PORT   Final Result       Mild pulmonary edema and small left pleural effusion, similar to 8/27/2022. US RUQ   Final Result       Acute calculus cholecystitis with dilated CBD. Small amount of ascites   Medical renal disease           CT ABD PELV WO CONT   Final Result       1. Moderately distended gallbladder with sludge and calcified stone is again   seen. Consider abdominal ultrasound for further evaluation. 2. Bibasilar atelectasis with mild right and mild to moderate left pleural   effusions. 3. Please refer to above findings for complete details. Discharge Exam:  Vitals and nursing note reviewed. Constitutional:       Appearance: Normal appearance. Cardiovascular:      Rate and Rhythm: Normal rate and regular rhythm. Pulmonary:      Effort: Pulmonary effort is normal.      Breath sounds: Normal breath sounds. Abdominal:      General: Bowel sounds are normal. There is distension. Tenderness: There is no abdominal tenderness. Skin:     General: Skin is warm and dry. Neurological:      Mental Status: She is alert. Mental status is at baseline. * Discharge Condition: improved  * Disposition: Home    Discharge Medications:  Current Discharge Medication List        START taking these medications    Details   amoxicillin-clavulanate (Augmentin) 875-125 mg per tablet Take 1 Tablet by mouth every twelve (12) hours for 5 days.   Qty: 10 Tablet, Refills: 0  Start date: 3/9/2023, End date: 3/14/2023           CONTINUE these medications which have NOT CHANGED    Details   isosorbide dinitrate (ISORDIL) 10 mg tablet Take 1 Tablet by mouth three (3) times daily. Qty: 90 Tablet, Refills: 0      diclofenac (VOLTAREN) 1 % gel apply to foot and hands as needed for arthritis pain      cholecalciferol (VITAMIN D3) (1000 Units /25 mcg) tablet Take 2,000 Units by mouth daily. furosemide (LASIX) 20 mg tablet Take 20 mg by mouth daily. rosuvastatin (CRESTOR) 20 mg tablet TAKE 1 TABLET NIGHTLY (STOP 40 MG DOSE)  Qty: 90 Tablet, Refills: 3      amLODIPine (NORVASC) 10 mg tablet Take 10 mg by mouth daily. clopidogreL (PLAVIX) 75 mg tab Take 75 mg by mouth daily. sacubitriL-valsartan (Entresto) 49-51 mg tab tablet Take 1 Tab by mouth two (2) times a day. insulin glargine (Lantus Solostar U-100 Insulin) 100 unit/mL (3 mL) inpn Inject 8 units daily. Qty: 15 mL, Refills: 3      hydrALAZINE (APRESOLINE) 25 mg tablet Take 25 mg by mouth three (3) times daily. Refills: 1      sevelamer carbonate (RENVELA) 800 mg tab tab Take  by mouth three (3) times daily. pantoprazole (PROTONIX) 40 mg tablet     Associated Diagnoses: Type 2 diabetes mellitus with chronic kidney disease on chronic dialysis, with long-term current use of insulin (HCC)      acetaminophen (TYLENOL) 500 mg tablet Take  by mouth every six (6) hours as needed for Pain. Associated Diagnoses: Uncontrolled type 2 diabetes mellitus with diabetic nephropathy, with long-term current use of insulin      docusate sodium (COLACE) 100 mg capsule Take 100 mg by mouth as needed for Constipation. Associated Diagnoses: CHF (congestive heart failure) ([address] Ischemic cardiomyopathy; HTN (hypertension)      carvediloL (COREG) 25 mg tablet Take 25 mg by mouth two (2) times daily (with meals). Associated Diagnoses: CHF (congestive heart failure) ([address]  Ischemic cardiomyopathy; HTN (hypertension) ferrous sulfate 325 mg (65 mg iron) tablet Take 325 mg by mouth as needed. Associated Diagnoses: CHF (congestive heart failure) ([facility] Ischemic cardiomyopathy; HTN (hypertension)             * Follow-up Care/Patient Instructions:   Activity: Activity as tolerated  Diet: Low fat, Low cholesterol  Wound Care: None needed    Follow-up Information       Follow up With Specialties Details Why Contact Info    Antonio Maciel MD Family Medicine Follow up in 1 week(s)  [address] [facility] [place]  [phone]      Shira Joiner MD Cardiovascular Disease Physician, Internal Medicine Physician, Interventional Cardiology Physician Follow up in 1 month(s)  [address] [facility] [address] [place] [address] [address]      Radha Camp MD General Surgery Schedule an appointment as soon as possible for a visit As needed, If symptoms worsen [address] [place] [address]  [phone]            Time Spent: > 35 minutes    Signed:  Amy De Jesus MD  3/9/2023  12:20 PM

## 2023-03-09 NOTE — PROGRESS NOTES
Problem: Falls - Risk of  Goal: *Absence of Falls  Description: Document Ryan Dennis Fall Risk and appropriate interventions in the flowsheet.   Outcome: Progressing Towards Goal  Note: Fall Risk Interventions:                                Problem: Patient Education: Go to Patient Education Activity  Goal: Patient/Family Education  Outcome: Progressing Towards Goal     Problem: Pain  Goal: *Control of Pain  Outcome: Progressing Towards Goal

## 2023-03-09 NOTE — PROGRESS NOTES
Problem: Falls - Risk of  Goal: *Absence of Falls  Description: Document [facility]\""l Records Fall Risk and appropriate interventions in the flowsheet.   Outcome: Progressing Towards Goal  Note: Fall Risk Interventions:

## 2023-03-09 NOTE — PROGRESS NOTES
[facility]        Subjective     No acute events  Denies abdominal pain  Does not want \"surgery\"    Objective     Patient Vitals for the past 24 hrs:   Temp Pulse Resp BP SpO2   03/09/23 0804 97.7 °F (36.5 °C) 78 18 (!) 152/68 98 %   03/09/23 0220 98 °F (36.7 °C) 83 18 (!) 140/73 93 %   03/09/23 0000 -- 71 -- -- --   03/08/23 2000 -- 73 -- -- --   03/08/23 1948 97.9 °F (36.6 °C) 70 18 130/60 93 %   03/08/23 1519 98.2 °F (36.8 °C) 84 18 131/60 94 %       Date 03/08/23 0700 - 03/09/23 0659 03/09/23 0700 - 03/10/23 0659   Shift 5162-6446 1985-6222 24 Hour Total 3524-5985 1999-4429 24 Hour Total   INTAKE   Shift Total(mL/kg)         OUTPUT   Urine(mL/kg/hr)           Urine Occurrence(s) 1 x  1 x 1 x  1 x   Stool           Stool Occurrence(s)    1 x  1 x   Dialysis 2540  2540        NET Fluid Removed (mL) 2540  2540      Shift Total(mL/kg) 9236(52.0)  9144(99.5)      NET -2540  -2540      Weight (kg) 66 66 66 66 66 66       PE  GEN - Awake, alert, communicating appropriately. NAD  Pulm - CTAB  CV - RRR  Abd - soft, NT ND      Labs  Recent Results (from the past 24 hour(s))   CBC WITH AUTOMATED DIFF    Collection Time: 03/08/23  5:42 PM   Result Value Ref Range    WBC 5.7 3.6 - 11.0 K/uL    RBC 3.18 (L) 3.80 - 5.20 M/uL    HGB 10.0 (L) 11.5 - 16.0 g/dL    HCT 31.4 (L) 35.0 - 47.0 %    MCV 98.7 80.0 - 99.0 FL    MCH 31.4 26.0 - 34.0 PG    MCHC 31.8 30.0 - 36.5 g/dL    RDW 17.0 (H) 11.5 - 14.5 %    PLATELET 061 (L) 214 - 400 K/uL    MPV 11.6 8.9 - 12.9 FL    NRBC 0.0 0.0  WBC    ABSOLUTE NRBC 0.00 0.00 - 0.01 K/uL    NEUTROPHILS 55 32 - 75 %    LYMPHOCYTES 16 12 - 49 %    MONOCYTES 9 5 - 13 %    EOSINOPHILS 19 (H) 0 - 7 %    BASOPHILS 1 0 - 1 %    IMMATURE GRANULOCYTES 0 0 - 0.5 %    ABS. NEUTROPHILS 3.1 1.8 - 8.0 K/UL    ABS. LYMPHOCYTES 0.9 0.8 - 3.5 K/UL    ABS. MONOCYTES 0.5 0.0 - 1.0 K/UL    ABS. EOSINOPHILS 1.1 (H) 0.0 - 0.4 K/UL    ABS. BASOPHILS 0.1 0.0 - 0.1 K/UL    ABS. IMM. GRANS. 0.0 0.00 - 0.04 K/UL    DF AUTOMATED      RBC COMMENTS Polychromasia  1+        RBC COMMENTS Spherocytes  1+        RBC COMMENTS Microcytosis  2+       RENAL FUNCTION PANEL    Collection Time: 03/09/23  4:31 AM   Result Value Ref Range    Sodium 135 (L) 136 - 145 mmol/L    Potassium Hemolyzed, recollect requested 3.5 - 5.1 mmol/L    Chloride 100 97 - 108 mmol/L    CO2 27 21 - 32 mmol/L    Anion gap 8 5 - 15 mmol/L    Glucose 209 (H) 65 - 100 mg/dL    BUN 28 (H) 6 - 20 mg/dL    Creatinine 6.25 (H) 0.55 - 1.02 mg/dL    BUN/Creatinine ratio 4 (L) 12 - 20      eGFR 7 (L) >60 ml/min/1.73m2    Calcium 8.2 (L) 8.5 - 10.1 mg/dL    Phosphorus 4.1 2.6 - 4.7 mg/dL    Albumin 2.7 (L) 3.5 - 5.0 g/dL   CBC WITH AUTOMATED DIFF    Collection Time: 03/09/23  4:31 AM   Result Value Ref Range    WBC 7.3 3.6 - 11.0 K/uL    RBC 3.49 (L) 3.80 - 5.20 M/uL    HGB 11.1 (L) 11.5 - 16.0 g/dL    HCT 34.2 (L) 35.0 - 47.0 %    MCV 98.0 80.0 - 99.0 FL    MCH 31.8 26.0 - 34.0 PG    MCHC 32.5 30.0 - 36.5 g/dL    RDW 17.2 (H) 11.5 - 14.5 %    PLATELET 698 052 - 083 K/uL    MPV 12.0 8.9 - 12.9 FL    NRBC 0.0 0.0  WBC    ABSOLUTE NRBC 0.00 0.00 - 0.01 K/uL    NEUTROPHILS 63 32 - 75 %    LYMPHOCYTES 10 (L) 12 - 49 %    MONOCYTES 8 5 - 13 %    EOSINOPHILS 18 (H) 0 - 7 %    BASOPHILS 1 0 - 1 %    IMMATURE GRANULOCYTES 0 0 - 0.5 %    ABS. NEUTROPHILS 4.6 1.8 - 8.0 K/UL    ABS. LYMPHOCYTES 0.7 (L) 0.8 - 3.5 K/UL    ABS. MONOCYTES 0.6 0.0 - 1.0 K/UL    ABS. EOSINOPHILS 1.3 (H) 0.0 - 0.4 K/UL    ABS. BASOPHILS 0.1 0.0 - 0.1 K/UL    ABS. IMM. GRANS. 0.0 0.00 - 0.04 K/UL    DF AUTOMATED      RBC COMMENTS Macrocytosis  1+        RBC COMMENTS Anisocytosis  2+             Assessment     Earnest Jeannie is a 70 y. o.yr old female with acute on chronic cholecystitis, CHF, ESRD  Clinically improved, asymptomatic, no leukocytosis  Remains high risk for surgery  She does not want any procedures done at this time    [facility] for regular diet  No plan for surgical intervention  Reasonable to hold off on cholecystostomy as she is clinically improved  Return precautions given for signs of worsening/recurrent cholecystitis  Ok for discharge from surgical point of view when cleared by primary team    35 mins of time was spent with the patient of which > 50% of the time involved face-to-face counseling of the patient regarding the proposed treatment plan.     Rosio Tovar MD

## 2023-03-09 NOTE — PROGRESS NOTES
Problem: Falls - Risk of  Goal: *Absence of Falls  Description: Document Denice Nail Fall Risk and appropriate interventions in the flowsheet.   Outcome: Progressing Towards Goal  Note: Fall Risk Interventions:

## 2023-03-09 NOTE — DISCHARGE SUMMARY
Admit date: 3/6/2023   Admitting Provider: Velasquez Kohler MD    Discharge date: 3/9/2023  Discharging Provider: Sheri Perez NP      * Admission Diagnoses: Acute cholecystitis [K81.0]  Hyperkalemia [E87.5]    * Discharge Diagnoses:    Hospital Problems as of 3/9/2023 Date Reviewed: 4/24/2022            Codes Class Noted - Resolved POA    * (Principal) Acute cholecystitis ICD-10-CM: K81.0  ICD-9-CM: 575.0  3/6/2023 - Present Unknown        Hyperkalemia ICD-10-CM: E87.5  ICD-9-CM: 276.7  7/28/2022 - Present Unknown           * Hospital Course: Tangela Darby is a 70 y.o. female with PMH of ESRD, hypertension, diabetes and other medical problems as below. Poor historian with limited history provided. She presented to the ED with chief complaint of abdominal pain, nausea and vomiting for the past month. Abdominal pain mild-moderate, right-sided abdomen. Associated with nausea and vomiting, denies fever or chills. She also complain of coughing for the past 1 month, associated with some wheezing. No shortness of breath, orthopnea or LE swelling, no history of COPD or smoking. She missed the last hemodialysis session as she was not feeling well, last hemodialysis session was last Friday. Unsure who is her nephrologist.      In the ED, vital signs stable, afebrile. Abdominal ultrasound showed acute calculus cholecystitis. Chest X-ray showed mild pulmonary edema with small left pleural effusion. HIDA scan unable to visualize gallbladder. General surgery following with reccs for surgery, patient refuses surgery stating her pain is better and requesting food. Patient tolerated diet well. Discharge pending general surgeon clearance.      * Procedures:   * No surgery found *      Consults: Cardiology and General Surgery    Significant Diagnostic Studies:   Recent Results (from the past 24 hour(s))   CBC WITH AUTOMATED DIFF    Collection Time: 03/08/23  5:42 PM   Result Value Ref Range    WBC 5.7 3.6 - 11.0 K/uL    RBC 3.18 (L) 3.80 - 5.20 M/uL    HGB 10.0 (L) 11.5 - 16.0 g/dL    HCT 31.4 (L) 35.0 - 47.0 %    MCV 98.7 80.0 - 99.0 FL    MCH 31.4 26.0 - 34.0 PG    MCHC 31.8 30.0 - 36.5 g/dL    RDW 17.0 (H) 11.5 - 14.5 %    PLATELET 528 (L) 831 - 400 K/uL    MPV 11.6 8.9 - 12.9 FL    NRBC 0.0 0.0  WBC    ABSOLUTE NRBC 0.00 0.00 - 0.01 K/uL    NEUTROPHILS 55 32 - 75 %    LYMPHOCYTES 16 12 - 49 %    MONOCYTES 9 5 - 13 %    EOSINOPHILS 19 (H) 0 - 7 %    BASOPHILS 1 0 - 1 %    IMMATURE GRANULOCYTES 0 0 - 0.5 %    ABS. NEUTROPHILS 3.1 1.8 - 8.0 K/UL    ABS. LYMPHOCYTES 0.9 0.8 - 3.5 K/UL    ABS. MONOCYTES 0.5 0.0 - 1.0 K/UL    ABS. EOSINOPHILS 1.1 (H) 0.0 - 0.4 K/UL    ABS. BASOPHILS 0.1 0.0 - 0.1 K/UL    ABS. IMM.  GRANS. 0.0 0.00 - 0.04 K/UL    DF AUTOMATED      RBC COMMENTS Polychromasia  1+        RBC COMMENTS Spherocytes  1+        RBC COMMENTS Microcytosis  2+       RENAL FUNCTION PANEL    Collection Time: 03/09/23  4:31 AM   Result Value Ref Range    Sodium 135 (L) 136 - 145 mmol/L    Potassium Hemolyzed, recollect requested 3.5 - 5.1 mmol/L    Chloride 100 97 - 108 mmol/L    CO2 27 21 - 32 mmol/L    Anion gap 8 5 - 15 mmol/L    Glucose 209 (H) 65 - 100 mg/dL    BUN 28 (H) 6 - 20 mg/dL    Creatinine 6.25 (H) 0.55 - 1.02 mg/dL    BUN/Creatinine ratio 4 (L) 12 - 20      eGFR 7 (L) >60 ml/min/1.73m2    Calcium 8.2 (L) 8.5 - 10.1 mg/dL    Phosphorus 4.1 2.6 - 4.7 mg/dL    Albumin 2.7 (L) 3.5 - 5.0 g/dL   CBC WITH AUTOMATED DIFF    Collection Time: 03/09/23  4:31 AM   Result Value Ref Range    WBC 7.3 3.6 - 11.0 K/uL    RBC 3.49 (L) 3.80 - 5.20 M/uL    HGB 11.1 (L) 11.5 - 16.0 g/dL    HCT 34.2 (L) 35.0 - 47.0 %    MCV 98.0 80.0 - 99.0 FL    MCH 31.8 26.0 - 34.0 PG    MCHC 32.5 30.0 - 36.5 g/dL    RDW 17.2 (H) 11.5 - 14.5 %    PLATELET 925 870 - 527 K/uL    MPV 12.0 8.9 - 12.9 FL    NRBC 0.0 0.0  WBC    ABSOLUTE NRBC 0.00 0.00 - 0.01 K/uL    NEUTROPHILS 63 32 - 75 %    LYMPHOCYTES 10 (L) 12 - 49 %    MONOCYTES 8 5 - 13 % EOSINOPHILS 18 (H) 0 - 7 %    BASOPHILS 1 0 - 1 %    IMMATURE GRANULOCYTES 0 0 - 0.5 %    ABS. NEUTROPHILS 4.6 1.8 - 8.0 K/UL    ABS. LYMPHOCYTES 0.7 (L) 0.8 - 3.5 K/UL    ABS. MONOCYTES 0.6 0.0 - 1.0 K/UL    ABS. EOSINOPHILS 1.3 (H) 0.0 - 0.4 K/UL    ABS. BASOPHILS 0.1 0.0 - 0.1 K/UL    ABS. IMM. GRANS. 0.0 0.00 - 0.04 K/UL    DF AUTOMATED      RBC COMMENTS Macrocytosis  1+        RBC COMMENTS Anisocytosis  2+           CT Results  (Last 48 hours)                    03/06/23 1742   CT ABD PELV WO CONT Final result     Impression:       1. Moderately distended gallbladder with sludge and calcified stone is again   seen. Consider abdominal ultrasound for further evaluation. 2. Bibasilar atelectasis with mild right and mild to moderate left pleural   effusions. 3. Please refer to above findings for complete details. Narrative:   INDICATION: Abdominal pain       COMPARISON: 7/28/2022       TECHNIQUE:    Thin axial images were obtained through the abdomen and pelvis without   intravenous iodinated contrast administration. Coronal and sagittal   reconstructions were generated. Oral contrast was not administered. CT dose   reduction was achieved through use of a standardized protocol tailored for this   examination and automatic exposure control for dose modulation. FINDINGS:       Lack of IV contrast limits evaluation of the solid abdominal organs. LIVER: No mass or biliary dilatation. GALLBLADDER: Moderately distended with moderate sludge and moderate size   gallstone. SPLEEN: Unremarkable   PANCREAS: No mass or ductal dilatation. ADRENALS: Unremarkable. KIDNEYS/URETERS: Atrophic bilateral kidneys with cortical scarring. Small   nonspecific low-density right renal lesion. No hydronephrosis    PERITONEUM: No abdominal lymphadenopathy or ascites. COLON: No dilatation or wall thickening. APPENDIX: Not visualized   SMALL BOWEL: No dilatation or wall thickening. STOMACH: Unremarkable. PELVIS: Small amount of pelvic free fluid. No pelvic lymphadenopathy   BONES: Moderate L5-S1 degenerative disc disease    VISUALIZED THORAX: Bibasilar atelectasis with mild right and mild to moderate   left pleural effusions   ADDITIONAL COMMENTS: Diffuse subcutaneous edema                       NM HEPATOBILIARY DUCT SCAN   Final Result   The gallbladder is not visualized despite imaging up to 120 minutes. This is concerning for cystic duct obstruction although this could also   represent gallbladder dysfunction. XR CHEST PORT   Final Result       Mild pulmonary edema and small left pleural effusion, similar to 8/27/2022. US RUQ   Final Result       Acute calculus cholecystitis with dilated CBD. Small amount of ascites   Medical renal disease           CT ABD PELV WO CONT   Final Result       1. Moderately distended gallbladder with sludge and calcified stone is again   seen. Consider abdominal ultrasound for further evaluation. 2. Bibasilar atelectasis with mild right and mild to moderate left pleural   effusions. 3. Please refer to above findings for complete details. Discharge Exam:  Vitals and nursing note reviewed. Constitutional:       Appearance: Normal appearance. Cardiovascular:      Rate and Rhythm: Normal rate and regular rhythm. Pulmonary:      Effort: Pulmonary effort is normal.      Breath sounds: Normal breath sounds. Abdominal:      General: Bowel sounds are normal. There is distension. Tenderness: There is no abdominal tenderness. Skin:     General: Skin is warm and dry. Neurological:      Mental Status: She is alert. Mental status is at baseline. * Discharge Condition: improved  * Disposition: Home    Discharge Medications:  Current Discharge Medication List        START taking these medications    Details   amoxicillin-clavulanate (Augmentin) 875-125 mg per tablet Take 1 Tablet by mouth every twelve (12) hours for 5 days.   Qty: 10 Tablet, Refills: 0  Start date: 3/9/2023, End date: 3/14/2023           CONTINUE these medications which have NOT CHANGED    Details   isosorbide dinitrate (ISORDIL) 10 mg tablet Take 1 Tablet by mouth three (3) times daily. Qty: 90 Tablet, Refills: 0      diclofenac (VOLTAREN) 1 % gel apply to foot and hands as needed for arthritis pain      cholecalciferol (VITAMIN D3) (1000 Units /25 mcg) tablet Take 2,000 Units by mouth daily. furosemide (LASIX) 20 mg tablet Take 20 mg by mouth daily. rosuvastatin (CRESTOR) 20 mg tablet TAKE 1 TABLET NIGHTLY (STOP 40 MG DOSE)  Qty: 90 Tablet, Refills: 3      amLODIPine (NORVASC) 10 mg tablet Take 10 mg by mouth daily. clopidogreL (PLAVIX) 75 mg tab Take 75 mg by mouth daily. sacubitriL-valsartan (Entresto) 49-51 mg tab tablet Take 1 Tab by mouth two (2) times a day. insulin glargine (Lantus Solostar U-100 Insulin) 100 unit/mL (3 mL) inpn Inject 8 units daily. Qty: 15 mL, Refills: 3      hydrALAZINE (APRESOLINE) 25 mg tablet Take 25 mg by mouth three (3) times daily. Refills: 1      sevelamer carbonate (RENVELA) 800 mg tab tab Take  by mouth three (3) times daily. pantoprazole (PROTONIX) 40 mg tablet     Associated Diagnoses: Type 2 diabetes mellitus with chronic kidney disease on chronic dialysis, with long-term current use of insulin (HCC)      acetaminophen (TYLENOL) 500 mg tablet Take  by mouth every six (6) hours as needed for Pain. Associated Diagnoses: Uncontrolled type 2 diabetes mellitus with diabetic nephropathy, with long-term current use of insulin      docusate sodium (COLACE) 100 mg capsule Take 100 mg by mouth as needed for Constipation. Associated Diagnoses: CHF (congestive heart failure) ([address] Ischemic cardiomyopathy; HTN (hypertension)      carvediloL (COREG) 25 mg tablet Take 25 mg by mouth two (2) times daily (with meals). Associated Diagnoses: CHF (congestive heart failure) ([address]  Ischemic cardiomyopathy; HTN (hypertension) ferrous sulfate 325 mg (65 mg iron) tablet Take 325 mg by mouth as needed. Associated Diagnoses: CHF (congestive heart failure) ([facility] [address] Ischemic cardiomyopathy; HTN (hypertension)             * Follow-up Care/Patient Instructions:   Activity: Activity as tolerated  Diet: Low fat, Low cholesterol  Wound Care: None needed    Follow-up Information       Follow up With Specialties Details Why Contact Info    Sonia Aviles MD Family Medicine Follow up in 1 week(s)  [address] [facility] [phone]      Marleni Alfonso MD Cardiovascular Disease Physician, Internal Medicine Physician, Interventional Cardiology Physician Follow up in 1 month(s)  [address]  [phone]      Jacquie Acosta MD General Surgery Schedule an appointment as soon as possible for a visit As needed, If symptoms worsen [address] [place] [address]  [phone]            Time Spent: > 35 minutes    Signed:  Babar Alejandro NP  3/9/2023  12:20 PM

## 2023-03-09 NOTE — PROGRESS NOTES
[facility]        Subjective     No acute events  Denies abdominal pain  Does not want \"surgery\"    Objective     Patient Vitals for the past 24 hrs:   Temp Pulse Resp BP SpO2   03/09/23 0804 97.7 °F (36.5 °C) 78 18 (!) 152/68 98 %   03/09/23 0220 98 °F (36.7 °C) 83 18 (!) 140/73 93 %   03/09/23 0000 -- 71 -- -- --   03/08/23 2000 -- 73 -- -- --   03/08/23 1948 97.9 °F (36.6 °C) 70 18 130/60 93 %   03/08/23 1519 98.2 °F (36.8 °C) 84 18 131/60 94 %       Date 03/08/23 0700 - 03/09/23 0659 03/09/23 0700 - 03/10/23 0659   Shift 8595-0633 9085-3796 24 Hour Total 4375-2796 8526-1545 24 Hour Total   INTAKE   Shift Total(mL/kg)         OUTPUT   Urine(mL/kg/hr)           Urine Occurrence(s) 1 x  1 x 1 x  1 x   Stool           Stool Occurrence(s)    1 x  1 x   Dialysis 2540  2540        NET Fluid Removed (mL) 2540  2540      Shift Total(mL/kg) 9404(30.0)  9471(70.6)      NET -2540  -2540      Weight (kg) 66 66 66 66 66 66       PE  GEN - Awake, alert, communicating appropriately. NAD  Pulm - CTAB  CV - RRR  Abd - soft, NT ND      Labs  Recent Results (from the past 24 hour(s))   CBC WITH AUTOMATED DIFF    Collection Time: 03/08/23  5:42 PM   Result Value Ref Range    WBC 5.7 3.6 - 11.0 K/uL    RBC 3.18 (L) 3.80 - 5.20 M/uL    HGB 10.0 (L) 11.5 - 16.0 g/dL    HCT 31.4 (L) 35.0 - 47.0 %    MCV 98.7 80.0 - 99.0 FL    MCH 31.4 26.0 - 34.0 PG    MCHC 31.8 30.0 - 36.5 g/dL    RDW 17.0 (H) 11.5 - 14.5 %    PLATELET 015 (L) 347 - 400 K/uL    MPV 11.6 8.9 - 12.9 FL    NRBC 0.0 0.0  WBC    ABSOLUTE NRBC 0.00 0.00 - 0.01 K/uL    NEUTROPHILS 55 32 - 75 %    LYMPHOCYTES 16 12 - 49 %    MONOCYTES 9 5 - 13 %    EOSINOPHILS 19 (H) 0 - 7 %    BASOPHILS 1 0 - 1 %    IMMATURE GRANULOCYTES 0 0 - 0.5 %    ABS. NEUTROPHILS 3.1 1.8 - 8.0 K/UL    ABS. LYMPHOCYTES 0.9 0.8 - 3.5 K/UL    ABS. MONOCYTES 0.5 0.0 - 1.0 K/UL    ABS. EOSINOPHILS 1.1 (H) 0.0 - 0.4 K/UL    ABS. BASOPHILS 0.1 0.0 - 0.1 K/UL    ABS. IMM. GRANS. 0.0 0.00 - 0.04 K/UL    DF AUTOMATED      RBC COMMENTS Polychromasia  1+        RBC COMMENTS Spherocytes  1+        RBC COMMENTS Microcytosis  2+       RENAL FUNCTION PANEL    Collection Time: 03/09/23  4:31 AM   Result Value Ref Range    Sodium 135 (L) 136 - 145 mmol/L    Potassium Hemolyzed, recollect requested 3.5 - 5.1 mmol/L    Chloride 100 97 - 108 mmol/L    CO2 27 21 - 32 mmol/L    Anion gap 8 5 - 15 mmol/L    Glucose 209 (H) 65 - 100 mg/dL    BUN 28 (H) 6 - 20 mg/dL    Creatinine 6.25 (H) 0.55 - 1.02 mg/dL    BUN/Creatinine ratio 4 (L) 12 - 20      eGFR 7 (L) >60 ml/min/1.73m2    Calcium 8.2 (L) 8.5 - 10.1 mg/dL    Phosphorus 4.1 2.6 - 4.7 mg/dL    Albumin 2.7 (L) 3.5 - 5.0 g/dL   CBC WITH AUTOMATED DIFF    Collection Time: 03/09/23  4:31 AM   Result Value Ref Range    WBC 7.3 3.6 - 11.0 K/uL    RBC 3.49 (L) 3.80 - 5.20 M/uL    HGB 11.1 (L) 11.5 - 16.0 g/dL    HCT 34.2 (L) 35.0 - 47.0 %    MCV 98.0 80.0 - 99.0 FL    MCH 31.8 26.0 - 34.0 PG    MCHC 32.5 30.0 - 36.5 g/dL    RDW 17.2 (H) 11.5 - 14.5 %    PLATELET 501 278 - 260 K/uL    MPV 12.0 8.9 - 12.9 FL    NRBC 0.0 0.0  WBC    ABSOLUTE NRBC 0.00 0.00 - 0.01 K/uL    NEUTROPHILS 63 32 - 75 %    LYMPHOCYTES 10 (L) 12 - 49 %    MONOCYTES 8 5 - 13 %    EOSINOPHILS 18 (H) 0 - 7 %    BASOPHILS 1 0 - 1 %    IMMATURE GRANULOCYTES 0 0 - 0.5 %    ABS. NEUTROPHILS 4.6 1.8 - 8.0 K/UL    ABS. LYMPHOCYTES 0.7 (L) 0.8 - 3.5 K/UL    ABS. MONOCYTES 0.6 0.0 - 1.0 K/UL    ABS. EOSINOPHILS 1.3 (H) 0.0 - 0.4 K/UL    ABS. BASOPHILS 0.1 0.0 - 0.1 K/UL    ABS. IMM. GRANS. 0.0 0.00 - 0.04 K/UL    DF AUTOMATED      RBC COMMENTS Macrocytosis  1+        RBC COMMENTS Anisocytosis  2+             Assessment     Bart Mancuso is a 70 y. o.yr old female with acute on chronic cholecystitis, CHF, ESRD  Clinically improved, asymptomatic, no leukocytosis  Remains high risk for surgery  She does not want any procedures done at this time    [facility] for regular diet  No plan for surgical intervention  Reasonable to hold off on cholecystostomy as she is clinically improved  Return precautions given for signs of worsening/recurrent cholecystitis  Ok for discharge from surgical point of view when cleared by primary team    35 mins of time was spent with the patient of which > 50% of the time involved face-to-face counseling of the patient regarding the proposed treatment plan.     Delia France MD

## 2023-03-09 NOTE — PROGRESS NOTES
Patient discharging home today, self care. Daughter on her way to  patient. No concerns from patient. Discharge plan of care/case management plan validated with provider discharge order. Medicare pt has received, reviewed, and signed 2nd IM letter informing them of their right to appeal the discharge. Signed copied has been placed on pt bedside chart.

## 2023-06-06 DIAGNOSIS — E11.22 TYPE 2 DIABETES MELLITUS WITH CHRONIC KIDNEY DISEASE, WITH LONG-TERM CURRENT USE OF INSULIN, UNSPECIFIED CKD STAGE (HCC): Primary | ICD-10-CM

## 2023-06-06 DIAGNOSIS — Z79.4 LONG TERM (CURRENT) USE OF INSULIN (HCC): ICD-10-CM

## 2023-06-06 DIAGNOSIS — Z79.4 TYPE 2 DIABETES MELLITUS WITH CHRONIC KIDNEY DISEASE, WITH LONG-TERM CURRENT USE OF INSULIN, UNSPECIFIED CKD STAGE (HCC): Primary | ICD-10-CM

## 2023-06-08 ENCOUNTER — TELEPHONE (OUTPATIENT)
Age: 68
End: 2023-06-08

## 2023-06-08 RX ORDER — INSULIN GLARGINE 100 [IU]/ML
INJECTION, SOLUTION SUBCUTANEOUS
Qty: 15 ML | Refills: 3 | Status: SHIPPED | OUTPATIENT
Start: 2023-06-08

## 2023-06-08 NOTE — TELEPHONE ENCOUNTER
Pt's daughter left a VM stating that she needed to know what pharmacy her mother was getting her insulin from so she could contact them for a refill. Daughter was informed that [facility] was her mother's [facility] and that an rx will be submitted. Attempted to reorder lantus, lantus is pending in a separate refill encounter.

## 2023-08-03 ENCOUNTER — TELEPHONE (OUTPATIENT)
Age: 68
End: 2023-08-03

## 2023-08-03 NOTE — TELEPHONE ENCOUNTER
Spoke with daughter and patient of out of network insurance and gave information to call and check what her out of pocket would be since she has a ppo plan as to whether she is willing to pay gave her information to also advocate with insurance for herself to have exception done for continued care as there is a shortage for endocrinologist and those available are booked into next year.

## 2023-10-25 ENCOUNTER — HOSPITAL ENCOUNTER (EMERGENCY)
Facility: HOSPITAL | Age: 68
Discharge: HOME | End: 2023-10-25
Attending: EMERGENCY MEDICINE
Payer: MEDICARE

## 2023-10-25 VITALS
SYSTOLIC BLOOD PRESSURE: 164 MMHG | TEMPERATURE: 98.2 F | WEIGHT: 150 LBS | BODY MASS INDEX: 24.11 KG/M2 | HEART RATE: 91 BPM | OXYGEN SATURATION: 98 % | HEIGHT: 66 IN | DIASTOLIC BLOOD PRESSURE: 68 MMHG | RESPIRATION RATE: 16 BRPM

## 2023-10-25 DIAGNOSIS — T82.9XXA COMPLICATION OF ARTERIOVENOUS DIALYSIS FISTULA, INITIAL ENCOUNTER: Primary | ICD-10-CM

## 2023-10-25 PROCEDURE — 99283 EMERGENCY DEPT VISIT LOW MDM: CPT

## 2023-10-25 ASSESSMENT — LIFESTYLE VARIABLES
HOW MANY STANDARD DRINKS CONTAINING ALCOHOL DO YOU HAVE ON A TYPICAL DAY: PATIENT DOES NOT DRINK
HOW OFTEN DO YOU HAVE A DRINK CONTAINING ALCOHOL: NEVER

## 2023-10-25 ASSESSMENT — PAIN SCALES - GENERAL
PAINLEVEL_OUTOF10: 5
PAINLEVEL_OUTOF10: 0

## 2023-10-25 ASSESSMENT — PAIN DESCRIPTION - LOCATION: LOCATION: HEAD

## 2023-10-26 NOTE — ED NOTES
Clamp removed by Too Winn. Preparing to remove dialysis line that patient arrives with.       Gomez Romero RN  10/25/23 9837

## 2023-10-26 NOTE — ED NOTES
Dialysis line removed from fistula by Jovnay Mei. This RN holding pressure on fistula site & fistula clamp then placed back over fistula.       Prudence , MARYLOU  10/25/23 2124

## 2023-10-26 NOTE — ED TRIAGE NOTES
Patient arrives via EMS from dialysis center for a bleeding fistula. Patient received full dialysis session. Fistula clamped off by dialysis staff prior to ED arrival. Patient alert and oriented.

## 2023-10-26 NOTE — ED NOTES
Clamp removed. Bleeding stopped. Hemostatic dressing and coban placed over fistula for precaution prior to d/c. Daughter notified patient ready for discharge & daughter states she is en route.      Angeles Lockhart RN  10/25/23 3772

## 2024-01-26 ENCOUNTER — HOSPITAL ENCOUNTER (EMERGENCY)
Facility: HOSPITAL | Age: 69
Discharge: HOME | End: 2024-01-26
Attending: EMERGENCY MEDICINE
Payer: MEDICARE

## 2024-01-26 ENCOUNTER — APPOINTMENT (OUTPATIENT)
Facility: HOSPITAL | Age: 69
End: 2024-01-26
Payer: MEDICARE

## 2024-01-26 VITALS
HEART RATE: 80 BPM | DIASTOLIC BLOOD PRESSURE: 66 MMHG | SYSTOLIC BLOOD PRESSURE: 128 MMHG | OXYGEN SATURATION: 98 % | RESPIRATION RATE: 16 BRPM | TEMPERATURE: 97.5 F

## 2024-01-26 DIAGNOSIS — R40.4 TRANSIENT ALTERATION OF AWARENESS: Primary | ICD-10-CM

## 2024-01-26 LAB
ALBUMIN SERPL-MCNC: 3.2 G/DL (ref 3.5–5)
ALBUMIN/GLOB SERPL: 1 (ref 1.1–2.2)
ALP SERPL-CCNC: 119 U/L (ref 45–117)
ALT SERPL-CCNC: 20 U/L (ref 12–78)
ANION GAP SERPL CALC-SCNC: 4 MMOL/L (ref 5–15)
APPEARANCE UR: CLEAR
AST SERPL W P-5'-P-CCNC: 11 U/L (ref 15–37)
BACTERIA URNS QL MICRO: NEGATIVE /HPF
BASOPHILS # BLD: 0.1 K/UL (ref 0–0.1)
BASOPHILS NFR BLD: 1 % (ref 0–1)
BILIRUB SERPL-MCNC: 0.7 MG/DL (ref 0.2–1)
BILIRUB UR QL: NEGATIVE
BNP SERPL-MCNC: ABNORMAL PG/ML
BUN SERPL-MCNC: 34 MG/DL (ref 6–20)
BUN/CREAT SERPL: 5 (ref 12–20)
CA-I BLD-MCNC: 8.1 MG/DL (ref 8.5–10.1)
CHLORIDE SERPL-SCNC: 102 MMOL/L (ref 97–108)
CO2 SERPL-SCNC: 33 MMOL/L (ref 21–32)
COLOR UR: ABNORMAL
CREAT SERPL-MCNC: 6.44 MG/DL (ref 0.55–1.02)
DIFFERENTIAL METHOD BLD: ABNORMAL
EOSINOPHIL # BLD: 0.6 K/UL (ref 0–0.4)
EOSINOPHIL NFR BLD: 11 % (ref 0–7)
EPITH CASTS URNS QL MICRO: ABNORMAL /LPF
ERYTHROCYTE [DISTWIDTH] IN BLOOD BY AUTOMATED COUNT: 17.7 % (ref 11.5–14.5)
GLOBULIN SER CALC-MCNC: 3.3 G/DL (ref 2–4)
GLUCOSE BLD STRIP.AUTO-MCNC: 160 MG/DL (ref 65–100)
GLUCOSE SERPL-MCNC: 164 MG/DL (ref 65–100)
GLUCOSE UR STRIP.AUTO-MCNC: 50 MG/DL
HCT VFR BLD AUTO: 31.6 % (ref 35–47)
HGB BLD-MCNC: 9.9 G/DL (ref 11.5–16)
HGB UR QL STRIP: NEGATIVE
IMM GRANULOCYTES # BLD AUTO: 0.1 K/UL (ref 0–0.04)
IMM GRANULOCYTES NFR BLD AUTO: 2 % (ref 0–0.5)
KETONES UR QL STRIP.AUTO: NEGATIVE MG/DL
LEUKOCYTE ESTERASE UR QL STRIP.AUTO: NEGATIVE
LYMPHOCYTES # BLD: 0.6 K/UL (ref 0.8–3.5)
LYMPHOCYTES NFR BLD: 10 % (ref 12–49)
MAGNESIUM SERPL-MCNC: 2.4 MG/DL (ref 1.6–2.4)
MCH RBC QN AUTO: 30.6 PG (ref 26–34)
MCHC RBC AUTO-ENTMCNC: 31.3 G/DL (ref 30–36.5)
MCV RBC AUTO: 97.5 FL (ref 80–99)
MONOCYTES # BLD: 0.4 K/UL (ref 0–1)
MONOCYTES NFR BLD: 8 % (ref 5–13)
NEUTS SEG # BLD: 3.7 K/UL (ref 1.8–8)
NEUTS SEG NFR BLD: 68 % (ref 32–75)
NITRITE UR QL STRIP.AUTO: NEGATIVE
NRBC # BLD: 0 K/UL (ref 0–0.01)
NRBC BLD-RTO: 0 PER 100 WBC
PERFORMED BY:: ABNORMAL
PH UR STRIP: >8.5 [PH] (ref 5–8)
PLATELET # BLD AUTO: 114 K/UL (ref 150–400)
PMV BLD AUTO: 11.6 FL (ref 8.9–12.9)
POTASSIUM SERPL-SCNC: 4.6 MMOL/L (ref 3.5–5.1)
PROT SERPL-MCNC: 6.5 G/DL (ref 6.4–8.2)
PROT UR STRIP-MCNC: >300 MG/DL
RBC # BLD AUTO: 3.24 M/UL (ref 3.8–5.2)
RBC #/AREA URNS HPF: ABNORMAL /HPF (ref 0–5)
SODIUM SERPL-SCNC: 139 MMOL/L (ref 136–145)
SP GR UR REFRACTOMETRY: 1.01 (ref 1–1.03)
TROPONIN I SERPL HS-MCNC: 43 NG/L (ref 0–51)
UROBILINOGEN UR QL STRIP.AUTO: 0.1 EU/DL (ref 0.1–1)
WBC # BLD AUTO: 5.4 K/UL (ref 3.6–11)
WBC URNS QL MICRO: ABNORMAL /HPF (ref 0–4)

## 2024-01-26 PROCEDURE — 84484 ASSAY OF TROPONIN QUANT: CPT

## 2024-01-26 PROCEDURE — 93005 ELECTROCARDIOGRAM TRACING: CPT | Performed by: EMERGENCY MEDICINE

## 2024-01-26 PROCEDURE — 82962 GLUCOSE BLOOD TEST: CPT

## 2024-01-26 PROCEDURE — 85025 COMPLETE CBC W/AUTO DIFF WBC: CPT

## 2024-01-26 PROCEDURE — 83880 ASSAY OF NATRIURETIC PEPTIDE: CPT

## 2024-01-26 PROCEDURE — 71045 X-RAY EXAM CHEST 1 VIEW: CPT

## 2024-01-26 PROCEDURE — 80053 COMPREHEN METABOLIC PANEL: CPT

## 2024-01-26 PROCEDURE — 83735 ASSAY OF MAGNESIUM: CPT

## 2024-01-26 PROCEDURE — 99284 EMERGENCY DEPT VISIT MOD MDM: CPT

## 2024-01-26 PROCEDURE — 81001 URINALYSIS AUTO W/SCOPE: CPT

## 2024-01-26 NOTE — ED PROVIDER NOTES
[facility] EMERGENCY DEPT  EMERGENCY DEPARTMENT HISTORY AND PHYSICAL EXAM      Date: 1/26/2024  Patient Name: Sherly Chakraborty  MRN: [id number]  Birthdate 1955  Date of evaluation: 1/26/2024  Provider: Alexsandra Mcwilliams MD   Note Started: 5:00 PM EST 1/26/24    HISTORY OF PRESENT ILLNESS     Chief Complaint   Patient presents with    Altered Mental Status       History Provided By: Patient    HPI: Sherly Chakraborty is a 68 y.o. female 68-year-old who is blind complaint of acute confusion noted by family they were concerned that she may have a UTI as she has been having some symptoms at home. Esrd on HD    PAST MEDICAL HISTORY   Past Medical History:  Past Medical History:   Diagnosis Date    Blind     CKD (chronic kidney disease)     HTN (hypertension)     Retinopathy     Type II or unspecified type diabetes mellitus with renal manifestations, uncontrolled(250.42)        Past Surgical History:  Past Surgical History:   Procedure Laterality Date    CORONARY ARTERY BYPASS GRAFT      GYN      hysterectomy        Family History:  Family History   Problem Relation Age of Onset    Diabetes Maternal Grandmother     Diabetes Maternal Aunt        Social History:  Social History     Tobacco Use    Smoking status: Never    Smokeless tobacco: Never   Substance Use Topics    Alcohol use: No    Drug use: Never       Allergies:  No Known Allergies    PCP: Derick Paula MD    Current Meds:   No current facility-administered medications for this encounter.     Current Outpatient Medications   Medication Sig Dispense Refill    insulin glargine (LANTUS SOLOSTAR) 100 UNIT/ML injection pen Inject 8 units daily. 15 mL 3    acetaminophen (TYLENOL) 500 MG tablet Take by mouth every 6 hours as needed      amLODIPine (NORVASC) 10 MG tablet Take 10 mg by mouth daily      carvedilol (COREG) 25 MG tablet Take 25 mg by mouth 2 times daily (with meals)      vitamin D (CHOLECALCIFEROL) 25 MCG (1000 UT) TABS tablet Take 2,000 Units by mouth daily    sacubitril-valsartan     ferrous sulfate 325 (65 Fe) MG tablet  Commonly known as: IRON 325     furosemide 20 MG tablet  Commonly known as: LASIX     hydrALAZINE 25 MG tablet  Commonly known as: APRESOLINE     isosorbide dinitrate 10 MG tablet  Commonly known as: ISORDIL     Lantus SoloStar 100 UNIT/ML injection pen  Generic drug: insulin glargine  Inject 8 units daily.     pantoprazole 40 MG tablet  Commonly known as: PROTONIX     rosuvastatin 20 MG tablet  Commonly known as: CRESTOR     sevelamer 800 MG tablet  Commonly known as: RENVELA     vitamin D 25 MCG (1000 UT) Tabs tablet  Commonly known as: CHOLECALCIFEROL                DISCONTINUED MEDICATIONS:  Discharge Medication List as of 1/26/2024  7:13 PM          I am the Primary Clinician of Record. Alexsandra Mcwilliams MD (electronically signed)    (Please note that parts of this dictation were completed with voice recognition software. Quite often unanticipated grammatical, syntax, homophones, and other interpretive errors are inadvertently transcribed by the computer software. Please disregards these errors. Please excuse any errors that have escaped final proofreading.)        Alexsandra Mcwilliams MD  01/27/24 1398

## 2024-01-29 LAB
EKG ATRIAL RATE: 84 BPM
EKG DIAGNOSIS: NORMAL
EKG P AXIS: 44 DEGREES
EKG P-R INTERVAL: 164 MS
EKG Q-T INTERVAL: 410 MS
EKG QRS DURATION: 84 MS
EKG QTC CALCULATION (BAZETT): 484 MS
EKG R AXIS: -53 DEGREES
EKG T AXIS: 108 DEGREES
EKG VENTRICULAR RATE: 84 BPM

## 2024-03-14 ENCOUNTER — APPOINTMENT (OUTPATIENT)
Facility: HOSPITAL | Age: 69
DRG: 535 | End: 2024-03-14
Payer: MEDICARE

## 2024-03-14 ENCOUNTER — HOSPITAL ENCOUNTER (INPATIENT)
Facility: HOSPITAL | Age: 69
LOS: 5 days | Discharge: NURSING, REHAB OR LONG-TERM CARE | DRG: 535 | End: 2024-03-19
Attending: EMERGENCY MEDICINE | Admitting: HOSPITALIST
Payer: MEDICARE

## 2024-03-14 DIAGNOSIS — R26.2 INABILITY TO WALK: Primary | ICD-10-CM

## 2024-03-14 DIAGNOSIS — S32.591A CLOSED FRACTURE OF RIGHT INFERIOR PUBIC RAMUS, INITIAL ENCOUNTER (HCC): ICD-10-CM

## 2024-03-14 PROBLEM — M84.350A: Status: ACTIVE | Noted: 2024-03-14

## 2024-03-14 LAB
ALBUMIN SERPL-MCNC: 3.2 G/DL (ref 3.5–5)
ALBUMIN/GLOB SERPL: 0.9 (ref 1.1–2.2)
ALP SERPL-CCNC: 135 U/L (ref 45–117)
ALT SERPL-CCNC: 24 U/L (ref 12–78)
ANION GAP SERPL CALC-SCNC: 9 MMOL/L (ref 5–15)
APPEARANCE UR: ABNORMAL
AST SERPL-CCNC: 18 U/L (ref 15–37)
BACTERIA URNS QL MICRO: ABNORMAL /HPF
BASOPHILS # BLD: 0.1 K/UL (ref 0–0.1)
BASOPHILS NFR BLD: 1 % (ref 0–1)
BILIRUB SERPL-MCNC: 0.8 MG/DL (ref 0.2–1)
BILIRUB UR QL: NEGATIVE
BUN SERPL-MCNC: 39 MG/DL (ref 6–20)
BUN/CREAT SERPL: 6 (ref 12–20)
CALCIUM SERPL-MCNC: 8.5 MG/DL (ref 8.5–10.1)
CHLORIDE SERPL-SCNC: 98 MMOL/L (ref 97–108)
CO2 SERPL-SCNC: 27 MMOL/L (ref 21–32)
COLOR UR: ABNORMAL
COMMENT:: NORMAL
CREAT SERPL-MCNC: 6.84 MG/DL (ref 0.55–1.02)
DIFFERENTIAL METHOD BLD: ABNORMAL
EOSINOPHIL # BLD: 0.4 K/UL (ref 0–0.4)
EOSINOPHIL NFR BLD: 6 % (ref 0–7)
EPITH CASTS URNS QL MICRO: ABNORMAL /LPF
ERYTHROCYTE [DISTWIDTH] IN BLOOD BY AUTOMATED COUNT: 16.9 % (ref 11.5–14.5)
GLOBULIN SER CALC-MCNC: 3.4 G/DL (ref 2–4)
GLUCOSE BLD STRIP.AUTO-MCNC: 267 MG/DL (ref 65–117)
GLUCOSE SERPL-MCNC: 332 MG/DL (ref 65–100)
GLUCOSE UR STRIP.AUTO-MCNC: >1000 MG/DL
HCT VFR BLD AUTO: 35 % (ref 35–47)
HGB BLD-MCNC: 10.9 G/DL (ref 11.5–16)
HGB UR QL STRIP: ABNORMAL
IMM GRANULOCYTES # BLD AUTO: 0 K/UL (ref 0–0.04)
IMM GRANULOCYTES NFR BLD AUTO: 0 % (ref 0–0.5)
KETONES UR QL STRIP.AUTO: NEGATIVE MG/DL
LEUKOCYTE ESTERASE UR QL STRIP.AUTO: ABNORMAL
LYMPHOCYTES # BLD: 0.6 K/UL (ref 0.8–3.5)
LYMPHOCYTES NFR BLD: 9 % (ref 12–49)
MCH RBC QN AUTO: 32.3 PG (ref 26–34)
MCHC RBC AUTO-ENTMCNC: 31.1 G/DL (ref 30–36.5)
MCV RBC AUTO: 103.9 FL (ref 80–99)
MONOCYTES # BLD: 0.6 K/UL (ref 0–1)
MONOCYTES NFR BLD: 9 % (ref 5–13)
NEUTS SEG # BLD: 4.7 K/UL (ref 1.8–8)
NEUTS SEG NFR BLD: 75 % (ref 32–75)
NITRITE UR QL STRIP.AUTO: NEGATIVE
NRBC # BLD: 0 K/UL (ref 0–0.01)
NRBC BLD-RTO: 0 PER 100 WBC
PH UR STRIP: 7 (ref 5–8)
PLATELET # BLD AUTO: 105 K/UL (ref 150–400)
PMV BLD AUTO: 12.2 FL (ref 8.9–12.9)
POTASSIUM SERPL-SCNC: 4.1 MMOL/L (ref 3.5–5.1)
PROT SERPL-MCNC: 6.6 G/DL (ref 6.4–8.2)
PROT UR STRIP-MCNC: 300 MG/DL
RBC # BLD AUTO: 3.37 M/UL (ref 3.8–5.2)
RBC #/AREA URNS HPF: ABNORMAL /HPF (ref 0–5)
RBC MORPH BLD: ABNORMAL
SERVICE CMNT-IMP: ABNORMAL
SODIUM SERPL-SCNC: 134 MMOL/L (ref 136–145)
SP GR UR REFRACTOMETRY: 1.02 (ref 1–1.03)
SPECIMEN HOLD: NORMAL
UROBILINOGEN UR QL STRIP.AUTO: 0.2 EU/DL (ref 0.2–1)
WBC # BLD AUTO: 6.4 K/UL (ref 3.6–11)
WBC URNS QL MICRO: ABNORMAL /HPF (ref 0–4)

## 2024-03-14 PROCEDURE — 36415 COLL VENOUS BLD VENIPUNCTURE: CPT

## 2024-03-14 PROCEDURE — 99285 EMERGENCY DEPT VISIT HI MDM: CPT

## 2024-03-14 PROCEDURE — 73502 X-RAY EXAM HIP UNI 2-3 VIEWS: CPT

## 2024-03-14 PROCEDURE — 80053 COMPREHEN METABOLIC PANEL: CPT

## 2024-03-14 PROCEDURE — 71045 X-RAY EXAM CHEST 1 VIEW: CPT

## 2024-03-14 PROCEDURE — 2580000003 HC RX 258: Performed by: HOSPITALIST

## 2024-03-14 PROCEDURE — 72192 CT PELVIS W/O DYE: CPT

## 2024-03-14 PROCEDURE — 6370000000 HC RX 637 (ALT 250 FOR IP): Performed by: EMERGENCY MEDICINE

## 2024-03-14 PROCEDURE — 85025 COMPLETE CBC W/AUTO DIFF WBC: CPT

## 2024-03-14 PROCEDURE — 81001 URINALYSIS AUTO W/SCOPE: CPT

## 2024-03-14 PROCEDURE — 1100000000 HC RM PRIVATE

## 2024-03-14 PROCEDURE — 6370000000 HC RX 637 (ALT 250 FOR IP): Performed by: HOSPITALIST

## 2024-03-14 PROCEDURE — 82962 GLUCOSE BLOOD TEST: CPT

## 2024-03-14 RX ORDER — POTASSIUM CHLORIDE 7.45 MG/ML
10 INJECTION INTRAVENOUS PRN
Status: DISCONTINUED | OUTPATIENT
Start: 2024-03-14 | End: 2024-03-19 | Stop reason: HOSPADM

## 2024-03-14 RX ORDER — TRAMADOL HYDROCHLORIDE 50 MG/1
100 TABLET ORAL EVERY 6 HOURS PRN
Status: DISCONTINUED | OUTPATIENT
Start: 2024-03-14 | End: 2024-03-18

## 2024-03-14 RX ORDER — SEVELAMER CARBONATE 800 MG/1
800 TABLET, FILM COATED ORAL
Status: DISCONTINUED | OUTPATIENT
Start: 2024-03-14 | End: 2024-03-19 | Stop reason: HOSPADM

## 2024-03-14 RX ORDER — CARVEDILOL 12.5 MG/1
25 TABLET ORAL 2 TIMES DAILY WITH MEALS
Status: DISCONTINUED | OUTPATIENT
Start: 2024-03-14 | End: 2024-03-19 | Stop reason: HOSPADM

## 2024-03-14 RX ORDER — SODIUM CHLORIDE 0.9 % (FLUSH) 0.9 %
5-40 SYRINGE (ML) INJECTION PRN
Status: DISCONTINUED | OUTPATIENT
Start: 2024-03-14 | End: 2024-03-19 | Stop reason: HOSPADM

## 2024-03-14 RX ORDER — PANTOPRAZOLE SODIUM 40 MG/1
40 TABLET, DELAYED RELEASE ORAL
Status: DISCONTINUED | OUTPATIENT
Start: 2024-03-15 | End: 2024-03-19 | Stop reason: HOSPADM

## 2024-03-14 RX ORDER — ACETAMINOPHEN 325 MG/1
650 TABLET ORAL EVERY 6 HOURS PRN
Status: DISCONTINUED | OUTPATIENT
Start: 2024-03-14 | End: 2024-03-19 | Stop reason: HOSPADM

## 2024-03-14 RX ORDER — MAGNESIUM SULFATE IN WATER 40 MG/ML
2000 INJECTION, SOLUTION INTRAVENOUS PRN
Status: DISCONTINUED | OUTPATIENT
Start: 2024-03-14 | End: 2024-03-19 | Stop reason: HOSPADM

## 2024-03-14 RX ORDER — SODIUM CHLORIDE 9 MG/ML
INJECTION, SOLUTION INTRAVENOUS PRN
Status: DISCONTINUED | OUTPATIENT
Start: 2024-03-14 | End: 2024-03-19 | Stop reason: HOSPADM

## 2024-03-14 RX ORDER — DOCUSATE SODIUM 100 MG/1
100 CAPSULE, LIQUID FILLED ORAL PRN
Status: DISCONTINUED | OUTPATIENT
Start: 2024-03-14 | End: 2024-03-19 | Stop reason: HOSPADM

## 2024-03-14 RX ORDER — HYDRALAZINE HYDROCHLORIDE 25 MG/1
25 TABLET, FILM COATED ORAL 3 TIMES DAILY
Status: DISCONTINUED | OUTPATIENT
Start: 2024-03-14 | End: 2024-03-19 | Stop reason: HOSPADM

## 2024-03-14 RX ORDER — VITAMIN B COMPLEX
2000 TABLET ORAL DAILY
Status: DISCONTINUED | OUTPATIENT
Start: 2024-03-14 | End: 2024-03-19 | Stop reason: HOSPADM

## 2024-03-14 RX ORDER — DEXTROSE MONOHYDRATE 100 MG/ML
INJECTION, SOLUTION INTRAVENOUS CONTINUOUS PRN
Status: DISCONTINUED | OUTPATIENT
Start: 2024-03-14 | End: 2024-03-19 | Stop reason: HOSPADM

## 2024-03-14 RX ORDER — ROSUVASTATIN CALCIUM 10 MG/1
20 TABLET, COATED ORAL NIGHTLY
Status: DISCONTINUED | OUTPATIENT
Start: 2024-03-14 | End: 2024-03-15

## 2024-03-14 RX ORDER — SODIUM CHLORIDE 0.9 % (FLUSH) 0.9 %
5-40 SYRINGE (ML) INJECTION EVERY 12 HOURS SCHEDULED
Status: DISCONTINUED | OUTPATIENT
Start: 2024-03-14 | End: 2024-03-19 | Stop reason: HOSPADM

## 2024-03-14 RX ORDER — ISOSORBIDE DINITRATE 10 MG/1
10 TABLET ORAL 3 TIMES DAILY
Status: DISCONTINUED | OUTPATIENT
Start: 2024-03-14 | End: 2024-03-19 | Stop reason: HOSPADM

## 2024-03-14 RX ORDER — TRAMADOL HYDROCHLORIDE 50 MG/1
50 TABLET ORAL EVERY 6 HOURS PRN
Status: DISCONTINUED | OUTPATIENT
Start: 2024-03-14 | End: 2024-03-18

## 2024-03-14 RX ORDER — INSULIN LISPRO 100 [IU]/ML
0-4 INJECTION, SOLUTION INTRAVENOUS; SUBCUTANEOUS NIGHTLY
Status: DISCONTINUED | OUTPATIENT
Start: 2024-03-14 | End: 2024-03-19 | Stop reason: HOSPADM

## 2024-03-14 RX ORDER — OXYCODONE HYDROCHLORIDE AND ACETAMINOPHEN 5; 325 MG/1; MG/1
1 TABLET ORAL
Status: COMPLETED | OUTPATIENT
Start: 2024-03-14 | End: 2024-03-14

## 2024-03-14 RX ORDER — ONDANSETRON 4 MG/1
4 TABLET, ORALLY DISINTEGRATING ORAL EVERY 8 HOURS PRN
Status: DISCONTINUED | OUTPATIENT
Start: 2024-03-14 | End: 2024-03-19 | Stop reason: HOSPADM

## 2024-03-14 RX ORDER — POLYETHYLENE GLYCOL 3350 17 G/17G
17 POWDER, FOR SOLUTION ORAL DAILY PRN
Status: DISCONTINUED | OUTPATIENT
Start: 2024-03-14 | End: 2024-03-19 | Stop reason: HOSPADM

## 2024-03-14 RX ORDER — INSULIN LISPRO 100 [IU]/ML
0-8 INJECTION, SOLUTION INTRAVENOUS; SUBCUTANEOUS
Status: DISCONTINUED | OUTPATIENT
Start: 2024-03-14 | End: 2024-03-19 | Stop reason: HOSPADM

## 2024-03-14 RX ORDER — ONDANSETRON 2 MG/ML
4 INJECTION INTRAMUSCULAR; INTRAVENOUS EVERY 6 HOURS PRN
Status: DISCONTINUED | OUTPATIENT
Start: 2024-03-14 | End: 2024-03-19 | Stop reason: HOSPADM

## 2024-03-14 RX ORDER — MORPHINE SULFATE 2 MG/ML
2 INJECTION, SOLUTION INTRAMUSCULAR; INTRAVENOUS
Status: DISCONTINUED | OUTPATIENT
Start: 2024-03-14 | End: 2024-03-14

## 2024-03-14 RX ORDER — AMLODIPINE BESYLATE 5 MG/1
10 TABLET ORAL DAILY
Status: DISCONTINUED | OUTPATIENT
Start: 2024-03-14 | End: 2024-03-19 | Stop reason: HOSPADM

## 2024-03-14 RX ORDER — POTASSIUM CHLORIDE 750 MG/1
40 TABLET, FILM COATED, EXTENDED RELEASE ORAL PRN
Status: DISCONTINUED | OUTPATIENT
Start: 2024-03-14 | End: 2024-03-19 | Stop reason: HOSPADM

## 2024-03-14 RX ORDER — FERROUS SULFATE 325(65) MG
325 TABLET ORAL
Status: DISCONTINUED | OUTPATIENT
Start: 2024-03-15 | End: 2024-03-19 | Stop reason: HOSPADM

## 2024-03-14 RX ADMIN — CARVEDILOL 25 MG: 12.5 TABLET, FILM COATED ORAL at 21:17

## 2024-03-14 RX ADMIN — ROSUVASTATIN 20 MG: 10 TABLET, FILM COATED ORAL at 21:20

## 2024-03-14 RX ADMIN — OXYCODONE HYDROCHLORIDE AND ACETAMINOPHEN 1 TABLET: 5; 325 TABLET ORAL at 14:05

## 2024-03-14 RX ADMIN — Medication 2000 UNITS: at 21:35

## 2024-03-14 RX ADMIN — SACUBITRIL AND VALSARTAN 1 TABLET: 49; 51 TABLET, FILM COATED ORAL at 21:27

## 2024-03-14 RX ADMIN — AMLODIPINE BESYLATE 10 MG: 5 TABLET ORAL at 21:30

## 2024-03-14 RX ADMIN — SEVELAMER CARBONATE 800 MG: 800 TABLET, FILM COATED ORAL at 21:28

## 2024-03-14 RX ADMIN — SODIUM CHLORIDE, PRESERVATIVE FREE 10 ML: 5 INJECTION INTRAVENOUS at 21:21

## 2024-03-14 ASSESSMENT — PAIN DESCRIPTION - DESCRIPTORS
DESCRIPTORS: DISCOMFORT
DESCRIPTORS: ACHING
DESCRIPTORS: DISCOMFORT

## 2024-03-14 ASSESSMENT — PAIN DESCRIPTION - LOCATION
LOCATION: HIP
LOCATION: ABDOMEN
LOCATION: HIP

## 2024-03-14 ASSESSMENT — PAIN SCALES - GENERAL
PAINLEVEL_OUTOF10: 6
PAINLEVEL_OUTOF10: 0
PAINLEVEL_OUTOF10: 6
PAINLEVEL_OUTOF10: 8

## 2024-03-14 ASSESSMENT — PAIN - FUNCTIONAL ASSESSMENT
PAIN_FUNCTIONAL_ASSESSMENT: PREVENTS OR INTERFERES SOME ACTIVE ACTIVITIES AND ADLS

## 2024-03-14 ASSESSMENT — PAIN DESCRIPTION - ONSET: ONSET: GRADUAL

## 2024-03-14 ASSESSMENT — PAIN DESCRIPTION - ORIENTATION
ORIENTATION: MID
ORIENTATION: RIGHT

## 2024-03-14 NOTE — ED TRIAGE NOTES
Pt arrives from home via EMS with cc of GLF with assoc right hip pain. This occurred last night at around 0100. No LOC or head injury.

## 2024-03-14 NOTE — ED NOTES
Bedside and Verbal shift change report given to MARYLOU Porter (oncoming nurse) by MARYLOU Smith (offgoing nurse). Report included the following information ED SBAR, MAR, Recent Results, and Med Rec Status.

## 2024-03-14 NOTE — ED PROVIDER NOTES
[facility] EMERGENCY DEP  EMERGENCY DEPARTMENT ENCOUNTER      Pt Name: Sherly Chakraborty  MRN: [id number]  Birthdate 1955  Date of evaluation: 3/14/2024  Provider: Gui Kruger MD    CHIEF COMPLAINT       Chief Complaint   Patient presents with    Hip Pain         HISTORY OF PRESENT ILLNESS    HPI  This is a 69-year-old female who is renal dialysis patient who fell this morning and injured her right hip.  She has not been able to get up and ambulate since the fall.  She denies any other injury and is complaining of pain in the hip up to an 8.  She voices no other acute complaint and arrives by EMS for further evaluation.    Review of External Medical Records:     Nursing Notes were reviewed.    REVIEW OF SYSTEMS       Review of Systems   Constitutional:  Negative for activity change, chills, fatigue and fever.   HENT:  Negative for congestion, ear pain, rhinorrhea, sinus pressure, sinus pain, sore throat and trouble swallowing.    Eyes: Negative.    Respiratory:  Negative for cough, chest tightness, shortness of breath, wheezing and stridor.    Cardiovascular:  Negative for chest pain, palpitations and leg swelling.   Gastrointestinal:  Negative for abdominal pain, blood in stool, diarrhea, nausea and vomiting.   Endocrine: Negative.    Genitourinary:  Negative for decreased urine volume, difficulty urinating, flank pain, frequency and hematuria.   Musculoskeletal:  Negative for back pain, joint swelling and neck pain.        See HPI   Skin:  Negative for color change, pallor and rash.   Neurological:  Negative for dizziness, syncope, speech difficulty, weakness, numbness and headaches.   Psychiatric/Behavioral:  Negative for agitation and behavioral problems.              PAST MEDICAL HISTORY     Past Medical History:   Diagnosis Date    Blind     CKD (chronic kidney disease)     HTN (hypertension)     Retinopathy     Type II or unspecified type diabetes mellitus with renal manifestations, uncontrolled(250.42)

## 2024-03-14 NOTE — ED NOTES
This RN and MARYLOU Levy attempted to ambulate patient per MD Slick orders. The patient was unable to get out of bed to ambulate. She expressed that she was in severe pain when attempting to swing her legs over the bed in attempts to stand. MD Slick and Charge RN (Alice) notified.

## 2024-03-14 NOTE — H&P
edema  Neuro/Psych: Pleasant mood and affect, CN 2-12 grossly intact, sensory grossly within normal limit, Strength 5/5 in all extremities, DTR 1+ x 4  Cap refill: Brisk, less than 3 seconds  Pulses: 2+, symmetric in all extremities  Skin: Warm, dry, without rashes or lesions    Data Review:   I have independently reviewed and interpreted patient's lab and all other diagnostic data    Abnormal Labs Reviewed - No abnormal labs to display    [unfilled]    IMAGING:   CT PELVIS WO CONTRAST Additional Contrast? None   Final Result   Acute comminuted nondisplaced right inferior pubic ramus fracture.       XR HIP 2-3 VW W PELVIS RIGHT   Final Result   Given severe osteopenia no acute fracture.      XR CHEST PORTABLE    (Results Pending)        ECG/ECHO:  [unfilled]       Notes reviewed from all clinical/nonclinical/nursing services involved in patient's clinical care. Care coordination discussions were held with appropriate clinical/nonclinical/ nursing providers based on care coordination needs.     Assessment:   Given the patient's current clinical presentation, there is a high level of concern for decompensation if discharged from the emergency department. Complex decision making was performed, which includes reviewing the patient's available past medical records, laboratory results, and imaging studies.    Principal Problem:    Acute comminuted nondisplaced right inferior pubic ramus fracture secondary to fall  ESRD on HD on MWF  Chronic HFrEF EF 20-25% s/p ICD  Hx of CAD s/p CABG  HTN  T2DM  Anemia of chronic disease  Legally blind both eyes  Debility      Plan:     Acute comminuted nondisplaced right inferior pubic ramus fracture secondary to fall  -Admit patient to orthopedic floor  -prn pain meds; tylenol, Ultram  -check orthostatic BP  -bedrest,   -fall precaution  -consult to orthopedic surgeon    ESRD on HD on MWF  -last HD was on 3/13  -Continue home vitamin D and Renvela  -consult to nephrologist to

## 2024-03-14 NOTE — ED NOTES
This RN spoke on the telephone with the patient's daughter who stated the pt would need to come live with her after her hospitalization and rehab at [address] [place] [id number].

## 2024-03-15 PROBLEM — S32.591A INFERIOR PUBIC RAMUS FRACTURE, RIGHT, CLOSED, INITIAL ENCOUNTER (HCC): Status: ACTIVE | Noted: 2024-03-15

## 2024-03-15 LAB
ALBUMIN SERPL-MCNC: 3.2 G/DL (ref 3.5–5)
ALBUMIN/GLOB SERPL: 1.1 (ref 1.1–2.2)
ALP SERPL-CCNC: 125 U/L (ref 45–117)
ALT SERPL-CCNC: 24 U/L (ref 12–78)
ANION GAP SERPL CALC-SCNC: 9 MMOL/L (ref 5–15)
AST SERPL-CCNC: 21 U/L (ref 15–37)
BASOPHILS # BLD: 0.1 K/UL (ref 0–0.1)
BASOPHILS NFR BLD: 1 % (ref 0–1)
BILIRUB SERPL-MCNC: 0.9 MG/DL (ref 0.2–1)
BUN SERPL-MCNC: 44 MG/DL (ref 6–20)
BUN/CREAT SERPL: 6 (ref 12–20)
CALCIUM SERPL-MCNC: 8.4 MG/DL (ref 8.5–10.1)
CHLORIDE SERPL-SCNC: 98 MMOL/L (ref 97–108)
CO2 SERPL-SCNC: 29 MMOL/L (ref 21–32)
CREAT SERPL-MCNC: 7.24 MG/DL (ref 0.55–1.02)
DIFFERENTIAL METHOD BLD: ABNORMAL
EOSINOPHIL # BLD: 0.4 K/UL (ref 0–0.4)
EOSINOPHIL NFR BLD: 6 % (ref 0–7)
ERYTHROCYTE [DISTWIDTH] IN BLOOD BY AUTOMATED COUNT: 16.9 % (ref 11.5–14.5)
EST. AVERAGE GLUCOSE BLD GHB EST-MCNC: 163 MG/DL
GLOBULIN SER CALC-MCNC: 3 G/DL (ref 2–4)
GLUCOSE BLD STRIP.AUTO-MCNC: 205 MG/DL (ref 65–117)
GLUCOSE BLD STRIP.AUTO-MCNC: 210 MG/DL (ref 65–117)
GLUCOSE BLD STRIP.AUTO-MCNC: 225 MG/DL (ref 65–117)
GLUCOSE BLD STRIP.AUTO-MCNC: 89 MG/DL (ref 65–117)
GLUCOSE SERPL-MCNC: 250 MG/DL (ref 65–100)
HBA1C MFR BLD: 7.3 % (ref 4–5.6)
HCT VFR BLD AUTO: 35.7 % (ref 35–47)
HGB BLD-MCNC: 11.4 G/DL (ref 11.5–16)
IMM GRANULOCYTES # BLD AUTO: 0 K/UL (ref 0–0.04)
IMM GRANULOCYTES NFR BLD AUTO: 0 % (ref 0–0.5)
LYMPHOCYTES # BLD: 0.6 K/UL (ref 0.8–3.5)
LYMPHOCYTES NFR BLD: 9 % (ref 12–49)
MCH RBC QN AUTO: 32.6 PG (ref 26–34)
MCHC RBC AUTO-ENTMCNC: 31.9 G/DL (ref 30–36.5)
MCV RBC AUTO: 102 FL (ref 80–99)
MONOCYTES # BLD: 0.4 K/UL (ref 0–1)
MONOCYTES NFR BLD: 7 % (ref 5–13)
NEUTS SEG # BLD: 4.7 K/UL (ref 1.8–8)
NEUTS SEG NFR BLD: 77 % (ref 32–75)
NRBC # BLD: 0 K/UL (ref 0–0.01)
NRBC BLD-RTO: 0 PER 100 WBC
PLATELET # BLD AUTO: 104 K/UL (ref 150–400)
PMV BLD AUTO: 11.7 FL (ref 8.9–12.9)
POTASSIUM SERPL-SCNC: 4.6 MMOL/L (ref 3.5–5.1)
PROT SERPL-MCNC: 6.2 G/DL (ref 6.4–8.2)
RBC # BLD AUTO: 3.5 M/UL (ref 3.8–5.2)
RBC MORPH BLD: ABNORMAL
SERVICE CMNT-IMP: ABNORMAL
SERVICE CMNT-IMP: NORMAL
SODIUM SERPL-SCNC: 136 MMOL/L (ref 136–145)
WBC # BLD AUTO: 6.2 K/UL (ref 3.6–11)

## 2024-03-15 PROCEDURE — 82962 GLUCOSE BLOOD TEST: CPT

## 2024-03-15 PROCEDURE — 90935 HEMODIALYSIS ONE EVALUATION: CPT

## 2024-03-15 PROCEDURE — 97161 PT EVAL LOW COMPLEX 20 MIN: CPT

## 2024-03-15 PROCEDURE — 2580000003 HC RX 258: Performed by: HOSPITALIST

## 2024-03-15 PROCEDURE — 6370000000 HC RX 637 (ALT 250 FOR IP): Performed by: HOSPITALIST

## 2024-03-15 PROCEDURE — 97530 THERAPEUTIC ACTIVITIES: CPT

## 2024-03-15 PROCEDURE — 80053 COMPREHEN METABOLIC PANEL: CPT

## 2024-03-15 PROCEDURE — 83036 HEMOGLOBIN GLYCOSYLATED A1C: CPT

## 2024-03-15 PROCEDURE — 85025 COMPLETE CBC W/AUTO DIFF WBC: CPT

## 2024-03-15 PROCEDURE — 1100000000 HC RM PRIVATE

## 2024-03-15 PROCEDURE — 97165 OT EVAL LOW COMPLEX 30 MIN: CPT

## 2024-03-15 PROCEDURE — 86704 HEP B CORE ANTIBODY TOTAL: CPT

## 2024-03-15 PROCEDURE — 51798 US URINE CAPACITY MEASURE: CPT

## 2024-03-15 PROCEDURE — 5A1D70Z PERFORMANCE OF URINARY FILTRATION, INTERMITTENT, LESS THAN 6 HOURS PER DAY: ICD-10-PCS | Performed by: INTERNAL MEDICINE

## 2024-03-15 PROCEDURE — 36415 COLL VENOUS BLD VENIPUNCTURE: CPT

## 2024-03-15 RX ORDER — HYDROXYZINE 50 MG/1
25 TABLET, FILM COATED ORAL 3 TIMES DAILY PRN
Status: DISCONTINUED | OUTPATIENT
Start: 2024-03-15 | End: 2024-03-19 | Stop reason: HOSPADM

## 2024-03-15 RX ORDER — OXYCODONE HYDROCHLORIDE AND ACETAMINOPHEN 5; 325 MG/1; MG/1
1 TABLET ORAL EVERY 8 HOURS PRN
Status: DISCONTINUED | OUTPATIENT
Start: 2024-03-15 | End: 2024-03-18

## 2024-03-15 RX ORDER — OXYCODONE HYDROCHLORIDE AND ACETAMINOPHEN 5; 325 MG/1; MG/1
1 TABLET ORAL EVERY 8 HOURS PRN
Status: DISCONTINUED | OUTPATIENT
Start: 2024-03-15 | End: 2024-03-15

## 2024-03-15 RX ORDER — ROSUVASTATIN CALCIUM 10 MG/1
10 TABLET, COATED ORAL NIGHTLY
Status: DISCONTINUED | OUTPATIENT
Start: 2024-03-15 | End: 2024-03-19 | Stop reason: HOSPADM

## 2024-03-15 RX ADMIN — SODIUM CHLORIDE, PRESERVATIVE FREE 10 ML: 5 INJECTION INTRAVENOUS at 10:05

## 2024-03-15 RX ADMIN — INSULIN LISPRO 2 UNITS: 100 INJECTION, SOLUTION INTRAVENOUS; SUBCUTANEOUS at 15:33

## 2024-03-15 RX ADMIN — PANTOPRAZOLE SODIUM 40 MG: 40 TABLET, DELAYED RELEASE ORAL at 06:08

## 2024-03-15 RX ADMIN — TRAMADOL HYDROCHLORIDE 100 MG: 50 TABLET ORAL at 10:02

## 2024-03-15 RX ADMIN — TRAMADOL HYDROCHLORIDE 50 MG: 50 TABLET ORAL at 01:00

## 2024-03-15 RX ADMIN — CARVEDILOL 25 MG: 12.5 TABLET, FILM COATED ORAL at 10:05

## 2024-03-15 RX ADMIN — SACUBITRIL AND VALSARTAN 1 TABLET: 49; 51 TABLET, FILM COATED ORAL at 10:07

## 2024-03-15 ASSESSMENT — PAIN DESCRIPTION - DESCRIPTORS: DESCRIPTORS: ACHING

## 2024-03-15 ASSESSMENT — PAIN SCALES - GENERAL
PAINLEVEL_OUTOF10: 0
PAINLEVEL_OUTOF10: 6
PAINLEVEL_OUTOF10: 0
PAINLEVEL_OUTOF10: 0
PAINLEVEL_OUTOF10: 6

## 2024-03-15 ASSESSMENT — PAIN DESCRIPTION - LOCATION: LOCATION: HIP

## 2024-03-15 NOTE — CARE COORDINATION
CM notified by therapy that Pt will likely need SNF at d/c (broken pelvis).     The patient cannot see (blind) and has been living with nettie and son in law prior, they provide substantial assistance for ADLs and IADL.

## 2024-03-15 NOTE — ED NOTES
ED TO INPATIENT SBAR HANDOFF    Patient Name: Sherly Chakraborty   :  1955  69 y.o.   MRN:  [id number]  ED Room #:  ER25/25  Family/Caregiver Present no   Restraints no   Sitter no   Sepsis Risk Score Sepsis Risk Score: 4.47    Situation  Code Status: Full Code     Allergies: Patient has no known allergies.  Weight: No data found.  Arrived from: home  Chief Complaint:   Chief Complaint   Patient presents with    Hip Pain     Hospital Problem/Diagnosis:  Principal Problem:    Stress fracture of right pubic ramus  Resolved Problems:    * No resolved hospital problems. *    Imaging:   XR CHEST PORTABLE   Final Result   1. Enlarged cardiac silhouette, otherwise no acute disease         CT PELVIS WO CONTRAST Additional Contrast? None   Final Result   Acute comminuted nondisplaced right inferior pubic ramus fracture.       XR HIP 2-3 VW W PELVIS RIGHT   Final Result   Given severe osteopenia no acute fracture.        Abnormal labs:   Abnormal Labs Reviewed   CBC WITH AUTO DIFFERENTIAL - Abnormal; Notable for the following components:       Result Value    RBC 3.37 (*)     Hemoglobin 10.9 (*)     .9 (*)     RDW 16.9 (*)     Platelets 105 (*)     Lymphocytes % 9 (*)     Lymphocytes Absolute 0.6 (*)     All other components within normal limits   COMPREHENSIVE METABOLIC PANEL - Abnormal; Notable for the following components:    Sodium 134 (*)     Glucose 332 (*)     BUN 39 (*)     Creatinine 6.84 (*)     Bun/Cre Ratio 6 (*)     Est, Glom Filt Rate 6 (*)     Alk Phosphatase 135 (*)     Albumin 3.2 (*)     Albumin/Globulin Ratio 0.9 (*)     All other components within normal limits   POCT GLUCOSE - Abnormal; Notable for the following components:    POC Glucose 267 (*)     All other components within normal limits         Abnormal Assessment Findings: nondisplaced right ramus fracture, HTN, blind both eyes     Background  History:   Past Medical History:   Diagnosis Date    Blind     CKD (chronic kidney disease)

## 2024-03-15 NOTE — ED NOTES
Floor is informed about the need to obtain pt's weight. Unable to obtain in ED due to non weight stretcher and pt inability to stand up

## 2024-03-16 LAB
GLUCOSE BLD STRIP.AUTO-MCNC: 125 MG/DL (ref 65–117)
GLUCOSE BLD STRIP.AUTO-MCNC: 138 MG/DL (ref 65–117)
GLUCOSE BLD STRIP.AUTO-MCNC: 152 MG/DL (ref 65–117)
SERVICE CMNT-IMP: ABNORMAL

## 2024-03-16 PROCEDURE — 82962 GLUCOSE BLOOD TEST: CPT

## 2024-03-16 PROCEDURE — 76937 US GUIDE VASCULAR ACCESS: CPT

## 2024-03-16 PROCEDURE — 2580000003 HC RX 258: Performed by: HOSPITALIST

## 2024-03-16 PROCEDURE — 1100000000 HC RM PRIVATE

## 2024-03-16 PROCEDURE — 6360000002 HC RX W HCPCS: Performed by: HOSPITALIST

## 2024-03-16 PROCEDURE — 6370000000 HC RX 637 (ALT 250 FOR IP): Performed by: HOSPITALIST

## 2024-03-16 RX ADMIN — SACUBITRIL AND VALSARTAN 1 TABLET: 49; 51 TABLET, FILM COATED ORAL at 10:09

## 2024-03-16 RX ADMIN — AMLODIPINE BESYLATE 10 MG: 5 TABLET ORAL at 09:16

## 2024-03-16 RX ADMIN — ISOSORBIDE DINITRATE 10 MG: 5 TABLET ORAL at 02:26

## 2024-03-16 RX ADMIN — ISOSORBIDE DINITRATE 10 MG: 5 TABLET ORAL at 20:22

## 2024-03-16 RX ADMIN — CARVEDILOL 25 MG: 12.5 TABLET, FILM COATED ORAL at 09:17

## 2024-03-16 RX ADMIN — ROSUVASTATIN 10 MG: 10 TABLET, FILM COATED ORAL at 20:21

## 2024-03-16 RX ADMIN — SACUBITRIL AND VALSARTAN 1 TABLET: 49; 51 TABLET, FILM COATED ORAL at 00:09

## 2024-03-16 RX ADMIN — CARVEDILOL 25 MG: 12.5 TABLET, FILM COATED ORAL at 17:05

## 2024-03-16 RX ADMIN — ONDANSETRON 4 MG: 2 INJECTION INTRAMUSCULAR; INTRAVENOUS at 12:50

## 2024-03-16 RX ADMIN — SODIUM CHLORIDE, PRESERVATIVE FREE 10 ML: 5 INJECTION INTRAVENOUS at 09:17

## 2024-03-16 RX ADMIN — PANTOPRAZOLE SODIUM 40 MG: 40 TABLET, DELAYED RELEASE ORAL at 05:24

## 2024-03-16 RX ADMIN — SEVELAMER CARBONATE 800 MG: 800 TABLET, FILM COATED ORAL at 09:17

## 2024-03-16 RX ADMIN — HYDRALAZINE HYDROCHLORIDE 25 MG: 25 TABLET ORAL at 09:16

## 2024-03-16 RX ADMIN — SACUBITRIL AND VALSARTAN 1 TABLET: 49; 51 TABLET, FILM COATED ORAL at 20:21

## 2024-03-16 RX ADMIN — ROSUVASTATIN 10 MG: 10 TABLET, FILM COATED ORAL at 00:09

## 2024-03-16 RX ADMIN — ISOSORBIDE DINITRATE 10 MG: 5 TABLET ORAL at 09:17

## 2024-03-16 RX ADMIN — HYDRALAZINE HYDROCHLORIDE 25 MG: 25 TABLET ORAL at 02:26

## 2024-03-16 RX ADMIN — Medication 2000 UNITS: at 09:17

## 2024-03-16 RX ADMIN — SEVELAMER CARBONATE 800 MG: 800 TABLET, FILM COATED ORAL at 17:06

## 2024-03-16 RX ADMIN — FERROUS SULFATE TAB 325 MG (65 MG ELEMENTAL FE) 325 MG: 325 (65 FE) TAB at 09:17

## 2024-03-16 RX ADMIN — HYDRALAZINE HYDROCHLORIDE 25 MG: 25 TABLET ORAL at 20:21

## 2024-03-16 RX ADMIN — TRAMADOL HYDROCHLORIDE 50 MG: 50 TABLET ORAL at 09:17

## 2024-03-16 ASSESSMENT — PAIN DESCRIPTION - ORIENTATION: ORIENTATION: RIGHT

## 2024-03-16 ASSESSMENT — PAIN DESCRIPTION - DESCRIPTORS: DESCRIPTORS: ACHING

## 2024-03-16 ASSESSMENT — PAIN SCALES - GENERAL
PAINLEVEL_OUTOF10: 6
PAINLEVEL_OUTOF10: 3

## 2024-03-16 ASSESSMENT — PAIN DESCRIPTION - LOCATION: LOCATION: HIP

## 2024-03-16 NOTE — PROCEDURES
Vascular Access Team - peripheral IV catheter insertion note     A 22 gauge, 25 mm (1 inch) PIV was inserted into the left ventral forearm using ultrasound guidance. The IV flushed easily and had brisk blood return. The patient tolerated the procedure well.   Mic Figueroa RN

## 2024-03-16 NOTE — CARE COORDINATION
Care Management Initial Assessment       RUR:20%  Readmission? No  1st IM letter given? Yes   1st  letter given: Yes      03/16/24 1443   Service Assessment   Patient Orientation Unable to Assess   Cognition Other (see comment)  (unable to assess)   Primary Caregiver   (adult daughter and son in law)   Patient's Healthcare Decision Maker is: Legal Next of Kin   PCP Verified by CM Yes   Last Visit to PCP Within last 6 months   Can patient return to prior living arrangement Unknown at present   Ability to make needs known: Other (see comment)  (spoke with vonnie on KATHY needs)   Would you like for me to discuss the discharge plan with any other family members/significant others, and if so, who? No  (per daughter on requesting SNF list)     This CM spoke with daughter Laness Young, [phone] in regards to dc planning and transition of care needs.  Per daughter patient has been living with her at [address] [place] [id number].  Daughter also transport patient and manages getting medications at [facility] the [facility].   Daughter explained that patient does not have any assistance in the home other than family.  Patient does not have a history of SNF placement.  Daughter did request list for SNF, which was left for patient/ daughter to review.  Daughter did mention that she has worked [facility], but wanted to review list.     Patient DME includes wheel chair, cane and bedside commode. Patient is not on oxygen or CPAP.    CM to follow for needs.  Patient will require authorization for SNF placement.     GISSELLE ARCHIBALD  2:56 PM

## 2024-03-16 NOTE — FLOWSHEET NOTE
Pt tolerated well; circuit starting to clot with 30 min remaining. Contacted NP Laurent Alex who ordered tx be stopped and bld returned to pt if possible. UF goal met, total tx time 180 min. Hep B status drawn and sent to lab. No results. Machine acid cleaned and bleached per p&p.     03/15/24 2015   Vital Signs   BP (!) 133/58   Temp 97.1 °F (36.2 °C)   Pulse 79   Respirations 18   SpO2 98 %   Pain Assessment   Pain Assessment None - Denies Pain   Pain Level 0   Response to Pain Intervention Patient satisfied   Treatment   Time On 2015   Treatment Goal 1-2L / 3.5 Hrs   Observations & Evaluations   Level of Consciousness 0  (Simultaneous filing. User may not have seen previous data.)   Oriented X 2   Heart Rhythm Regular   Respiratory Quality/Effort Unlabored;Other (Comment)  (A lot of coughing)   O2 Device None (Room air)   Bilateral Breath Sounds Clear   Skin Color Other (comment)  (Appropriate for ethnicity)   Skin Condition/Temp Warm   Edema Generalized   Edema Generalized Trace   Comments Procedure explained   Technical Checks   Dialysis Machine No. 07   RO Machine Number 07   Tubing Lot Number [id number]   All Connections Secure Yes   NS Bag Yes   Saline Line Double Clamped Yes   Dialyzer Revaclear 300   Prime Volume (mL) 200 mL   ICEBOAT I;C;E;B;O;A;T   RO Machine Log Sheet Completed Yes   Machine Alarm Self Test Completed;Passed   Air Foam Detector Tested;Proper Function;pH Reading   Extracorporeal Circuit Tested for Integrity Yes   Machine Conductivity 13.5   Manual Ph 7.2   Bleach Test (Neg) Yes   Bath Temperature 98.6 °F (37 °C)   Dialysis Bath   K+ (Potassium) 2   Ca+ (Calcium) 2.5   Na+ (Sodium) 139   HCO3 (Bicarb) 37   Bicarbonate Concentrate Lot No. [id number]   Acid Concentrate Lot No. [id number]   Treatment Initiation   Dialyze Hours 3.5   Treatment  Initiation Universal Precautions maintained;Lines secured to patient;Connections secured;Prime given;Venous Parameters set;Arterial Parameters

## 2024-03-17 LAB
GLUCOSE BLD STRIP.AUTO-MCNC: 103 MG/DL (ref 65–117)
GLUCOSE BLD STRIP.AUTO-MCNC: 118 MG/DL (ref 65–117)
GLUCOSE BLD STRIP.AUTO-MCNC: 187 MG/DL (ref 65–117)
GLUCOSE BLD STRIP.AUTO-MCNC: 212 MG/DL (ref 65–117)
SERVICE CMNT-IMP: ABNORMAL
SERVICE CMNT-IMP: NORMAL

## 2024-03-17 PROCEDURE — 2580000003 HC RX 258: Performed by: HOSPITALIST

## 2024-03-17 PROCEDURE — 6370000000 HC RX 637 (ALT 250 FOR IP): Performed by: HOSPITALIST

## 2024-03-17 PROCEDURE — 1100000000 HC RM PRIVATE

## 2024-03-17 PROCEDURE — 82962 GLUCOSE BLOOD TEST: CPT

## 2024-03-17 RX ADMIN — SODIUM CHLORIDE, PRESERVATIVE FREE 10 ML: 5 INJECTION INTRAVENOUS at 08:47

## 2024-03-17 RX ADMIN — SEVELAMER CARBONATE 800 MG: 800 TABLET, FILM COATED ORAL at 12:39

## 2024-03-17 RX ADMIN — Medication 2000 UNITS: at 08:48

## 2024-03-17 RX ADMIN — PANTOPRAZOLE SODIUM 40 MG: 40 TABLET, DELAYED RELEASE ORAL at 05:02

## 2024-03-17 RX ADMIN — HYDRALAZINE HYDROCHLORIDE 25 MG: 25 TABLET ORAL at 08:47

## 2024-03-17 RX ADMIN — SACUBITRIL AND VALSARTAN 1 TABLET: 49; 51 TABLET, FILM COATED ORAL at 20:00

## 2024-03-17 RX ADMIN — ISOSORBIDE DINITRATE 10 MG: 5 TABLET ORAL at 20:00

## 2024-03-17 RX ADMIN — CARVEDILOL 25 MG: 12.5 TABLET, FILM COATED ORAL at 17:12

## 2024-03-17 RX ADMIN — CARVEDILOL 25 MG: 12.5 TABLET, FILM COATED ORAL at 08:47

## 2024-03-17 RX ADMIN — SEVELAMER CARBONATE 800 MG: 800 TABLET, FILM COATED ORAL at 17:12

## 2024-03-17 RX ADMIN — ISOSORBIDE DINITRATE 10 MG: 5 TABLET ORAL at 08:47

## 2024-03-17 RX ADMIN — HYDRALAZINE HYDROCHLORIDE 25 MG: 25 TABLET ORAL at 20:00

## 2024-03-17 RX ADMIN — INSULIN LISPRO 2 UNITS: 100 INJECTION, SOLUTION INTRAVENOUS; SUBCUTANEOUS at 17:12

## 2024-03-17 RX ADMIN — ROSUVASTATIN 10 MG: 10 TABLET, FILM COATED ORAL at 20:00

## 2024-03-17 RX ADMIN — AMLODIPINE BESYLATE 10 MG: 5 TABLET ORAL at 08:47

## 2024-03-17 RX ADMIN — FERROUS SULFATE TAB 325 MG (65 MG ELEMENTAL FE) 325 MG: 325 (65 FE) TAB at 08:46

## 2024-03-17 RX ADMIN — ISOSORBIDE DINITRATE 10 MG: 5 TABLET ORAL at 14:40

## 2024-03-17 RX ADMIN — SEVELAMER CARBONATE 800 MG: 800 TABLET, FILM COATED ORAL at 08:47

## 2024-03-17 RX ADMIN — SACUBITRIL AND VALSARTAN 1 TABLET: 49; 51 TABLET, FILM COATED ORAL at 08:47

## 2024-03-17 RX ADMIN — SODIUM CHLORIDE, PRESERVATIVE FREE 10 ML: 5 INJECTION INTRAVENOUS at 20:00

## 2024-03-17 RX ADMIN — HYDRALAZINE HYDROCHLORIDE 25 MG: 25 TABLET ORAL at 12:39

## 2024-03-18 PROBLEM — Z72.820 POOR SLEEP: Status: ACTIVE | Noted: 2024-03-18

## 2024-03-18 PROBLEM — R26.2 INABILITY TO WALK: Status: ACTIVE | Noted: 2024-03-18

## 2024-03-18 PROBLEM — R10.2 PELVIC PAIN: Status: ACTIVE | Noted: 2024-03-18

## 2024-03-18 PROBLEM — R63.0 POOR APPETITE: Status: ACTIVE | Noted: 2024-03-18

## 2024-03-18 PROBLEM — Z71.89 GOALS OF CARE, COUNSELING/DISCUSSION: Status: ACTIVE | Noted: 2024-03-18

## 2024-03-18 LAB
ALBUMIN SERPL-MCNC: 2.6 G/DL (ref 3.5–5)
ANION GAP SERPL CALC-SCNC: 9 MMOL/L (ref 5–15)
BASOPHILS # BLD: 0.1 K/UL (ref 0–0.1)
BASOPHILS NFR BLD: 1 % (ref 0–1)
BUN SERPL-MCNC: 46 MG/DL (ref 6–20)
BUN/CREAT SERPL: 5 (ref 12–20)
CALCIUM SERPL-MCNC: 8 MG/DL (ref 8.5–10.1)
CHLORIDE SERPL-SCNC: 101 MMOL/L (ref 97–108)
CO2 SERPL-SCNC: 26 MMOL/L (ref 21–32)
CREAT SERPL-MCNC: 8.46 MG/DL (ref 0.55–1.02)
DIFFERENTIAL METHOD BLD: ABNORMAL
EOSINOPHIL # BLD: 0.3 K/UL (ref 0–0.4)
EOSINOPHIL NFR BLD: 5 % (ref 0–7)
ERYTHROCYTE [DISTWIDTH] IN BLOOD BY AUTOMATED COUNT: 16.6 % (ref 11.5–14.5)
GLUCOSE BLD STRIP.AUTO-MCNC: 166 MG/DL (ref 65–117)
GLUCOSE BLD STRIP.AUTO-MCNC: 167 MG/DL (ref 65–117)
GLUCOSE BLD STRIP.AUTO-MCNC: 168 MG/DL (ref 65–117)
GLUCOSE BLD STRIP.AUTO-MCNC: 171 MG/DL (ref 65–117)
GLUCOSE SERPL-MCNC: 198 MG/DL (ref 65–100)
HBV SURFACE AB SER QL: REACTIVE
HBV SURFACE AB SER-ACNC: 89.54 MIU/ML
HBV SURFACE AG SER QL: <0.1 INDEX
HBV SURFACE AG SER QL: NEGATIVE
HCT VFR BLD AUTO: 29.4 % (ref 35–47)
HGB BLD-MCNC: 9.6 G/DL (ref 11.5–16)
IMM GRANULOCYTES # BLD AUTO: 0 K/UL (ref 0–0.04)
IMM GRANULOCYTES NFR BLD AUTO: 0 % (ref 0–0.5)
LYMPHOCYTES # BLD: 0.4 K/UL (ref 0.8–3.5)
LYMPHOCYTES NFR BLD: 8 % (ref 12–49)
MCH RBC QN AUTO: 33 PG (ref 26–34)
MCHC RBC AUTO-ENTMCNC: 32.7 G/DL (ref 30–36.5)
MCV RBC AUTO: 101 FL (ref 80–99)
MONOCYTES # BLD: 0.4 K/UL (ref 0–1)
MONOCYTES NFR BLD: 8 % (ref 5–13)
NEUTS SEG # BLD: 4.2 K/UL (ref 1.8–8)
NEUTS SEG NFR BLD: 78 % (ref 32–75)
NRBC # BLD: 0 K/UL (ref 0–0.01)
NRBC BLD-RTO: 0 PER 100 WBC
PHOSPHATE SERPL-MCNC: 5 MG/DL (ref 2.6–4.7)
PLATELET # BLD AUTO: 121 K/UL (ref 150–400)
PMV BLD AUTO: 12.5 FL (ref 8.9–12.9)
POTASSIUM SERPL-SCNC: 4.6 MMOL/L (ref 3.5–5.1)
RBC # BLD AUTO: 2.91 M/UL (ref 3.8–5.2)
RBC MORPH BLD: ABNORMAL
RBC MORPH BLD: ABNORMAL
SERVICE CMNT-IMP: ABNORMAL
SODIUM SERPL-SCNC: 136 MMOL/L (ref 136–145)
WBC # BLD AUTO: 5.4 K/UL (ref 3.6–11)

## 2024-03-18 PROCEDURE — 2700000000 HC OXYGEN THERAPY PER DAY

## 2024-03-18 PROCEDURE — 97110 THERAPEUTIC EXERCISES: CPT

## 2024-03-18 PROCEDURE — 6370000000 HC RX 637 (ALT 250 FOR IP): Performed by: HOSPITALIST

## 2024-03-18 PROCEDURE — 97110 THERAPEUTIC EXERCISES: CPT | Performed by: OCCUPATIONAL THERAPIST

## 2024-03-18 PROCEDURE — 99223 1ST HOSP IP/OBS HIGH 75: CPT | Performed by: INTERNAL MEDICINE

## 2024-03-18 PROCEDURE — 94760 N-INVAS EAR/PLS OXIMETRY 1: CPT

## 2024-03-18 PROCEDURE — 82962 GLUCOSE BLOOD TEST: CPT

## 2024-03-18 PROCEDURE — 87340 HEPATITIS B SURFACE AG IA: CPT

## 2024-03-18 PROCEDURE — 2580000003 HC RX 258: Performed by: HOSPITALIST

## 2024-03-18 PROCEDURE — 1100000000 HC RM PRIVATE

## 2024-03-18 PROCEDURE — 36415 COLL VENOUS BLD VENIPUNCTURE: CPT

## 2024-03-18 PROCEDURE — 80069 RENAL FUNCTION PANEL: CPT

## 2024-03-18 PROCEDURE — 6370000000 HC RX 637 (ALT 250 FOR IP): Performed by: INTERNAL MEDICINE

## 2024-03-18 PROCEDURE — 86706 HEP B SURFACE ANTIBODY: CPT

## 2024-03-18 PROCEDURE — 85025 COMPLETE CBC W/AUTO DIFF WBC: CPT

## 2024-03-18 PROCEDURE — 97116 GAIT TRAINING THERAPY: CPT

## 2024-03-18 PROCEDURE — 90935 HEMODIALYSIS ONE EVALUATION: CPT

## 2024-03-18 RX ORDER — OXYCODONE HYDROCHLORIDE 5 MG/1
5 TABLET ORAL EVERY 4 HOURS PRN
Status: DISCONTINUED | OUTPATIENT
Start: 2024-03-18 | End: 2024-03-19 | Stop reason: HOSPADM

## 2024-03-18 RX ORDER — ACETAMINOPHEN 500 MG
1000 TABLET ORAL EVERY 8 HOURS SCHEDULED
Status: DISCONTINUED | OUTPATIENT
Start: 2024-03-18 | End: 2024-03-19 | Stop reason: HOSPADM

## 2024-03-18 RX ORDER — MIRTAZAPINE 15 MG/1
15 TABLET, FILM COATED ORAL NIGHTLY
Status: DISCONTINUED | OUTPATIENT
Start: 2024-03-18 | End: 2024-03-19 | Stop reason: HOSPADM

## 2024-03-18 RX ADMIN — ISOSORBIDE DINITRATE 10 MG: 5 TABLET ORAL at 14:00

## 2024-03-18 RX ADMIN — SEVELAMER CARBONATE 800 MG: 800 TABLET, FILM COATED ORAL at 16:21

## 2024-03-18 RX ADMIN — FERROUS SULFATE TAB 325 MG (65 MG ELEMENTAL FE) 325 MG: 325 (65 FE) TAB at 07:00

## 2024-03-18 RX ADMIN — MIRTAZAPINE 15 MG: 15 TABLET, FILM COATED ORAL at 21:11

## 2024-03-18 RX ADMIN — CARVEDILOL 25 MG: 12.5 TABLET, FILM COATED ORAL at 16:21

## 2024-03-18 RX ADMIN — SEVELAMER CARBONATE 800 MG: 800 TABLET, FILM COATED ORAL at 07:00

## 2024-03-18 RX ADMIN — HYDRALAZINE HYDROCHLORIDE 25 MG: 25 TABLET ORAL at 21:11

## 2024-03-18 RX ADMIN — SODIUM CHLORIDE, PRESERVATIVE FREE 10 ML: 5 INJECTION INTRAVENOUS at 21:12

## 2024-03-18 RX ADMIN — PANTOPRAZOLE SODIUM 40 MG: 40 TABLET, DELAYED RELEASE ORAL at 06:52

## 2024-03-18 RX ADMIN — SACUBITRIL AND VALSARTAN 1 TABLET: 49; 51 TABLET, FILM COATED ORAL at 22:55

## 2024-03-18 RX ADMIN — SODIUM CHLORIDE, PRESERVATIVE FREE 10 ML: 5 INJECTION INTRAVENOUS at 10:04

## 2024-03-18 RX ADMIN — ACETAMINOPHEN 1000 MG: 500 TABLET ORAL at 21:11

## 2024-03-18 RX ADMIN — ISOSORBIDE DINITRATE 10 MG: 5 TABLET ORAL at 21:11

## 2024-03-18 RX ADMIN — SACUBITRIL AND VALSARTAN 1 TABLET: 49; 51 TABLET, FILM COATED ORAL at 10:00

## 2024-03-18 RX ADMIN — ISOSORBIDE DINITRATE 10 MG: 5 TABLET ORAL at 10:00

## 2024-03-18 RX ADMIN — Medication 2000 UNITS: at 10:00

## 2024-03-18 RX ADMIN — ROSUVASTATIN 10 MG: 10 TABLET, FILM COATED ORAL at 21:11

## 2024-03-18 RX ADMIN — HYDRALAZINE HYDROCHLORIDE 25 MG: 25 TABLET ORAL at 14:00

## 2024-03-18 RX ADMIN — CARVEDILOL 25 MG: 12.5 TABLET, FILM COATED ORAL at 07:00

## 2024-03-18 RX ADMIN — AMLODIPINE BESYLATE 10 MG: 5 TABLET ORAL at 10:00

## 2024-03-18 RX ADMIN — ACETAMINOPHEN 1000 MG: 500 TABLET ORAL at 16:45

## 2024-03-18 RX ADMIN — HYDRALAZINE HYDROCHLORIDE 25 MG: 25 TABLET ORAL at 10:00

## 2024-03-18 ASSESSMENT — PAIN SCALES - GENERAL
PAINLEVEL_OUTOF10: 0
PAINLEVEL_OUTOF10: 0
PAINLEVEL_OUTOF10: 5

## 2024-03-18 ASSESSMENT — PAIN DESCRIPTION - LOCATION: LOCATION: HIP

## 2024-03-18 ASSESSMENT — PAIN DESCRIPTION - DESCRIPTORS: DESCRIPTORS: ACHING

## 2024-03-18 ASSESSMENT — PAIN DESCRIPTION - ORIENTATION: ORIENTATION: LEFT

## 2024-03-18 NOTE — CONSULTS
[facility]   [address], [place] [address]  Phone: [phone]   [phone]       NEPHROLOGY CONSULT NOTE     Patient: Sherly Chakraborty MRN: [id number]  PCP: Derick Paula MD   :     1955  Age:   69 y.o.  Sex:  female      Referring physician: Rodolfo Gramajo MD  Reason for consultation: ESRD on HD  Admission Date: 3/14/2024 12:31 PM  LOS: 1 day      ASSESSMENT and PLAN :   ESRD on HD MWF  - pt doesn't remember nephrologist; follows [facility]  - doesn't know her dialysis center  - access: [facility] AVF  - last HD 3/13  - next HD 3/15 - Tk [facility]ICKMAN aware    AOCD  - Hb stable    CKD-MBD  - renal diet     Fall/ acute R inferior pubic ramus fx  - mgt per ortho    HTN  HFrEF no flare  CAD/CABG  DM2  Legal blind    Care Plan discussed with:  pt        Thank you for consulting [facility] in the care of your patient.      Subjective:   HPI: Sherly Chakraborty is a 69 y.o. female with ESRD on HD on MWF, legally blind both eyes, chronic HFrEF last EF 20-25% in , ischemic cardiomyopathy s/p ICD, CAD s/p CABG, HTN, T2DM, anemia of chronic disease . Pt brought from home after she was unable to get up and ambulate following a fall earlier this morning at 1:30 am.  She stated that she fell after using bedside, but no head injury or loss of consciousness.  No seizure activities, left-sided chest pain, palpitation or dizziness.        Past Medical Hx:   Past Medical History:   Diagnosis Date    Blind     CKD (chronic kidney disease)     HTN (hypertension)     Retinopathy     Type II or unspecified type diabetes mellitus with renal manifestations, uncontrolled(250.42)         Past Surgical Hx:     Past Surgical History:   Procedure Laterality Date    CORONARY ARTERY BYPASS GRAFT      GYN      hysterectomy          No Known Allergies    Social Hx:  reports that she has never smoked. She has never used smokeless tobacco. She reports that she does not drink 
Palliative Medicine  Patient Name: Sherly Chakraborty  YOB: 1955  MRN: [id number]  Age: 69 y.o.  Gender: female    Date of Initial Consult: 3/18/24  Date of Service: 3/18/2024  Time: 4:25 PM  Provider: Gladys Del Valle MD  Hospital Day: 5  Admit Date: 3/14/2024  Referring Provider: Rodolfo Gramajo MD       Reasons for Consultation:  Goals of Care    HISTORY OF PRESENT ILLNESS (HPI):   Sherly Chakraborty is a 69 y.o. female with a past medical history of ESRD on HD, HfrEF (20%), CAD, HTN, DM, who was admitted on 3/14/2024 from home with a diagnosis of falls.     Upon arrival to the ER she as found to have a fracture of the inferior pubic ramus. She was admitted and evaluated by ortho -- no surgical intervention required.     Psychosocial: pt and  lives with her daughter Lanessa. She has two children Lanessa and Jorge L.       PALLIATIVE DIAGNOSES:    Palliative care encounter  Debility  ESRD  Pelvic pain  Poor sleep  Anxiety/depression    ASSESSMENT AND PLAN:   Chart reviewed including labs, notes, imaging. Met pt at bedside, she has limited ability to participate in MDM  Daughter shares over the past 2-3 months pt has not been eating and has been more withdrawn.  Family is interested in exploring hospice, however pt does not want to stop HD. We discussed that on rare occasions hospice might be agreeable to continue HD if the primary diagnosis was not related to ESRD. This would be unusual and would be very agency dependent  Hospice consult placed for information session  Poor sleep/mood/appetite  Start remeron 15mg qhs, this can be increased in 1-2 weeks  Pelvic pain  2/2 right inferior pubic ramus fx  Schedule tylenol  Stop tramadol in favor of oxycodone  Advance care planning  Meet with daughter at 10:30 am to discuss code status and AMD  Please call with any palliative questions or concerns.  Palliative Care Team is available via perfect serve or via phone.    Referrals to:   [] Outpatient 
  Signed by: KIRAN Henriquez   Orthopedic Surgery PA  Orthopedic Trauma Service  [facility]  Today's Date: March 15, 2024

## 2024-03-18 NOTE — CARE COORDINATION
Transition of Care Plan:    4:00pm-CM spoke to Sherly in admissions with [facility].  Insurance auth will be started today, 3/18 by  at [facility].  Family aware.    RUR: 13%  Prior Level of Functioning: Assistance with some ADLs.  Disposition: SNF with HD  OP HD at [facility]  If SNF or IPR: Date FOC offered:   Date FOC received:   Accepting facility: Pending-[facility] denied, [facility] accepted and [facility] pending.  CM called admissions at [facility].  Admissions director currently reviewing.  Date authorization started with reference number:   Date authorization received and expires:   Follow up appointments: PCP/Specialist  DME needed: No  Transportation at discharge: Stretcher vs.  van  IM/IMM Medicare/ letter given: 3/16  Is patient a [place] and connected with [facility]?    If yes, was [place] transfer form completed and [facility] notified?   Caregiver Contact: Lanessa Young Discharge Caregiver contacted prior to discharge? Yes  Care Conference needed? No  Barriers to discharge:  Placement    Priyanka Kohli RN/CRM  [phone]

## 2024-03-18 NOTE — CARE COORDINATION
Insurance AUTH submitted to [employer] for Medicare [employer] AUTH- and received    AUTH approved for admission 3/19 until 3/25.   AUTH # [id number]    Unit CM notified.     GISSELLE COX

## 2024-03-18 NOTE — FLOWSHEET NOTE
Primary RN SBAR: Capri Ayers RN  Comments: Tolerated treatment well .     03/18/24 1445   Vital Signs   BP (!) 154/62   Temp 98.3 °F (36.8 °C)   Pulse 83   Respirations 18   Pain Assessment   Pain Assessment None - Denies Pain   Pain Level 0   Post-Hemodialysis Assessment   Post-Treatment Procedures Blood returned;Catheter Capped, clamped with Saline x2 ports   Machine Disinfection Process Exterior Machine Disinfection   Rinseback Volume (ml) 300 ml   Blood Volume Processed (Liters) 74.3 L   Dialyzer Clearance Lightly streaked   Duration of Treatment (minutes)   (3.5 hours)   Hemodialysis Intake (ml) 500 ml   Hemodialysis Output (ml) 2500 ml   NET Removed (ml) 2000   Tolerated Treatment Good   Patient Response to Treatment Tolerated treatment well   Bilateral Breath Sounds Clear   Edema None   Physician Notified Yes   Time Off 1440   Patient Disposition   (remain in room)   Observations & Evaluations   Level of Consciousness 0   Oriented X x4   Heart Rhythm Regular   Respiratory Quality/Effort Unlabored   O2 Device None (Room air)   Skin Color Other (comment)  (appropriate for ethnicity)   Skin Condition/Temp Dry;Warm   Appetite Good   Abdomen Inspection Soft   Bowel Sounds (All Quadrants) Active

## 2024-03-18 NOTE — FLOWSHEET NOTE
Primary RN SBAR: Capri Ayers RN  Patient Education: Ordered Dialysis Treatment  Hospital associated wait time; reason: N/A    HepB S AG/AB send to lab 03/18/24 03/18/24 1110   Vital Signs   BP (!) 100/46   Temp 98.3 °F (36.8 °C)   Pulse 76   Respirations 18   Pain Assessment   Pain Assessment None - Denies Pain   Pain Level 0   Treatment   Time On 1110   Treatment Goal 1-2   Observations & Evaluations   Level of Consciousness 0   Oriented X x4   Heart Rhythm Regular   Respiratory Quality/Effort Unlabored   O2 Device None (Room air)   Bilateral Breath Sounds Clear   Skin Color Other (comment)  (appropriate for ethnicity)   Skin Condition/Temp Dry;Warm   Appetite Good   Abdomen Inspection Soft   Bowel Sounds (All Quadrants) Active   Edema None   Edema Generalized None   Technical Checks   Dialysis Machine No. 05   RO Machine Number 05   Dialyzer Lot No. [id number]   Tubing Lot Number [id number]   All Connections Secure Yes   NS Bag Yes   Saline Line Double Clamped Yes   Dialyzer Revaclear 300   Prime Volume (mL) 200 mL   ICEBOAT I;C;E;B;O;A;T   RO Machine Log Sheet Completed Yes   Machine Alarm Self Test Completed;Passed   Air Foam Detector Tested;Proper Function;pH Reading   Extracorporeal Circuit Tested for Integrity Yes   Machine Conductivity 13.8   Manual Ph 7.4   Bleach Test (Neg) Yes   Bath Temperature 98.6 °F (37 °C)   Dialysis Bath   K+ (Potassium) 2   Ca+ (Calcium) 2.5   Na+ (Sodium) 139   HCO3 (Bicarb) 37   Treatment Initiation   Dialyze Hours 3.5   Treatment  Initiation Universal Precautions maintained;Lines secured to patient;Connections secured;Prime given;Venous Parameters set;Arterial Parameters set;Air foam detector engaged;[place] Device;Revaclear Dialyzer;Saline line double clamped;REV-300   Hemodialysis Fistula/Graft Arteriovenous fistula Right Arm   Placement Date/Time: 03/15/24 1000   Present on Admission/Arrival: Yes  Access Type: Arteriovenous fistula  Orientation: Right  Access

## 2024-03-19 VITALS
OXYGEN SATURATION: 93 % | DIASTOLIC BLOOD PRESSURE: 68 MMHG | RESPIRATION RATE: 19 BRPM | HEART RATE: 86 BPM | SYSTOLIC BLOOD PRESSURE: 135 MMHG | TEMPERATURE: 98.4 F

## 2024-03-19 PROBLEM — Z71.89 ADVANCE CARE PLANNING: Status: ACTIVE | Noted: 2024-03-19

## 2024-03-19 LAB
GLUCOSE BLD STRIP.AUTO-MCNC: 113 MG/DL (ref 65–117)
GLUCOSE BLD STRIP.AUTO-MCNC: 121 MG/DL (ref 65–117)
HBV CORE AB SERPL QL IA: NEGATIVE
SERVICE CMNT-IMP: ABNORMAL
SERVICE CMNT-IMP: NORMAL

## 2024-03-19 PROCEDURE — 6370000000 HC RX 637 (ALT 250 FOR IP): Performed by: HOSPITALIST

## 2024-03-19 PROCEDURE — 82962 GLUCOSE BLOOD TEST: CPT

## 2024-03-19 PROCEDURE — 99233 SBSQ HOSP IP/OBS HIGH 50: CPT | Performed by: INTERNAL MEDICINE

## 2024-03-19 PROCEDURE — 6370000000 HC RX 637 (ALT 250 FOR IP): Performed by: INTERNAL MEDICINE

## 2024-03-19 RX ORDER — GUAIFENESIN 600 MG/1
600 TABLET, EXTENDED RELEASE ORAL 2 TIMES DAILY
Qty: 20 TABLET | Refills: 0 | Status: SHIPPED
Start: 2024-03-19 | End: 2024-03-29

## 2024-03-19 RX ORDER — MIRTAZAPINE 15 MG/1
15 TABLET, FILM COATED ORAL NIGHTLY
Qty: 30 TABLET | Refills: 3 | Status: SHIPPED
Start: 2024-03-19 | End: 2024-03-19

## 2024-03-19 RX ORDER — HYDROXYZINE HYDROCHLORIDE 25 MG/1
25 TABLET, FILM COATED ORAL 3 TIMES DAILY PRN
Qty: 30 TABLET | Refills: 0 | Status: SHIPPED
Start: 2024-03-19 | End: 2024-03-29

## 2024-03-19 RX ORDER — MIRTAZAPINE 15 MG/1
15 TABLET, FILM COATED ORAL NIGHTLY
Qty: 30 TABLET | Refills: 3 | Status: SHIPPED | OUTPATIENT
Start: 2024-03-19

## 2024-03-19 RX ADMIN — HYDRALAZINE HYDROCHLORIDE 25 MG: 25 TABLET ORAL at 09:15

## 2024-03-19 RX ADMIN — FERROUS SULFATE TAB 325 MG (65 MG ELEMENTAL FE) 325 MG: 325 (65 FE) TAB at 07:23

## 2024-03-19 RX ADMIN — SEVELAMER CARBONATE 800 MG: 800 TABLET, FILM COATED ORAL at 09:17

## 2024-03-19 RX ADMIN — SEVELAMER CARBONATE 800 MG: 800 TABLET, FILM COATED ORAL at 11:13

## 2024-03-19 RX ADMIN — Medication 2000 UNITS: at 09:15

## 2024-03-19 RX ADMIN — ISOSORBIDE DINITRATE 10 MG: 5 TABLET ORAL at 13:26

## 2024-03-19 RX ADMIN — SACUBITRIL AND VALSARTAN 1 TABLET: 49; 51 TABLET, FILM COATED ORAL at 09:15

## 2024-03-19 RX ADMIN — AMLODIPINE BESYLATE 10 MG: 5 TABLET ORAL at 09:15

## 2024-03-19 RX ADMIN — ISOSORBIDE DINITRATE 10 MG: 5 TABLET ORAL at 09:15

## 2024-03-19 RX ADMIN — ACETAMINOPHEN 1000 MG: 500 TABLET ORAL at 13:26

## 2024-03-19 RX ADMIN — PANTOPRAZOLE SODIUM 40 MG: 40 TABLET, DELAYED RELEASE ORAL at 07:23

## 2024-03-19 RX ADMIN — CARVEDILOL 25 MG: 12.5 TABLET, FILM COATED ORAL at 07:23

## 2024-03-19 RX ADMIN — ACETAMINOPHEN 1000 MG: 500 TABLET ORAL at 07:23

## 2024-03-19 RX ADMIN — HYDRALAZINE HYDROCHLORIDE 25 MG: 25 TABLET ORAL at 13:26

## 2024-03-19 ASSESSMENT — PAIN SCALES - GENERAL
PAINLEVEL_OUTOF10: 5
PAINLEVEL_OUTOF10: 0

## 2024-03-19 ASSESSMENT — PAIN DESCRIPTION - ORIENTATION: ORIENTATION: LEFT

## 2024-03-19 ASSESSMENT — PAIN DESCRIPTION - DESCRIPTORS: DESCRIPTORS: ACHING

## 2024-03-19 ASSESSMENT — PAIN DESCRIPTION - LOCATION: LOCATION: HIP

## 2024-03-19 NOTE — PLAN OF CARE
Problem: Discharge Planning  Goal: Discharge to home or other facility with appropriate resources  3/17/2024 2002 by Nancy Patton RN  Outcome: Progressing  3/17/2024 1828 by Bing Gilmore RN  Outcome: Progressing     Problem: Pain  Goal: Verbalizes/displays adequate comfort level or baseline comfort level  3/17/2024 2002 by Nancy Patton RN  Outcome: Progressing  3/17/2024 1828 by Bing Gilmore RN  Outcome: Progressing     Problem: Skin/Tissue Integrity  Goal: Absence of new skin breakdown  Description: 1.  Monitor for areas of redness and/or skin breakdown  2.  Assess vascular access sites hourly  3.  Every 4-6 hours minimum:  Change oxygen saturation probe site  4.  Every 4-6 hours:  If on nasal continuous positive airway pressure, respiratory therapy assess nares and determine need for appliance change or resting period.  3/17/2024 2002 by Nancy Patton RN  Outcome: Progressing  3/17/2024 1828 by Bing Gilmore RN  Outcome: Progressing     Problem: Safety - Adult  Goal: Free from fall injury  3/17/2024 2002 by Nancy Patton RN  Outcome: Progressing  3/17/2024 1828 by Bing Gilmore RN  Outcome: Progressing     Problem: Chronic Conditions and Co-morbidities  Goal: Patient's chronic conditions and co-morbidity symptoms are monitored and maintained or improved  3/17/2024 2002 by Nancy Patton RN  Outcome: Progressing  3/17/2024 1828 by Bing Gilmore RN  Outcome: Progressing     
  Problem: Discharge Planning  Goal: Discharge to home or other facility with appropriate resources  Outcome: Progressing     Problem: Pain  Goal: Verbalizes/displays adequate comfort level or baseline comfort level  Outcome: Progressing     Problem: Skin/Tissue Integrity  Goal: Absence of new skin breakdown  Description: 1.  Monitor for areas of redness and/or skin breakdown  2.  Assess vascular access sites hourly  3.  Every 4-6 hours minimum:  Change oxygen saturation probe site  4.  Every 4-6 hours:  If on nasal continuous positive airway pressure, respiratory therapy assess nares and determine need for appliance change or resting period.  Outcome: Progressing     Problem: Safety - Adult  Goal: Free from fall injury  Outcome: Progressing     
  Problem: Discharge Planning  Goal: Discharge to home or other facility with appropriate resources  Outcome: Progressing     Problem: Pain  Goal: Verbalizes/displays adequate comfort level or baseline comfort level  Outcome: Progressing     Problem: Skin/Tissue Integrity  Goal: Absence of new skin breakdown  Description: 1.  Monitor for areas of redness and/or skin breakdown  2.  Assess vascular access sites hourly  3.  Every 4-6 hours minimum:  Change oxygen saturation probe site  4.  Every 4-6 hours:  If on nasal continuous positive airway pressure, respiratory therapy assess nares and determine need for appliance change or resting period.  Outcome: Progressing     Problem: Safety - Adult  Goal: Free from fall injury  Outcome: Progressing     Problem: Chronic Conditions and Co-morbidities  Goal: Patient's chronic conditions and co-morbidity symptoms are monitored and maintained or improved  Outcome: Progressing     
  Problem: Discharge Planning  Goal: Discharge to home or other facility with appropriate resources  Outcome: Progressing     Problem: Pain  Goal: Verbalizes/displays adequate comfort level or baseline comfort level  Outcome: Progressing     Problem: Skin/Tissue Integrity  Goal: Absence of new skin breakdown  Description: 1.  Monitor for areas of redness and/or skin breakdown  2.  Assess vascular access sites hourly  3.  Every 4-6 hours minimum:  Change oxygen saturation probe site  4.  Every 4-6 hours:  If on nasal continuous positive airway pressure, respiratory therapy assess nares and determine need for appliance change or resting period.  Outcome: Progressing     Problem: Safety - Adult  Goal: Free from fall injury  Outcome: Progressing     Problem: Chronic Conditions and Co-morbidities  Goal: Patient's chronic conditions and co-morbidity symptoms are monitored and maintained or improved  Outcome: Progressing     
  Problem: Discharge Planning  Goal: Discharge to home or other facility with appropriate resources  Outcome: Progressing     Problem: Pain  Goal: Verbalizes/displays adequate comfort level or baseline comfort level  Outcome: Progressing     Problem: Skin/Tissue Integrity  Goal: Absence of new skin breakdown  Description: 1.  Monitor for areas of redness and/or skin breakdown  2.  Assess vascular access sites hourly  3.  Every 4-6 hours minimum:  Change oxygen saturation probe site  4.  Every 4-6 hours:  If on nasal continuous positive airway pressure, respiratory therapy assess nares and determine need for appliance change or resting period.  Outcome: Progressing     Problem: Safety - Adult  Goal: Free from fall injury  Outcome: Progressing     Problem: Chronic Conditions and Co-morbidities  Goal: Patient's chronic conditions and co-morbidity symptoms are monitored and maintained or improved  Outcome: Progressing     Problem: Physical Therapy - Adult  Goal: By Discharge: Performs mobility at highest level of function for planned discharge setting.  See evaluation for individualized goals.  Description: FUNCTIONAL STATUS PRIOR TO ADMISSION: Patient very groggy during evaluation, unsure of reliability of history.  Patient reports using a SPC for mobility.    HOME SUPPORT PRIOR TO ADMISSION: Patient reports living with her daughter and son-in-law, who she reports assists her with bathing and dressing.    Physical Therapy Goals  Initiated 3/15/2024  1.  Patient will move from supine to sit and sit to supine, scoot up and down, and roll side to side in bed with contact guard assist within 7 day(s).    2.  Patient will perform sit to stand with minimal assistance within 7 day(s).  3.  Patient will transfer from bed to chair and chair to bed with minimal assistance using the least restrictive device within 7 day(s).  4.  Patient will ambulate with minimal assistance for 50 feet with the least restrictive device within 7 
  Problem: Physical Therapy - Adult  Goal: By Discharge: Performs mobility at highest level of function for planned discharge setting.  See evaluation for individualized goals.  Description: FUNCTIONAL STATUS PRIOR TO ADMISSION: Patient very groggy during evaluation, unsure of reliability of history.  Patient reports using a SPC for mobility.    HOME SUPPORT PRIOR TO ADMISSION: Patient reports living with her daughter and son-in-law, who she reports assists her with bathing and dressing.    Physical Therapy Goals  Initiated 3/15/2024  1.  Patient will move from supine to sit and sit to supine, scoot up and down, and roll side to side in bed with contact guard assist within 7 day(s).    2.  Patient will perform sit to stand with minimal assistance within 7 day(s).  3.  Patient will transfer from bed to chair and chair to bed with minimal assistance using the least restrictive device within 7 day(s).  4.  Patient will ambulate with minimal assistance for 50 feet with the least restrictive device within 7 day(s).   5.  Patient will sit up in chair 2 hours at a time to improve OOB tolerance within 7 day(s).    Outcome: Progressing   PHYSICAL THERAPY TREATMENT    Patient: Sherly Chakraborty (69 y.o. female)  Date: 3/18/2024  Diagnosis: Inability to walk [R26.2]  Stress fracture of right pubic ramus [M84.350A] Stress fracture of right pubic ramus      Precautions:  (falls, bilateral legally blind (Pt reports, \"cannot see anything\"), R pubic ramus fx, RLE WBAT) Right Lower Extremity Weight Bearing: Weight Bearing As Tolerated                    ASSESSMENT:  Pt tolerated PT services well and continues to progress toward PT POC goals. Progress to date less than anticipated per underlying medical status and pain impacting safe activity tolerance and carryover. Pt was received in bed with noted ~lethargy/grogginess, but easily able to redirect and reorient. Pt confirms no home O2 and therapist observed Pt wearing NC ~as a 
cues;Demonstration;Safety awareness training;Tactile cues;Verbal cues  Sit to Stand: Moderate assistance;Maximum assistance  Stand to Sit: Maximum assistance;Moderate assistance  Stand Pivot Transfers: Maximum assistance  Bed to Chair: Maximum assistance (via SPT)           Balance:  Standing: Impaired  Balance  Sitting: Impaired  Sitting - Static:  (fair plus/fair)  Sitting - Dynamic:  (fair/fair minus)  Standing: Impaired  Standing - Static: Constant support (fair/fair plus)  Standing - Dynamic: Constant support (poor plus/fair minus)      ADL Intervention:  Pt. educated and instructed on energy conservation and work simplification techniques to increase safety and independence with ADLs and functional mobility.  Pt. demonstrated fair understanding    Therapeutic Exercise:  Pt participated in BUE AROM exer to improve her strength and endurance for all functional tasks.  Pt performed 2 sets 5 reps shoulder flexion, horizontal abduction and chest presses.  Pt fatigues quickly.     Pain Ratin/10     Activity Tolerance:   Fair  and requires frequent rest breaks  Please refer to the flowsheet for vital signs taken during this treatment.    After treatment:   Patient left in no apparent distress in bed, Call bell within reach, and HD RN present    COMMUNICATION/EDUCATION:   The patient's plan of care was discussed with: physical therapist, registered nurse, and rehabilitation technician         Thank you for this referral.  Jeanine Juarez OT  Minutes: 10   
is needed moving to and from a bed to a chair?: Total  How much help is needed standing up from a chair using your arms?: Total  How much help is needed walking in hospital room?: Total  How much help is needed climbing 3-5 steps with a railing?: Total    -PAC Inpatient Mobility Raw Score : 8  -PAC Inpatient T-Scale Score : 28.52     Cutoff score ?171,2,3 had higher odds of discharging home with home health or need of SNF/IPR.    1. Renetta Garcia, Sherly Chang, Deven Angel, Veronique Brandon, Anthony Bond, Papi Garcia.  Validity of the AM-PAC “6-Clicks” Inpatient Daily Activity and Basic Mobility Short Forms. Physical Therapy Mar 2014, 94 3) 379-391; DOI: 10.2522/ptj.[id number]  2. Moshe CERNA, Mishel SPAIN, Lola SPAIN, Gabriel J. Association of AM-PAC \"6-Clicks\" Basic Mobility and Daily Activity Scores With Discharge Destination. Phys Ther. 2021 Apr 4;101(4):ihjw645. doi: 10.1093/ptj/ruqe495. PMID: 29039647.  3. Marcos J, Nirav D, Freda S, Tee K, Nehemiah S. Activity Measure for Post-Acute Care \"6-Clicks\" Basic Mobility Scores Predict Discharge Destination After Acute Care Hospitalization in Select Patient Groups: A Retrospective, Observational Study. Arch Rehabil Res Clin Transl. 2022 Jul 16;4(3):014097. doi: 10.1016/j.arrct.2022.828939. PMID: 24095349; PMCID: [id number].  4. Jose MCDANIEL, Tawnya S, Amalia W, Ciera P. AM-PAC Short Forms Manual 4.0. Revised 2/2020.                                                                                                                                                                                                                               Pain Rating:  10/10 at times with attempts to weight bear, R groin  Pain Intervention(s):   nursing notified and addressing    Activity Tolerance:   Fair     After treatment:   Patient left in no apparent distress in bed, Call bell within reach, and Bed/ chair alarm activated    COMMUNICATION/EDUCATION:   The patient's 
of Tool:  Represents clinically-significant functional categories (i.e. Activities of daily living).    Cutoff score 39.4 (19) correlates to a good likelihood of discharging home versus a facility  Renetta Garcia, Sherly Chang, Deven Angel, Veronique Brandon, Anthony Bond, Papi Garcia, -PAC “6-Clicks” Functional Assessment Scores Predict Acute Care Hospital Discharge Destination, Physical Therapy, Volume 94, Issue 9, 1 September 2014, Pages 2700-7550, [email or web address]    Pain Rating:  10/10   Pain Intervention(s):   rest and repositioning    Activity Tolerance:   Poor and limited by pain     After treatment:   Patient left in no apparent distress in bed, Call bell within reach, Side rails x3, and Patient offloaded in partial left side lying for pressure relief    COMMUNICATION/EDUCATION:   The patient's plan of care was discussed with: physical therapist and registered nurse    Patient Education  Education Given To: Patient  Education Provided: Role of Therapy  Education Method: Verbal  Barriers to Learning: Cognition (pain)  Education Outcome: Unable to demonstrate understanding    Thank you for this referral.  Suzette Mcleod OT  Minutes: 22    Occupational Therapy Evaluation Charge Determination   History Examination Decision-Making   LOW Complexity : Brief history review  LOW Complexity: 1-3 Performance deficits relating to physical, cognitive, or psychosocial skills that result in activity limitations and/or participation restrictions LOW Complexity: No comorbidities that affect functional and  no verbal  or physical assist needed to complete eval tasks   Based on the above components, the patient evaluation is determined to be of the following complexity level: Low

## 2024-03-19 NOTE — DISCHARGE SUMMARY
Discharge Summary       PATIENT ID: Sherly Chakraborty  MRN: [id number]   YOB: 1955    DATE OF ADMISSION: 3/14/2024 12:31 PM    DATE OF DISCHARGE: 3/19/2024   PRIMARY CARE PROVIDER: Derick Paula MD     ATTENDING PHYSICIAN:  Rodolfo Gramajo MD  DISCHARGING PROVIDER: Rodolfo Gramajo MD    To contact this individual call [phone] and ask the  to page.  If unavailable ask to be transferred the Adult Hospitalist Department.    CONSULTATIONS:   IP CONSULT TO NEPHROLOGY  IP CONSULT TO ORTHOPEDIC SURGERY  IP CONSULT TO PALLIATIVE CARE  IP CONSULT TO CASE MANAGEMENT    PROCEDURES/SURGERIES: * No surgery found *    ADMITTING DIAGNOSES & HOSPITAL COURSE:     Sherly Chakraborty is a 69 y.o. female with PMH significant for ESRD on HD on MWF, legally blind both eyes, chronic HFrEF last EF 20-25% in 2022, ischemic cardiomyopathy s/p ICD, CAD s/p CABG, HTN, T2DM, anemia of chronic disease and poor historian who is brought from home after she was unable to get up and ambulate following a fall earlier this morning at 1:30 am.  She stated that she fell after using bedside, but no head injury or loss of consciousness.  No seizure activities, left-sided chest pain, palpitation or dizziness.  She stated that she has right groin area pain more when she moves her right legs legs.  Per her daughter, she stated that patient ambulated few steps with a cane and uses most of the time with care for the last  one month.     The patient denies any headache, sore throat, trouble swallowing, trouble with speech, chest pain, SOB, cough, fever, chills, N/V/D, abd pain, urinary symptoms, sick contacts with COVID-19 diagnosed patients, hematemesis, melena or hemoptysis.     Vital signs on arrival to ER; temperature 97.6, respirate 15, pulse 99, /82, saturation of oxygen 97% on room air.  CT pelvis showed acute comminuted nondisplaced right inferior pubic ramus fracture.  Chest x-ray was ordered  Hospitalist

## 2024-03-19 NOTE — PROGRESS NOTES
[facility]                                                                                          Hospitalist Progress Note  Rodolfo Gramajo MD  Answering service: [phone] OR [phone] from in house phone        Date of Service:  3/15/2024  NAME:  Sherly Chakraborty  :  1955  MRN:  [id number]       Admission Summary:     Sherly Chakraborty is a 69 y.o. female with PMH significant for ESRD on HD on MWF, legally blind both eyes, chronic HFrEF last EF 20-25% in , ischemic cardiomyopathy s/p ICD, CAD s/p CABG, HTN, T2DM, anemia of chronic disease and poor historian who is brought from home after she was unable to get up and ambulate following a fall earlier this morning at 1:30 am.  She stated that she fell after using bedside, but no head injury or loss of consciousness.  No seizure activities, left-sided chest pain, palpitation or dizziness.  She stated that she has right groin area pain more when she moves her right legs legs.  Per her daughter, she stated that patient ambulated few steps with a cane and uses most of the time with care for the last  one month.     The patient denies any headache, sore throat, trouble swallowing, trouble with speech, chest pain, SOB, cough, fever, chills, N/V/D, abd pain, urinary symptoms, sick contacts with COVID-19 diagnosed patients, hematemesis, melena or hemoptysis.     Vital signs on arrival to ER; temperature 97.6, respirate 15, pulse 99, /82, saturation of oxygen 97% on room air.  CT pelvis showed acute comminuted nondisplaced right inferior pubic ramus fracture.  Chest x-ray was ordered  Hospitalist service is consulted for further evaluation and admission.      Interval history / Subjective:     Patient seen and examined at the bedside.  She stated that she feels better. She said her right hip pain improving with pain medication. No chest pain or difficulty of breathing     Assessment & Plan:     Acute comminuted 
        [facility]                                                                                          Hospitalist Progress Note  Rodolfo Gramajo MD  Answering service: [phone] OR [phone] from in house phone        Date of Service:  3/17/2024  NAME:  Sherly Chakraborty  :  1955  MRN:  [id number]       Admission Summary:     Sherly Chakraborty is a 69 y.o. female with PMH significant for ESRD on HD on MWF, legally blind both eyes, chronic HFrEF last EF 20-25% in , ischemic cardiomyopathy s/p ICD, CAD s/p CABG, HTN, T2DM, anemia of chronic disease and poor historian who is brought from home after she was unable to get up and ambulate following a fall earlier this morning at 1:30 am.  She stated that she fell after using bedside, but no head injury or loss of consciousness.  No seizure activities, left-sided chest pain, palpitation or dizziness.  She stated that she has right groin area pain more when she moves her right legs legs.  Per her daughter, she stated that patient ambulated few steps with a cane and uses most of the time with care for the last  one month.     The patient denies any headache, sore throat, trouble swallowing, trouble with speech, chest pain, SOB, cough, fever, chills, N/V/D, abd pain, urinary symptoms, sick contacts with COVID-19 diagnosed patients, hematemesis, melena or hemoptysis.     Vital signs on arrival to ER; temperature 97.6, respirate 15, pulse 99, /82, saturation of oxygen 97% on room air.  CT pelvis showed acute comminuted nondisplaced right inferior pubic ramus fracture.  Chest x-ray was ordered  Hospitalist service is consulted for further evaluation and admission.      Interval history / Subjective:     Patient seen and examined at the bedside.  She stated that she feels better. She said her right hip pain improved with pain medication. No chest pain or difficulty of breathing    I called and updated her daughter, Dontae, 
    [address], [facility] [place] [address]  Phone: [phone]   [phone]    [email or web address]     Nephrology Progress Note    Patient Name : Sherly Chakraborty      : 1955     MRN : [id number]  Date of Admission : 3/14/2024  Date of Servive : 24    CC:  Follow up for ESRD       Assessment and Plan   ESRD on HD MWF:  - pt of [facility]  - access: RUE AVF  - HD today per routine    AOCD  - Hb stable, not on GERMAINE    CKD-MBD  - renal diet     Fall/ acute R inferior pubic ramus fx  - mgt per ortho    HTN  HFrEF   CAD/CABG  DM2  Legal blind     Interval History:  Seen and examined.  Resting in bed eating breakfast.  No cp, sob.  For HD later today.    Review of Systems: Pertinent items are noted in HPI.    Current Medications:   Current Facility-Administered Medications   Medication Dose Route Frequency    oxyCODONE-acetaminophen (PERCOCET) 5-325 MG per tablet 1 tablet  1 tablet Oral Q8H PRN    rosuvastatin (CRESTOR) tablet 10 mg  10 mg Oral Nightly    hydrOXYzine HCl (ATARAX) tablet 25 mg  25 mg Oral TID PRN    ondansetron (ZOFRAN) injection 4 mg  4 mg IntraVENous Q6H PRN    acetaminophen (TYLENOL) tablet 650 mg  650 mg Oral Q6H PRN    amLODIPine (NORVASC) tablet 10 mg  10 mg Oral Daily    carvedilol (COREG) tablet 25 mg  25 mg Oral BID WC    docusate sodium (COLACE) capsule 100 mg  100 mg Oral PRN    ferrous sulfate (IRON 325) tablet 325 mg  325 mg Oral Daily with breakfast    hydrALAZINE (APRESOLINE) tablet 25 mg  25 mg Oral TID    isosorbide dinitrate (ISORDIL) tablet 10 mg  10 mg Oral TID    pantoprazole (PROTONIX) tablet 40 mg  40 mg Oral QAM AC    sacubitril-valsartan (ENTRESTO) 49-51 MG per tablet 1 tablet  1 tablet Oral BID    sevelamer (RENVELA) tablet 800 mg  800 mg Oral TID WC    Vitamin D (CHOLECALCIFEROL) tablet 2,000 Units  2,000 Units Oral Daily    insulin lispro (HUMALOG) injection vial 0-8 Units  0-8 Units SubCUTAneous TID WC 
    [address], [facility] [place] [address]  Phone: [phone]   [phone]    [email or web address]     Nephrology Progress Note    Patient Name : Sherly Chakraborty      : 1955     MRN : [id number]  Date of Admission : 3/14/2024  Date of Servive : 24    CC:  Follow up for ESRD       Assessment and Plan   ESRD on HD MWF:  - pt of [facility]  - access: RUE AVF  - Tolerated HD well yesterday  - stable from renal stand point for discharge    AOCD  - Hb stable, not on GERMAINE    CKD-MBD  - renal diet     Fall/ acute R inferior pubic ramus fx  - mgt per ortho    HTN  HFrEF   CAD/CABG  DM2  Legal blind     Interval History:  Seen and examined.  Daughter present a bedside. Patient denies issues with HD yesterday. NAD. BP stable. Plans to discharge to [facility]    Review of Systems: Pertinent items are noted in HPI.    Current Medications:   Current Facility-Administered Medications   Medication Dose Route Frequency    mirtazapine (REMERON) tablet 15 mg  15 mg Oral Nightly    acetaminophen (TYLENOL) tablet 1,000 mg  1,000 mg Oral 3 times per day    oxyCODONE (ROXICODONE) immediate release tablet 5 mg  5 mg Oral Q4H PRN    rosuvastatin (CRESTOR) tablet 10 mg  10 mg Oral Nightly    hydrOXYzine HCl (ATARAX) tablet 25 mg  25 mg Oral TID PRN    ondansetron (ZOFRAN) injection 4 mg  4 mg IntraVENous Q6H PRN    acetaminophen (TYLENOL) tablet 650 mg  650 mg Oral Q6H PRN    amLODIPine (NORVASC) tablet 10 mg  10 mg Oral Daily    carvedilol (COREG) tablet 25 mg  25 mg Oral BID WC    docusate sodium (COLACE) capsule 100 mg  100 mg Oral PRN    ferrous sulfate (IRON 325) tablet 325 mg  325 mg Oral Daily with breakfast    hydrALAZINE (APRESOLINE) tablet 25 mg  25 mg Oral TID    isosorbide dinitrate (ISORDIL) tablet 10 mg  10 mg Oral TID    pantoprazole (PROTONIX) tablet 40 mg  40 mg Oral QAM AC    sacubitril-valsartan (ENTRESTO) 49-51 MG per tablet 1 tablet  1 tablet Oral 
  Physician Progress Note      PATIENT:               RICHARDSON SINGH  CSN #:                  [id number]  :                       1955  ADMIT DATE:       3/14/2024 12:31 PM  DISCH DATE:  RESPONDING  PROVIDER #:        Rodolfo Gramajo MD          QUERY TEXT:    Pt admitted with Acute comminuted nondisplaced right inferior pubic ramus   following GLF. Pt noted to have severe osteopenia in XR of hip on . If   possible, please document in progress notes and discharge summary if you are   evaluating and/or treating any of the following:    The medical record reflects the following:  Risk Factors: fall, 69 yr old female  Clinical Indicators: H&P note noted as Acute comminuted nondisplaced right   inferior pubic ramus, XR HIP noted Bones as severely osteopenic. Pt takes   Vitamin D at home  Treatment: -Vitamin D continued, prn pain meds; Tylenol, Ultram, Ortho consult   made.    Thank you  Sindhu Coffey RN  Clinical Documentation  [phone], or via [employer]  Options provided:  -- Pathological Acute comminuted nondisplaced right inferior pubic ramus   fracture due to osteopenia following fall which would not usually break a   normal, healthy bone  -- Traumatic Acute comminuted nondisplaced right inferior pubic ramus fracture  -- Other - I will add my own diagnosis  -- Disagree - Not applicable / Not valid  -- Disagree - Clinically unable to determine / Unknown  -- Refer to Clinical Documentation Reviewer    PROVIDER RESPONSE TEXT:    This patient has a traumatic Acute comminuted nondisplaced right inferior   pubic ramusfracture.    Query created by: Sindhu Coffey on 3/15/2024 11:37 AM      Electronically signed by:  Rodolfo Gramajo MD 3/15/2024 3:19 PM          
Care Management - Received request from MD for SNF for patient. Reviewed chart. Met with patient in room. She prefers home, but gave approval for CM to call daughter.     10:38 Called daughter Laness Young ([phone]). She prefers SNF for patient. At this time, [facility] [place] is her first choice. She will be at hospital in about 2 hours and will call CM with additional choices.     Sent message to hospitalist RN regarding discharge plan for SNF.    GEOVANI BUI    Addendum: Daughter called back. Choices are as follows 1-[facility], 2-[facility] in [place], 3-[facility]. Will send those referrals now.     GEOVANI BUI          
Palliative Medicine  Patient Name: Sherly Chakraborty  YOB: 1955  MRN: [id number]  Age: 69 y.o.  Gender: female    Date of Initial Consult: 3/18/24  Date of Service: 3/19/2024  Time: 1:17 PM  Provider: Gladys Del Valle MD  Hospital Day: 6  Admit Date: 3/14/2024  Referring Provider: Rodolfo Gramajo MD       Reasons for Consultation:  Goals of Care    HISTORY OF PRESENT ILLNESS (HPI):   Sherly Chakraborty is a 69 y.o. female with a past medical history of ESRD on HD, HfrEF (20%), CAD, HTN, DM, who was admitted on 3/14/2024 from home with a diagnosis of falls.     Upon arrival to the ER she as found to have a fracture of the inferior pubic ramus. She was admitted and evaluated by ortho -- no surgical intervention required.     Psychosocial: pt and  lives with her daughter Lanessa. She has two children Lanessa and Jorge L.       PALLIATIVE DIAGNOSES:    Palliative care encounter  Debility  ESRD  Pelvic pain  Poor sleep  Anxiety/depression    ASSESSMENT AND PLAN:   Chart reviewed including labs, notes, imaging. Met pt at bedside, she has limited ability to participate in MDM  Discussed with [facility] hospice liaison   Daughter shares over the past 2-3 months pt has not been eating and has been more withdrawn.  Family is interested in exploring hospice, however pt does not want to stop HD. We discussed that on rare occasions hospice might be agreeable to continue HD if the primary diagnosis was not related to ESRD. This would be unusual and would be very agency dependent  Hospice consult placed for information session  Poor sleep/mood/appetite  Continue  remeron 15mg qhs, this can be increased in 1-2 weeks  Pelvic pain  2/2 right inferior pubic ramus fx  Schedule tylenol  Stop tramadol in favor of oxycodone  Advance care planning  AMD completed. Pt names Lanessa as primary agent and Jeri as secondary agent  Please call with any palliative questions or concerns.  Palliative Care Team is available via 
Patient transfer report given to nurse Den at [facility]. SBAR report given along with moment for questions provided. Patient transferred by stretcher out of facility with all belongings.   
Pt's O2 drops to 81% on room air, so put the pt in 2 l and came back to 95%, informed Dr Gramajo about that.  
Spiritual Care Assessment/Progress Note  [facility]    Name: Sherly Chakraborty MRN: [id number]    Age: 69 y.o.     Sex: female   Language: English     Date: 3/19/2024            Total Time Calculated: 15 min              Spiritual Assessment begun in [facility] 5S ORTHO JOINT  Service Provided For:: Patient not available  Referral/Consult From:: Palliative Care  Encounter Overview/Reason : Palliative Care    Spiritual beliefs:      [] Involved in a ranulfo tradition/spiritual practice:      [] Supported by a ranulfo community:      [] Claims no spiritual orientation:      [] Seeking spiritual identity:           [] Adheres to an individual form of spirituality:      [x] Not able to assess:                Identified resources for coping and support system:   Support System: Children, Family members, Palliative Care       [] Prayer                  [] Devotional reading               [] Music                  [] Guided Imagery     [] Pet visits                                        [] Other: (COMMENT)     Specific area/focus of visit   Encounter:    Crisis:    Spiritual/Emotional needs:    Ritual, Rites and Sacraments:    Grief, Loss, and Adjustments:    Ethics/Mediation:    Behavioral Health:    Palliative Care: Type: Palliative Care, Initial/Spiritual Assessment  Advance Care Planning:      I attempted to visit Sherly Chakraborty for a palliative initial spiritual assessment. Unable to visit patient at this time. She was sleeping and did not awake. No family present. Patient's chart was consulted.  Chaplain Dina, Damon, MS, BCC  
told her that she is ready for discharge to SNF on 3/18     Assessment & Plan:     Acute comminuted nondisplaced right inferior pubic ramus fracture secondary to fall  -continue tylenol, Ultram and percocet  -fall precaution  -PT/OT  -seen by orthopedic surgeon, no surgical intervention at this time     ESRD on HD on MWF  -last HD was on 3/13, possible HD today 3/15  -Continue home vitamin D and Renvela  -nephrologist on board     Chronic HFrEF EF 20-25% s/p ICD  -Compensated  -Continue home Entresto, carvedilol, hydralazine, and isosorbide dinitrate,  -Hold home Lasix 20 mg for now  -Monitor I's/O and daily weight  -Continue cardiac monitoring     Hx of CAD s/p CABG  -Stable  -No left-sided chest pain  -Continue Crestor, isosorbide dinitrate, and carvedilol  -Continue cardiac monitoring     HTN  -BP normal, resume home amlodipine, hydralazine, carvedilol, Entresto and monitor BP     T2DM  -A1c 7.3  -continue insulin sliding scale and monitor finger stick glucose      Anemia of chronic disease  -Hgb 11.4  -continue ferrous sulfate  -monitor H/H      Legally blind both eyes  -continue supportive care     Debility  -consulted to PT/OT once evaluated by orthopedic surgical service         Full Code: daughter requesting to talk to palliative care team     Code status: Full Code  Prophylaxis: SCD  Care Plan discussed with: Patient, Nurse and CM  Anticipated Disposition: Medically stable for SNF  Inpatient  Cardiac monitoring: Remote Telemetry     Principal Problem:    Stress fracture of right pubic ramus  Active Problems:    Inferior pubic ramus fracture, right, closed, initial encounter ([facility])  Resolved Problems:    * No resolved hospital problems. *         Social Determinants of Health     Tobacco Use: Low Risk  (10/25/2023)    Patient History     Smoking Tobacco Use: Never     Smokeless Tobacco Use: Never     Passive Exposure: Not on file   Alcohol Use: Not At Risk (1/26/2024)    AUDIT-C     Frequency of Alcohol 
ferrous sulfate (IRON 325) tablet 325 mg  325 mg Oral Daily with breakfast    hydrALAZINE (APRESOLINE) tablet 25 mg  25 mg Oral TID    isosorbide dinitrate (ISORDIL) tablet 10 mg  10 mg Oral TID    pantoprazole (PROTONIX) tablet 40 mg  40 mg Oral QAM AC    sacubitril-valsartan (ENTRESTO) 49-51 MG per tablet 1 tablet  1 tablet Oral BID    sevelamer (RENVELA) tablet 800 mg  800 mg Oral TID WC    Vitamin D (CHOLECALCIFEROL) tablet 2,000 Units  2,000 Units Oral Daily    insulin lispro (HUMALOG) injection vial 0-8 Units  0-8 Units SubCUTAneous TID WC    insulin lispro (HUMALOG) injection vial 0-4 Units  0-4 Units SubCUTAneous Nightly    sodium chloride flush 0.9 % injection 5-40 mL  5-40 mL IntraVENous 2 times per day    sodium chloride flush 0.9 % injection 5-40 mL  5-40 mL IntraVENous PRN    0.9 % sodium chloride infusion   IntraVENous PRN    potassium chloride (KLOR-CON) extended release tablet 40 mEq  40 mEq Oral PRN    Or    potassium bicarb-citric acid (EFFER-K) effervescent tablet 40 mEq  40 mEq Oral PRN    Or    potassium chloride 10 mEq/100 mL IVPB (Peripheral Line)  10 mEq IntraVENous PRN    magnesium sulfate 2000 mg in 50 mL IVPB premix  2,000 mg IntraVENous PRN    polyethylene glycol (GLYCOLAX) packet 17 g  17 g Oral Daily PRN    ondansetron (ZOFRAN-ODT) disintegrating tablet 4 mg  4 mg Oral Q8H PRN    Or    ondansetron (ZOFRAN) injection 4 mg  4 mg IntraVENous Q6H PRN    traMADol (ULTRAM) tablet 50 mg  50 mg Oral Q6H PRN    Or    traMADol (ULTRAM) tablet 100 mg  100 mg Oral Q6H PRN    glucose chewable tablet 16 g  4 tablet Oral PRN    dextrose bolus 10% 125 mL  125 mL IntraVENous PRN    Or    dextrose bolus 10% 250 mL  250 mL IntraVENous PRN    glucagon injection 1 mg  1 mg SubCUTAneous PRN    dextrose 10 % infusion   IntraVENous Continuous PRN     ______________________________________________________________________  EXPECTED LENGTH OF STAY: Unable to retrieve estimated LOS  ACTUAL LENGTH OF STAY:

## 2024-03-19 NOTE — CARE COORDINATION
Transition of Care Plan:     4:00pm-CM spoke to Sherly in admissions with [facility]. They will have a bed available after 1:30pm today.  Stretcher transportation scheduled for 330pm with [facility].  CM updated patient's daughter, Lanessa Young.    RN call report to [phone]  RUR: 13%  Prior Level of Functioning: Assistance with some ADLs.  Disposition: SNF with HD  OP HD at [facility]  If SNF or IPR: Date FOC offered:   Date FOC received:   Accepting facility: Pending-[facility] denied, [facility] accepted and [facility] pending.  JOSÉ called admissions at [facility].  Admissions director currently reviewing.  Date authorization started with reference number:   Date authorization received and expires:   Follow up appointments: PCP/Specialist  DME needed: No  Transportation at discharge: Stretcher vs. WC van  IM/IMM Medicare/ letter given: 3/16  Is patient a  and connected with [facility]?               If yes, was [place] transfer form completed and [facility] notified?   Caregiver Contact: Lanessa Young  Discharge Caregiver contacted prior to discharge? Yes  Care Conference needed? No  Barriers to discharge:  Placement     Priyanka Kohli RN/CRM  [phone]

## 2024-07-14 ENCOUNTER — APPOINTMENT (OUTPATIENT)
Facility: HOSPITAL | Age: 69
End: 2024-07-14
Payer: MEDICARE

## 2024-07-14 ENCOUNTER — HOSPITAL ENCOUNTER (EMERGENCY)
Facility: HOSPITAL | Age: 69
Discharge: HOME | End: 2024-07-16
Attending: EMERGENCY MEDICINE | Admitting: EMERGENCY MEDICINE
Payer: MEDICARE

## 2024-07-14 DIAGNOSIS — N18.6 ESRD ON HEMODIALYSIS (HCC): Primary | ICD-10-CM

## 2024-07-14 DIAGNOSIS — Z99.2 ENCOUNTER FOR DIALYSIS (HCC): ICD-10-CM

## 2024-07-14 DIAGNOSIS — E87.5 HYPERKALEMIA: ICD-10-CM

## 2024-07-14 DIAGNOSIS — Z99.2 ESRD ON HEMODIALYSIS (HCC): Primary | ICD-10-CM

## 2024-07-14 DIAGNOSIS — E87.70 HYPERVOLEMIA, UNSPECIFIED HYPERVOLEMIA TYPE: ICD-10-CM

## 2024-07-14 LAB
ALBUMIN SERPL-MCNC: 3.7 G/DL (ref 3.5–5)
ALBUMIN/GLOB SERPL: 1 (ref 1.1–2.2)
ALP SERPL-CCNC: 166 U/L (ref 45–117)
ALT SERPL-CCNC: 10 U/L (ref 12–78)
ANION GAP SERPL CALC-SCNC: 12 MMOL/L (ref 5–15)
AST SERPL-CCNC: 21 U/L (ref 15–37)
BASOPHILS # BLD: 0.1 K/UL (ref 0–0.1)
BASOPHILS NFR BLD: 1 % (ref 0–1)
BILIRUB SERPL-MCNC: 1.1 MG/DL (ref 0.2–1)
BUN SERPL-MCNC: 66 MG/DL (ref 6–20)
BUN/CREAT SERPL: 7 (ref 12–20)
CALCIUM SERPL-MCNC: 8.6 MG/DL (ref 8.5–10.1)
CHLORIDE SERPL-SCNC: 101 MMOL/L (ref 97–108)
CO2 SERPL-SCNC: 22 MMOL/L (ref 21–32)
COMMENT:: NORMAL
CREAT SERPL-MCNC: 9.16 MG/DL (ref 0.55–1.02)
DIFFERENTIAL METHOD BLD: ABNORMAL
EOSINOPHIL # BLD: 0.2 K/UL (ref 0–0.4)
EOSINOPHIL NFR BLD: 3 % (ref 0–7)
ERYTHROCYTE [DISTWIDTH] IN BLOOD BY AUTOMATED COUNT: 16.1 % (ref 11.5–14.5)
GLOBULIN SER CALC-MCNC: 3.8 G/DL (ref 2–4)
GLUCOSE BLD STRIP.AUTO-MCNC: 173 MG/DL (ref 65–117)
GLUCOSE BLD STRIP.AUTO-MCNC: 201 MG/DL (ref 65–117)
GLUCOSE SERPL-MCNC: 191 MG/DL (ref 65–100)
HCT VFR BLD AUTO: 46.6 % (ref 35–47)
HGB BLD-MCNC: 14.3 G/DL (ref 11.5–16)
IMM GRANULOCYTES # BLD AUTO: 0 K/UL (ref 0–0.04)
IMM GRANULOCYTES NFR BLD AUTO: 0 % (ref 0–0.5)
LIPASE SERPL-CCNC: 17 U/L (ref 13–75)
LYMPHOCYTES # BLD: 0.9 K/UL (ref 0.8–3.5)
LYMPHOCYTES NFR BLD: 14 % (ref 12–49)
MCH RBC QN AUTO: 31 PG (ref 26–34)
MCHC RBC AUTO-ENTMCNC: 30.7 G/DL (ref 30–36.5)
MCV RBC AUTO: 100.9 FL (ref 80–99)
MONOCYTES # BLD: 0.6 K/UL (ref 0–1)
MONOCYTES NFR BLD: 8 % (ref 5–13)
NEUTS SEG # BLD: 5.1 K/UL (ref 1.8–8)
NEUTS SEG NFR BLD: 74 % (ref 32–75)
NRBC # BLD: 0 K/UL (ref 0–0.01)
NRBC BLD-RTO: 0 PER 100 WBC
NT PRO BNP: ABNORMAL PG/ML
PLATELET # BLD AUTO: 123 K/UL (ref 150–400)
PMV BLD AUTO: 12.4 FL (ref 8.9–12.9)
POTASSIUM SERPL-SCNC: 5.6 MMOL/L (ref 3.5–5.1)
PROT SERPL-MCNC: 7.5 G/DL (ref 6.4–8.2)
RBC # BLD AUTO: 4.62 M/UL (ref 3.8–5.2)
SERVICE CMNT-IMP: ABNORMAL
SERVICE CMNT-IMP: ABNORMAL
SODIUM SERPL-SCNC: 135 MMOL/L (ref 136–145)
SPECIMEN HOLD: NORMAL
WBC # BLD AUTO: 6.9 K/UL (ref 3.6–11)

## 2024-07-14 PROCEDURE — 6370000000 HC RX 637 (ALT 250 FOR IP): Performed by: EMERGENCY MEDICINE

## 2024-07-14 PROCEDURE — 71045 X-RAY EXAM CHEST 1 VIEW: CPT

## 2024-07-14 PROCEDURE — 2580000003 HC RX 258: Performed by: EMERGENCY MEDICINE

## 2024-07-14 PROCEDURE — 85025 COMPLETE CBC W/AUTO DIFF WBC: CPT

## 2024-07-14 PROCEDURE — 80053 COMPREHEN METABOLIC PANEL: CPT

## 2024-07-14 PROCEDURE — 82962 GLUCOSE BLOOD TEST: CPT

## 2024-07-14 PROCEDURE — 83880 ASSAY OF NATRIURETIC PEPTIDE: CPT

## 2024-07-14 PROCEDURE — 96361 HYDRATE IV INFUSION ADD-ON: CPT

## 2024-07-14 PROCEDURE — 99285 EMERGENCY DEPT VISIT HI MDM: CPT

## 2024-07-14 PROCEDURE — 36415 COLL VENOUS BLD VENIPUNCTURE: CPT

## 2024-07-14 PROCEDURE — 93005 ELECTROCARDIOGRAM TRACING: CPT | Performed by: EMERGENCY MEDICINE

## 2024-07-14 PROCEDURE — 83690 ASSAY OF LIPASE: CPT

## 2024-07-14 PROCEDURE — 96375 TX/PRO/DX INJ NEW DRUG ADDON: CPT

## 2024-07-14 PROCEDURE — 96374 THER/PROPH/DIAG INJ IV PUSH: CPT

## 2024-07-14 PROCEDURE — 6360000002 HC RX W HCPCS: Performed by: EMERGENCY MEDICINE

## 2024-07-14 RX ORDER — MIRTAZAPINE 15 MG/1
15 TABLET, FILM COATED ORAL NIGHTLY
Status: DISCONTINUED | OUTPATIENT
Start: 2024-07-14 | End: 2024-07-16 | Stop reason: HOSPADM

## 2024-07-14 RX ORDER — HYDRALAZINE HYDROCHLORIDE 25 MG/1
25 TABLET, FILM COATED ORAL EVERY 8 HOURS SCHEDULED
Status: DISCONTINUED | OUTPATIENT
Start: 2024-07-14 | End: 2024-07-16 | Stop reason: HOSPADM

## 2024-07-14 RX ORDER — AMLODIPINE BESYLATE 5 MG/1
10 TABLET ORAL DAILY
Status: DISCONTINUED | OUTPATIENT
Start: 2024-07-14 | End: 2024-07-16 | Stop reason: HOSPADM

## 2024-07-14 RX ORDER — ONDANSETRON 2 MG/ML
4 INJECTION INTRAMUSCULAR; INTRAVENOUS EVERY 6 HOURS PRN
Status: DISCONTINUED | OUTPATIENT
Start: 2024-07-14 | End: 2024-07-16 | Stop reason: HOSPADM

## 2024-07-14 RX ORDER — DEXTROSE MONOHYDRATE 100 MG/ML
INJECTION, SOLUTION INTRAVENOUS CONTINUOUS PRN
Status: DISCONTINUED | OUTPATIENT
Start: 2024-07-14 | End: 2024-07-16 | Stop reason: HOSPADM

## 2024-07-14 RX ORDER — CARVEDILOL 12.5 MG/1
25 TABLET ORAL 2 TIMES DAILY WITH MEALS
Status: DISCONTINUED | OUTPATIENT
Start: 2024-07-14 | End: 2024-07-16 | Stop reason: HOSPADM

## 2024-07-14 RX ADMIN — DEXTROSE MONOHYDRATE 250 ML: 100 INJECTION, SOLUTION INTRAVENOUS at 20:48

## 2024-07-14 RX ADMIN — INSULIN HUMAN 10 UNITS: 100 INJECTION, SOLUTION PARENTERAL at 20:48

## 2024-07-14 RX ADMIN — AMLODIPINE BESYLATE 10 MG: 5 TABLET ORAL at 20:46

## 2024-07-14 RX ADMIN — ONDANSETRON HYDROCHLORIDE 4 MG: 2 INJECTION, SOLUTION INTRAMUSCULAR; INTRAVENOUS at 20:59

## 2024-07-14 RX ADMIN — MIRTAZAPINE 15 MG: 15 TABLET, FILM COATED ORAL at 20:45

## 2024-07-14 RX ADMIN — HYDRALAZINE HYDROCHLORIDE 25 MG: 25 TABLET ORAL at 22:20

## 2024-07-14 RX ADMIN — SACUBITRIL AND VALSARTAN 1 TABLET: 49; 51 TABLET, FILM COATED ORAL at 20:40

## 2024-07-14 ASSESSMENT — PAIN SCALES - GENERAL: PAINLEVEL_OUTOF10: 0

## 2024-07-14 NOTE — ED PROVIDER NOTES
[facility] EMERGENCY DEP  EMERGENCY DEPARTMENT ENCOUNTER      Pt Name: Sherly Chakraborty  MRN: [id number]  Birthdate 1955  Date of evaluation: 7/14/2024  Provider: Jesse Mccarthy MD    CHIEF COMPLAINT       Chief Complaint   Patient presents with    Leg Swelling         HISTORY OF PRESENT ILLNESS   (Location/Symptom, Timing/Onset, Context/Setting, Quality, Duration, Modifying Factors, Severity)  Note limiting factors.   69-year-old female presents from home accompanied by daughter with concerns for shortness of breath.  Patient is on dialysis.  Did not go to dialysis yesterday because of the rain.  Last night the daughter was helping her get a shower she noticed the patient was very short of breath and that she had edema in her lower extremities.  Patient felt no better today and daughter thought that she could not make it again till Tuesday.  Her normal dialysis days are Tuesday Thursday Saturday.  She has had a nonproductive cough but denies any vomiting or fever.    Dialysis at [facility] [address] [facility]    The history is provided by the patient, a relative and medical records.         Review of External Medical Records:     Nursing Notes were reviewed.    REVIEW OF SYSTEMS    (2-9 systems for level 4, 10 or more for level 5)     Review of Systems   Constitutional:  Negative for fatigue.   HENT:  Negative for sore throat.    Eyes:  Negative for visual disturbance.   Respiratory:  Negative for cough.    Cardiovascular:  Negative for palpitations.   Gastrointestinal:  Negative for vomiting.   Genitourinary:  Negative for difficulty urinating.   Musculoskeletal:  Negative for myalgias.   Skin:  Negative for rash.   Neurological:  Negative for weakness.       Except as noted above the remainder of the review of systems was reviewed and negative.       PAST MEDICAL HISTORY     Past Medical History:   Diagnosis Date    Blind     CKD (chronic kidney disease)     HTN (hypertension)     Retinopathy     Type II or unspecified

## 2024-07-14 NOTE — ED TRIAGE NOTES
Pt arrives with daughter via wheelchair, pt difficulty staying awake in triage. C/o SOB and bilateral leg swelling. Per daughter, pt missed dialysis yesterday. Currently Tu/Th/Sat.

## 2024-07-15 LAB
EKG ATRIAL RATE: 100 BPM
EKG DIAGNOSIS: NORMAL
EKG P AXIS: 12 DEGREES
EKG P-R INTERVAL: 192 MS
EKG Q-T INTERVAL: 364 MS
EKG QRS DURATION: 86 MS
EKG QTC CALCULATION (BAZETT): 469 MS
EKG R AXIS: 70 DEGREES
EKG T AXIS: 0 DEGREES
EKG VENTRICULAR RATE: 100 BPM

## 2024-07-15 PROCEDURE — 6370000000 HC RX 637 (ALT 250 FOR IP): Performed by: EMERGENCY MEDICINE

## 2024-07-15 RX ADMIN — AMLODIPINE BESYLATE 10 MG: 5 TABLET ORAL at 08:41

## 2024-07-15 RX ADMIN — HYDRALAZINE HYDROCHLORIDE 25 MG: 25 TABLET ORAL at 04:36

## 2024-07-15 RX ADMIN — SACUBITRIL AND VALSARTAN 1 TABLET: 49; 51 TABLET, FILM COATED ORAL at 08:41

## 2024-07-15 RX ADMIN — CARVEDILOL 25 MG: 12.5 TABLET, FILM COATED ORAL at 08:41

## 2024-07-15 ASSESSMENT — PAIN SCALES - GENERAL
PAINLEVEL_OUTOF10: 0
PAINLEVEL_OUTOF10: 0

## 2024-07-15 NOTE — ED PROVIDER NOTES
Patient was signed over to me to change of shift this morning by Dr. Robledo pending hemodialysis.  Nephrology was contacted during the night but they were not able to dialyze the patient as there was no nursing staff available to do so.  It was noted the patient was can be dialyzed this morning.  At this time, it is unclear as to exactly when that dialysis will occur.  I have asked the nurse in charge of the patient to clarify with nephrology or the dialysis nurse as to when this dialysis will clearly be done and to verify that it will be done.  Patient is currently stable.     Gui Kruger MD  07/15/24 [id number]

## 2024-07-15 NOTE — ED NOTES
Bedside and Verbal shift change report given to ANDREW (oncoming nurse) by YUE (offgoing nurse). Report included the following information Nurse Handoff Report, Index, ED Encounter Summary, ED SBAR, MAR, and Recent Results.

## 2024-07-15 NOTE — PROGRESS NOTES
[address], [facility] [place] [address]  Phone: [phone]   [phone]    [email or web address]     Nephrology Progress Note    Patient Name : Sherly Chakraborty      : 1955     MRN : [id number]  Date of Admission : 2024  Date of Servive : 07/15/24    CC:  Follow up for ESRD       Assessment and Plan   ESRD on HD   - Dialyzes TTS at [facility]   - access: [facility] AVF  - HD today and again tomorrow     Hyperkalemia   - HD will help     AOCD  - Hb stable, not on GERMAINE     CKD-MBD  - continue Phos binders      HTN  HFrEF   CAD/CABG  DM2     Interval History:  Pt seen and examined. Reports feeling tired. Missed HD x1   SOB +, No chest pain/ fevers     Review of Systems: A comprehensive review of systems was negative.    Current Medications:   Current Facility-Administered Medications   Medication Dose Route Frequency    ondansetron (ZOFRAN) injection 4 mg  4 mg IntraVENous Q6H PRN    glucose chewable tablet 16 g  4 tablet Oral PRN    dextrose bolus 10% 125 mL  125 mL IntraVENous PRN    Or    dextrose bolus 10% 250 mL  250 mL IntraVENous PRN    glucagon injection 1 mg  1 mg SubCUTAneous PRN    dextrose 10 % infusion   IntraVENous Continuous PRN    mirtazapine (REMERON) tablet 15 mg  15 mg Oral Nightly    hydrALAZINE (APRESOLINE) tablet 25 mg  25 mg Oral 3 times per day    carvedilol (COREG) tablet 25 mg  25 mg Oral BID WC    amLODIPine (NORVASC) tablet 10 mg  10 mg Oral Daily    sacubitril-valsartan (ENTRESTO) 49-51 MG per tablet 1 tablet  1 tablet Oral BID     Current Outpatient Medications   Medication Sig    mirtazapine (REMERON) 15 MG tablet Take 1 tablet by mouth nightly    insulin glargine (LANTUS SOLOSTAR) 100 UNIT/ML injection pen Inject 8 units daily.    acetaminophen (TYLENOL) 500 MG tablet Take by mouth every 6 hours as needed    amLODIPine (NORVASC) 10 MG tablet Take 10 mg by mouth daily    carvedilol (COREG) 25 MG tablet Take 25 mg by

## 2024-07-15 NOTE — ED NOTES
ED SIGN OUT NOTE  Care assumed at [facility] 9:10 PM EDT    Patient was signed out to me by Dr. Mccarthy.     Patient is awaiting dialysis and final disposition.    BP (!) 147/82   Pulse (!) 109   Temp 97.1 °F (36.2 °C) (Temporal)   Resp 22   Ht 1.676 m (5' 6\")   Wt 74.6 kg (164 lb 7.4 oz)   SpO2 95%   BMI 26.55 kg/m²     Labs Reviewed   CBC WITH AUTO DIFFERENTIAL - Abnormal; Notable for the following components:       Result Value    .9 (*)     RDW 16.1 (*)     Platelets 123 (*)     All other components within normal limits   COMPREHENSIVE METABOLIC PANEL - Abnormal; Notable for the following components:    Sodium 135 (*)     Potassium 5.6 (*)     Glucose 191 (*)     BUN 66 (*)     Creatinine 9.16 (*)     BUN/Creatinine Ratio 7 (*)     Est, Glom Filt Rate 4 (*)     Total Bilirubin 1.1 (*)     ALT 10 (*)     Alk Phosphatase 166 (*)     Albumin/Globulin Ratio 1.0 (*)     All other components within normal limits   BRAIN NATRIURETIC PEPTIDE - Abnormal; Notable for the following components:    NT Pro-BNP 18,654 (*)     All other components within normal limits   POCT GLUCOSE - Abnormal; Notable for the following components:    POC Glucose 173 (*)     All other components within normal limits   LIPASE   EXTRA TUBES HOLD   URINALYSIS WITH MICROSCOPIC   POCT GLUCOSE   POCT GLUCOSE   POCT GLUCOSE   POCT GLUCOSE   POCT GLUCOSE   POCT GLUCOSE     XR CHEST PORTABLE   Final Result   Lung hyperinflation   Mild interstitial edema   New left lung large pleural effusion versus subsegmental atelectasis versus   infiltrate         Electronically signed by Suzette Diaz          ED Course as of 07/14/24 2206   Sun Jul 14, 2024   1606 EKG 12 Lead (SOB)  ED ECG interpretation:  Rhythm: normal sinus rhythm. Rate (approx.): 100.  Axis: normal.  ST segment:  No concerning ST elevations or depressions. This EKG was independently interpreted by Jesse Mccarthy MD,ED Provider.   [JM]   1826 I spoke with Dr. Chavez from

## 2024-07-15 NOTE — PROGRESS NOTES
[facility]         NAME:Sherly Chakraborty  MRN:[id number]   :1955   CC SOB    Patient is a 68 y/o female with ESRD on HD TTS @ [facility] who came in to the ER for SOB. Patient missed her Saturday session due to the heavy rain.     ESRD on HD TTS @ [facility]  - access [facility] AVF  - last HD     Hyperkalemia  - K of 5.6    PLAN  HD ordered for 7/15  E[facility] notified  No need for GERMAINE      No admission diagnoses are documented for this encounter.     Laurent Alex APRN - NP  [facility]

## 2024-07-15 NOTE — ED NOTES
Verbal shift change report given to MARYLOU Mary (oncoming nurse) by MARYLOU Spear (offgoing nurse). Report included the following information Nurse Handoff Report, Index, ED Encounter Summary, ED SBAR, Adult Overview, MAR, Recent Results, Neuro Assessment, and Event Log.

## 2024-07-15 NOTE — ED NOTES
O2 @2L/min via NC applied to pt for O2 sats in the upper 80s after changing O2 sensor. Pt has a weak cough and is not producing sputum at this time. MD notified of status changes. O2 is 97% at this time.

## 2024-07-15 NOTE — ED PROVIDER NOTES
Patient was signed over to me to change of shift this morning by Dr. Robledo pending hemodialysis.  Nephrology was contacted during the night but they were not able to dialyze the patient as there was no nursing staff available to do so.  It was noted the patient was can be dialyzed this morning.  At this time, it is unclear as to exactly when that dialysis will occur.  I have asked the nurse in charge of the patient to clarify with nephrology or the dialysis nurse as to when this dialysis will clearly be done and to verify that it will be done.  Patient is currently stable.     Gui Kruger MD  07/15/24 0138  According to the record, Dr. Voss had seen this patient at 8:00 this morning and was planning on setting up dialysis again today.  Nursing is attempting to work through this and determine when that will occur.

## 2024-07-15 NOTE — ED NOTES
Bedside and Verbal shift change report given to MARYLOU Flores (oncoming nurse) by MARYLOU Perry (offgoing nurse). Report included the following information Nurse Handoff Report, ED Encounter Summary, ED SBAR, and Event Log.

## 2024-07-15 NOTE — ED NOTES
Pt leaning to R side, crooked in bed with breakfast tray falling over. Pt states she had an accident and went to the bathroom. Perineal care provided, but no BM present. Pt repositioned in bed and provided with juice to drink. One sip taken before she was done. Notified her I will be back to pass around lunch tray shortly. She verbalizes understanding and expresses no other needs at this time.

## 2024-07-15 NOTE — ED NOTES
Update received from Charge Nurse. She states pt is scheduled to have dialysis done at midnight tonight.

## 2024-07-15 NOTE — ED NOTES
Bedside and Verbal shift change report given to Viet Wu (oncoming nurse) by Fermin Lara (offgoing nurse). Report included the following information Nurse Handoff Report, ED Encounter Summary, ED SBAR, MAR, and Recent Results.        Merlin Okeefe RN  08/12/23 7388 Spoke with Charge Nurse, she has called [facility] twice today with no answer, so she left a VM each time. She also tried contact the Dialysis Suite upstairs, but the number provided did not go through. Will continue to call and get updates.

## 2024-07-16 VITALS
HEIGHT: 66 IN | WEIGHT: 158.51 LBS | OXYGEN SATURATION: 95 % | SYSTOLIC BLOOD PRESSURE: 167 MMHG | DIASTOLIC BLOOD PRESSURE: 77 MMHG | RESPIRATION RATE: 18 BRPM | TEMPERATURE: 97.6 F | HEART RATE: 85 BPM | BODY MASS INDEX: 25.47 KG/M2

## 2024-07-16 LAB
ANION GAP SERPL CALC-SCNC: 14 MMOL/L (ref 5–15)
BUN SERPL-MCNC: 72 MG/DL (ref 6–20)
BUN/CREAT SERPL: 7 (ref 12–20)
CALCIUM SERPL-MCNC: 8.5 MG/DL (ref 8.5–10.1)
CHLORIDE SERPL-SCNC: 101 MMOL/L (ref 97–108)
CO2 SERPL-SCNC: 22 MMOL/L (ref 21–32)
CREAT SERPL-MCNC: 10.4 MG/DL (ref 0.55–1.02)
GLUCOSE SERPL-MCNC: 158 MG/DL (ref 65–100)
HBV SURFACE AB SER QL: REACTIVE
HBV SURFACE AB SER-ACNC: 93.8 MIU/ML
HBV SURFACE AG SER QL: <0.1 INDEX
HBV SURFACE AG SER QL: NEGATIVE
POTASSIUM SERPL-SCNC: 5.5 MMOL/L (ref 3.5–5.1)
SODIUM SERPL-SCNC: 137 MMOL/L (ref 136–145)

## 2024-07-16 PROCEDURE — 36415 COLL VENOUS BLD VENIPUNCTURE: CPT

## 2024-07-16 PROCEDURE — 87340 HEPATITIS B SURFACE AG IA: CPT

## 2024-07-16 PROCEDURE — 6370000000 HC RX 637 (ALT 250 FOR IP): Performed by: EMERGENCY MEDICINE

## 2024-07-16 PROCEDURE — 86706 HEP B SURFACE ANTIBODY: CPT

## 2024-07-16 PROCEDURE — 90935 HEMODIALYSIS ONE EVALUATION: CPT

## 2024-07-16 PROCEDURE — 80048 BASIC METABOLIC PNL TOTAL CA: CPT

## 2024-07-16 RX ADMIN — MIRTAZAPINE 15 MG: 15 TABLET, FILM COATED ORAL at 03:05

## 2024-07-16 RX ADMIN — SACUBITRIL AND VALSARTAN 1 TABLET: 49; 51 TABLET, FILM COATED ORAL at 12:04

## 2024-07-16 RX ADMIN — CARVEDILOL 25 MG: 12.5 TABLET, FILM COATED ORAL at 12:03

## 2024-07-16 NOTE — ED NOTES
Spoke to daughter and gave update on pt's status and plan of care. Informed daughter, this RN was told dialysis was scheduled for midnight. Daughter verbalized understanding.

## 2024-07-16 NOTE — ED NOTES
Pt oxygen saturation read low when o2 removed, after re-evaluation sensor found to be incorrect, oxygen saturation 100% via portable pulse ox after oxygen  via nc removed. Pt to discharge home

## 2024-07-16 NOTE — FLOWSHEET NOTE
Pre Dialysis:   07/16/24 0805   Treatment   Time On 0805   Time Off 1135   Treatment Goal 2.5L   Observations & Evaluations   Level of Consciousness 0   Heart Rhythm Regular   Respiratory Quality/Effort Unlabored   O2 Device Nasal cannula   Skin Condition/Temp Dry;Warm   Abdomen Inspection Soft   Edema Generalized   Vital Signs   /72   Temp 97.8 °F (36.6 °C)   Pulse 80   Respirations 18   SpO2 95 %   Pain Assessment   Pain Assessment None - Denies Pain   Access   Add Access? Yes   Technical Checks   Dialysis Machine No. 03   RO Machine Number [id number]   Dialyzer Lot No. [id number]   Tubing Lot Number [id number]   All Connections Secure Yes   NS Bag Yes   Saline Line Double Clamped Yes   Dialyzer Revaclear 300   Prime Volume (mL) 250 mL   ICEBOAT I;C;E;B;O;A;T   RO Machine Log Sheet Completed Yes   Machine Alarm Self Test Completed;Passed   Air Foam Detector Tested;Proper Function   Extracorporeal Circuit Tested for Integrity Yes   Machine Conductivity 13.6   Manual Ph 7.4   Bleach Test (Neg) Yes   Bath Temperature 96.8 °F (36 °C)   Treatment Initiation   Dialyze Hours 3.5   Treatment  Initiation Universal Precautions maintained;Lines secured to patient;Connections secured;Prime given;Venous Parameters set;Arterial Parameters set;Air foam detector engaged;Saline line double clamped;Revaclear Dialyzer   During Hemodialysis Assessment   Blood Flow Rate (ml/min) 400 ml/min   Arterial Pressure (mmHg) -130 mmHg   Venous Pressure (mmHg) 190   TMP 50      Access Visible Yes   Ultrafiltration Rate (ml/hr) 860 ml/hr   Ultrafiltration Removed (ml) 0 ml   Hemodialysis Fistula/Graft Arteriovenous fistula Right Arm   Placement Date/Time: 03/15/24 1000   Present on Admission/Arrival: Yes  Access Type: Arteriovenous fistula  Orientation: Right  Access Location: Arm   Site Assessment Clean, dry & intact   Thrill Present   Bruit Present   Status Accessed   Venous Needle Size 15 G   Arterial Needle Size 15 G   Accessed By: AARON

## 2024-07-16 NOTE — ED PROVIDER NOTES
ED SIGN OUT NOTE  Care assumed at [facility] 6:53 AM EDT    Patient was signed out to me by Dr. Toribio.     Patient is awaiting dialysis today--> once complete- plan for discharge.    12:40 PM  Patient has completed her dialysis session at this time. No acute complaints, eating and drinking. Plan to dc in stable condition per previous physician's plan.     BP (!) 167/77   Pulse 85   Temp 97.8 °F (36.6 °C)   Resp 18   Ht 1.676 m (5' 6\")   Wt 71.9 kg (158 lb 8.2 oz)   SpO2 95%   BMI 25.58 kg/m²     Labs Reviewed   CBC WITH AUTO DIFFERENTIAL - Abnormal; Notable for the following components:       Result Value    .9 (*)     RDW 16.1 (*)     Platelets 123 (*)     All other components within normal limits   COMPREHENSIVE METABOLIC PANEL - Abnormal; Notable for the following components:    Sodium 135 (*)     Potassium 5.6 (*)     Glucose 191 (*)     BUN 66 (*)     Creatinine 9.16 (*)     BUN/Creatinine Ratio 7 (*)     Est, Glom Filt Rate 4 (*)     Total Bilirubin 1.1 (*)     ALT 10 (*)     Alk Phosphatase 166 (*)     Albumin/Globulin Ratio 1.0 (*)     All other components within normal limits   BRAIN NATRIURETIC PEPTIDE - Abnormal; Notable for the following components:    NT Pro-BNP 18,654 (*)     All other components within normal limits   BASIC METABOLIC PANEL - Abnormal; Notable for the following components:    Potassium 5.5 (*)     Glucose 158 (*)     BUN 72 (*)     Creatinine 10.40 (*)     BUN/Creatinine Ratio 7 (*)     Est, Glom Filt Rate 4 (*)     All other components within normal limits   HEPATITIS B SURFACE ANTIBODY - Abnormal; Notable for the following components:    Hep B S Ab Interp REACTIVE (*)     All other components within normal limits   POCT GLUCOSE - Abnormal; Notable for the following components:    POC Glucose 173 (*)     All other components within normal limits   POCT GLUCOSE - Abnormal; Notable for the following components:    POC Glucose 201 (*)     All other components

## 2024-11-29 NOTE — DISCHARGE INSTRUCTIONS
Discharge SNF/Rehab Instructions/LTAC       PATIENT ID: Sherly Chakraborty  MRN: [id number]   YOB: 1955    DATE OF ADMISSION: 3/14/2024 12:31 PM  DATE OF DISCHARGE: 3/19/2024    PRIMARY CARE PROVIDER:  Derick Paula MD      ATTENDING PHYSICIAN: Rodolfo Gramajo MD  DISCHARGING PROVIDER: Rodolfo Gramajo MD     To contact this individual call [phone] and ask the  to page.   If unavailable ask to be transferred the Adult Hospitalist Department.    CONSULTATIONS:   IP CONSULT TO NEPHROLOGY  IP CONSULT TO ORTHOPEDIC SURGERY  IP CONSULT TO PALLIATIVE CARE  IP CONSULT TO CASE MANAGEMENT    PROCEDURES/SURGERIES: * No surgery found *    ADMITTING DIAGNOSES & HOSPITAL COURSE:      Sherly Chakraborty is a 69 y.o. female with PMH significant for ESRD on HD on MWF, legally blind both eyes, chronic HFrEF last EF 20-25% in 2022, ischemic cardiomyopathy s/p ICD, CAD s/p CABG, HTN, T2DM, anemia of chronic disease and poor historian who is brought from home after she was unable to get up and ambulate following a fall earlier this morning at 1:30 am.  She stated that she fell after using bedside, but no head injury or loss of consciousness.  No seizure activities, left-sided chest pain, palpitation or dizziness.  She stated that she has right groin area pain more when she moves her right legs legs.  Per her daughter, she stated that patient ambulated few steps with a cane and uses most of the time with care for the last  one month.     The patient denies any headache, sore throat, trouble swallowing, trouble with speech, chest pain, SOB, cough, fever, chills, N/V/D, abd pain, urinary symptoms, sick contacts with COVID-19 diagnosed patients, hematemesis, melena or hemoptysis.     Vital signs on arrival to ER; temperature 97.6, respirate 15, pulse 99, /82, saturation of oxygen 97% on room air.  CT pelvis showed acute comminuted nondisplaced right inferior pubic ramus fracture.  Chest x-ray  Patient's last appointment was : 11/27/2024  Patient's next appointment is : 3/4/2025  Last refilled: 3/19/24

## 2025-04-26 ENCOUNTER — HOSPITAL ENCOUNTER (OUTPATIENT)
Facility: HOSPITAL | Age: 70
Setting detail: SPECIMEN
Discharge: HOME | End: 2025-04-29

## 2025-04-26 PROCEDURE — 84520 ASSAY OF UREA NITROGEN: CPT
